# Patient Record
Sex: MALE | Race: BLACK OR AFRICAN AMERICAN | NOT HISPANIC OR LATINO | Employment: UNEMPLOYED | URBAN - NONMETROPOLITAN AREA
[De-identification: names, ages, dates, MRNs, and addresses within clinical notes are randomized per-mention and may not be internally consistent; named-entity substitution may affect disease eponyms.]

---

## 2021-01-23 ENCOUNTER — HOSPITAL ENCOUNTER (EMERGENCY)
Facility: HOSPITAL | Age: 56
Discharge: HOME | End: 2021-01-23
Attending: EMERGENCY MEDICINE
Payer: MEDICAID

## 2021-01-23 VITALS
WEIGHT: 248 LBS | HEIGHT: 70 IN | TEMPERATURE: 98 F | SYSTOLIC BLOOD PRESSURE: 137 MMHG | BODY MASS INDEX: 35.5 KG/M2 | OXYGEN SATURATION: 99 % | DIASTOLIC BLOOD PRESSURE: 98 MMHG | HEART RATE: 100 BPM | RESPIRATION RATE: 20 BRPM

## 2021-01-23 DIAGNOSIS — R05.9 COUGH: Primary | ICD-10-CM

## 2021-01-23 DIAGNOSIS — J30.9 ALLERGIC RHINITIS WITH POSTNASAL DRIP: ICD-10-CM

## 2021-01-23 DIAGNOSIS — R09.82 ALLERGIC RHINITIS WITH POSTNASAL DRIP: ICD-10-CM

## 2021-01-23 PROCEDURE — 96372 THER/PROPH/DIAG INJ SC/IM: CPT

## 2021-01-23 PROCEDURE — 99284 EMERGENCY DEPT VISIT MOD MDM: CPT | Mod: 25

## 2021-01-23 PROCEDURE — 63600175 PHARM REV CODE 636 W HCPCS: Performed by: EMERGENCY MEDICINE

## 2021-01-23 RX ORDER — BETAMETHASONE SODIUM PHOSPHATE AND BETAMETHASONE ACETATE 3; 3 MG/ML; MG/ML
6 INJECTION, SUSPENSION INTRA-ARTICULAR; INTRALESIONAL; INTRAMUSCULAR; SOFT TISSUE
Status: COMPLETED | OUTPATIENT
Start: 2021-01-23 | End: 2021-01-23

## 2021-01-23 RX ORDER — GABAPENTIN 300 MG/1
CAPSULE ORAL
COMMUNITY
Start: 2020-10-30 | End: 2023-02-27

## 2021-01-23 RX ORDER — INSULIN DETEMIR 100 [IU]/ML
INJECTION, SOLUTION SUBCUTANEOUS
COMMUNITY
Start: 2020-10-26 | End: 2023-02-27

## 2021-01-23 RX ORDER — PANTOPRAZOLE SODIUM 40 MG/1
40 TABLET, DELAYED RELEASE ORAL DAILY
COMMUNITY
Start: 2020-11-05 | End: 2023-02-27

## 2021-01-23 RX ORDER — METFORMIN HYDROCHLORIDE 500 MG/1
500 TABLET ORAL 2 TIMES DAILY
COMMUNITY
Start: 2020-11-02 | End: 2023-02-27

## 2021-01-23 RX ORDER — BENZONATATE 100 MG/1
100 CAPSULE ORAL 3 TIMES DAILY PRN
Qty: 20 CAPSULE | Refills: 0 | Status: SHIPPED | OUTPATIENT
Start: 2021-01-23 | End: 2021-02-02

## 2021-01-23 RX ORDER — AMITRIPTYLINE HYDROCHLORIDE 75 MG/1
TABLET ORAL
COMMUNITY
Start: 2020-10-26 | End: 2023-02-27

## 2021-01-23 RX ADMIN — BETAMETHASONE SODIUM PHOSPHATE AND BETAMETHASONE ACETATE 6 MG: 3; 3 INJECTION, SUSPENSION INTRA-ARTICULAR; INTRALESIONAL; INTRAMUSCULAR; SOFT TISSUE at 11:01

## 2021-02-11 ENCOUNTER — HOSPITAL ENCOUNTER (OUTPATIENT)
Dept: RADIOLOGY | Facility: HOSPITAL | Age: 56
Discharge: HOME | End: 2021-02-11
Attending: NURSE PRACTITIONER
Payer: MEDICAID

## 2021-02-11 DIAGNOSIS — R05.9 COUGH: ICD-10-CM

## 2021-02-11 DIAGNOSIS — R05.9 COUGH: Primary | ICD-10-CM

## 2021-02-11 PROCEDURE — 71046 X-RAY EXAM CHEST 2 VIEWS: CPT | Mod: TC

## 2021-02-12 DIAGNOSIS — Z01.818 PRE-OP TESTING: ICD-10-CM

## 2021-02-12 DIAGNOSIS — F17.200 TOBACCO USE DISORDER: Primary | ICD-10-CM

## 2021-02-12 DIAGNOSIS — Z11.59 SPECIAL SCREENING EXAMINATION FOR VIRAL DISEASE: Primary | ICD-10-CM

## 2021-02-17 ENCOUNTER — HOSPITAL ENCOUNTER (OUTPATIENT)
Dept: PREADMISSION TESTING | Facility: HOSPITAL | Age: 56
Discharge: HOME | End: 2021-02-17
Attending: NURSE PRACTITIONER
Payer: MEDICAID

## 2021-02-17 DIAGNOSIS — Z01.818 PRE-OP TESTING: ICD-10-CM

## 2021-02-17 LAB — SARS-COV-2 RDRP RESP QL NAA+PROBE: NEGATIVE

## 2021-02-17 PROCEDURE — U0002 COVID-19 LAB TEST NON-CDC: HCPCS

## 2021-02-18 ENCOUNTER — HOSPITAL ENCOUNTER (OUTPATIENT)
Dept: PULMONOLOGY | Facility: HOSPITAL | Age: 56
Discharge: HOME | End: 2021-02-18
Attending: NURSE PRACTITIONER
Payer: MEDICAID

## 2021-02-18 DIAGNOSIS — F17.200 TOBACCO USE DISORDER: ICD-10-CM

## 2021-02-18 PROCEDURE — 94060 EVALUATION OF WHEEZING: CPT

## 2021-02-18 RX ORDER — ALBUTEROL SULFATE 2.5 MG/.5ML
2.5 SOLUTION RESPIRATORY (INHALATION) ONCE
Status: COMPLETED | OUTPATIENT
Start: 2021-02-18 | End: 2021-02-18

## 2021-02-18 RX ADMIN — ALBUTEROL SULFATE 2.5 MG: 2.5 SOLUTION RESPIRATORY (INHALATION) at 10:02

## 2021-03-02 LAB
FEF 25 75 LLN: 1.21
FEF 25 75 PRE REF: 170.3 %
FEF 25 75 REF: 2.76
FET100 CHG: -11.1 %
FEV05 LLN: 1.74
FEV05 REF: 2.87
FEV1 CHG: 1 %
FEV1 FVC LLN: 67
FEV1 FVC PRE REF: 117.7 %
FEV1 FVC REF: 79
FEV1 LLN: 2.32
FEV1 PRE REF: 90.6 %
FEV1 REF: 3.16
FEV1 VOL CHG: 0.03
FVC CHG: 2.2 %
FVC LLN: 3.02
FVC PRE REF: 76.9 %
FVC REF: 4.03
FVC VOL CHG: 0.07
PEF LLN: 5.9
PEF PRE REF: 99.6 %
PEF REF: 8.56
PHYSICIAN COMMENT: ABNORMAL
POST FEF 25 75: 4.15 L/S (ref 1.21–4.32)
POST FET 100: 5.98 SEC
POST FEV1 FVC: 91.43 % (ref 67.31–89.81)
POST FEV1: 2.89 L (ref 2.32–4)
POST FEV5: 2.25 L (ref 1.74–4.01)
POST FVC: 3.16 L (ref 3.02–5.04)
POST PEF: 7.61 L/S (ref 5.9–11.22)
PRE FEF 25 75: 4.71 L/S (ref 1.21–4.32)
PRE FET 100: 6.72 SEC
PRE FEV05 REF: 85.1 %
PRE FEV1 FVC: 92.47 % (ref 67.31–89.81)
PRE FEV1: 2.86 L (ref 2.32–4)
PRE FEV5: 2.44 L (ref 1.74–4.01)
PRE FVC: 3.1 L (ref 3.02–5.04)
PRE PEF: 8.53 L/S (ref 5.9–11.22)

## 2021-05-04 DIAGNOSIS — N63.20 BREAST MASS, LEFT: Primary | ICD-10-CM

## 2021-05-05 ENCOUNTER — HOSPITAL ENCOUNTER (OUTPATIENT)
Dept: RADIOLOGY | Facility: HOSPITAL | Age: 56
Discharge: HOME | End: 2021-05-05
Attending: NURSE PRACTITIONER
Payer: MEDICAID

## 2021-05-05 VITALS — BODY MASS INDEX: 30.8 KG/M2 | HEIGHT: 71 IN | WEIGHT: 220 LBS

## 2021-05-05 DIAGNOSIS — N63.20 BREAST MASS, LEFT: ICD-10-CM

## 2021-05-05 PROCEDURE — 77066 DX MAMMO INCL CAD BI: CPT | Mod: TC

## 2021-05-06 ENCOUNTER — PATIENT MESSAGE (OUTPATIENT)
Dept: RESEARCH | Facility: HOSPITAL | Age: 56
End: 2021-05-06

## 2021-06-26 ENCOUNTER — CLINICAL SUPPORT (OUTPATIENT)
Dept: OTHER | Facility: CLINIC | Age: 56
End: 2021-06-26

## 2021-06-26 DIAGNOSIS — Z00.8 ENCOUNTER FOR OTHER GENERAL EXAMINATION: ICD-10-CM

## 2021-07-06 VITALS — BODY MASS INDEX: 29.89 KG/M2 | HEIGHT: 71 IN

## 2021-07-06 LAB
HDLC SERPL-MCNC: 35 MG/DL
POC CHOLESTEROL, LDL (DOCK): 104 MG/DL
POC CHOLESTEROL, TOTAL: 172 MG/DL
POC GLUCOSE, FASTING: 139 MG/DL (ref 60–110)
TRIGL SERPL-MCNC: 169 MG/DL

## 2021-12-02 ENCOUNTER — HOSPITAL ENCOUNTER (EMERGENCY)
Facility: HOSPITAL | Age: 56
Discharge: HOME | End: 2021-12-02
Attending: FAMILY MEDICINE
Payer: MEDICAID

## 2021-12-02 VITALS
OXYGEN SATURATION: 100 % | DIASTOLIC BLOOD PRESSURE: 85 MMHG | SYSTOLIC BLOOD PRESSURE: 157 MMHG | WEIGHT: 237 LBS | HEART RATE: 97 BPM | BODY MASS INDEX: 33.05 KG/M2 | RESPIRATION RATE: 18 BRPM | TEMPERATURE: 98 F

## 2021-12-02 DIAGNOSIS — R05.9 COUGH: ICD-10-CM

## 2021-12-02 LAB
ALBUMIN SERPL BCP-MCNC: 3.5 G/DL (ref 3.5–5.2)
ALP SERPL-CCNC: 102 U/L (ref 55–135)
ALT SERPL W/O P-5'-P-CCNC: 27 U/L (ref 10–44)
ANION GAP SERPL CALC-SCNC: 6 MMOL/L (ref 8–16)
AST SERPL-CCNC: 15 U/L (ref 10–40)
BASOPHILS # BLD AUTO: 0.03 K/UL (ref 0–0.2)
BASOPHILS NFR BLD: 0.4 % (ref 0–1.9)
BILIRUB SERPL-MCNC: 0.4 MG/DL (ref 0.1–1)
BUN SERPL-MCNC: 12 MG/DL (ref 6–20)
CALCIUM SERPL-MCNC: 8.8 MG/DL (ref 8.7–10.5)
CHLORIDE SERPL-SCNC: 105 MMOL/L (ref 95–110)
CO2 SERPL-SCNC: 30 MMOL/L (ref 23–29)
CREAT SERPL-MCNC: 1.1 MG/DL (ref 0.5–1.4)
CTP QC/QA: YES
DIFFERENTIAL METHOD: ABNORMAL
EOSINOPHIL # BLD AUTO: 0.2 K/UL (ref 0–0.5)
EOSINOPHIL NFR BLD: 2.8 % (ref 0–8)
ERYTHROCYTE [DISTWIDTH] IN BLOOD BY AUTOMATED COUNT: 14.1 % (ref 11.5–14.5)
EST. GFR  (AFRICAN AMERICAN): >60 ML/MIN/1.73 M^2
EST. GFR  (NON AFRICAN AMERICAN): >60 ML/MIN/1.73 M^2
GLUCOSE SERPL-MCNC: 197 MG/DL (ref 70–110)
HCT VFR BLD AUTO: 41.4 % (ref 40–54)
HGB BLD-MCNC: 12.5 G/DL (ref 14–18)
IMM GRANULOCYTES # BLD AUTO: 0.03 K/UL (ref 0–0.04)
IMM GRANULOCYTES NFR BLD AUTO: 0.4 % (ref 0–0.5)
LYMPHOCYTES # BLD AUTO: 1.5 K/UL (ref 1–4.8)
LYMPHOCYTES NFR BLD: 22.6 % (ref 18–48)
MCH RBC QN AUTO: 27.6 PG (ref 27–31)
MCHC RBC AUTO-ENTMCNC: 30.2 G/DL (ref 32–36)
MCV RBC AUTO: 91 FL (ref 82–98)
MONOCYTES # BLD AUTO: 0.6 K/UL (ref 0.3–1)
MONOCYTES NFR BLD: 8.3 % (ref 4–15)
NEUTROPHILS # BLD AUTO: 4.4 K/UL (ref 1.8–7.7)
NEUTROPHILS NFR BLD: 65.5 % (ref 38–73)
NRBC BLD-RTO: 0 /100 WBC
NT-PROBNP SERPL-MCNC: <5 PG/ML (ref 5–900)
PLATELET # BLD AUTO: 174 K/UL (ref 150–450)
PMV BLD AUTO: 10.6 FL (ref 9.2–12.9)
POTASSIUM SERPL-SCNC: 4.1 MMOL/L (ref 3.5–5.1)
PROT SERPL-MCNC: 7.4 G/DL (ref 6–8.4)
RBC # BLD AUTO: 4.53 M/UL (ref 4.6–6.2)
SARS-COV-2 RDRP RESP QL NAA+PROBE: NEGATIVE
SODIUM SERPL-SCNC: 141 MMOL/L (ref 136–145)
TROPONIN I SERPL DL<=0.01 NG/ML-MCNC: <0.02 NG/ML (ref 0–0.03)
WBC # BLD AUTO: 6.72 K/UL (ref 3.9–12.7)

## 2021-12-02 PROCEDURE — 99284 EMERGENCY DEPT VISIT MOD MDM: CPT | Mod: 25

## 2021-12-02 PROCEDURE — U0002 COVID-19 LAB TEST NON-CDC: HCPCS | Performed by: CLINICAL NURSE SPECIALIST

## 2021-12-02 PROCEDURE — 36415 COLL VENOUS BLD VENIPUNCTURE: CPT | Performed by: CLINICAL NURSE SPECIALIST

## 2021-12-02 PROCEDURE — 80053 COMPREHEN METABOLIC PANEL: CPT | Performed by: CLINICAL NURSE SPECIALIST

## 2021-12-02 PROCEDURE — 85025 COMPLETE CBC W/AUTO DIFF WBC: CPT | Performed by: CLINICAL NURSE SPECIALIST

## 2021-12-02 PROCEDURE — 84484 ASSAY OF TROPONIN QUANT: CPT | Performed by: CLINICAL NURSE SPECIALIST

## 2021-12-02 PROCEDURE — 83880 ASSAY OF NATRIURETIC PEPTIDE: CPT | Performed by: CLINICAL NURSE SPECIALIST

## 2021-12-02 RX ORDER — BENZONATATE 100 MG/1
100 CAPSULE ORAL 3 TIMES DAILY PRN
Qty: 20 CAPSULE | Refills: 0 | Status: SHIPPED | OUTPATIENT
Start: 2021-12-02 | End: 2021-12-12

## 2021-12-02 RX ORDER — ALBUTEROL SULFATE 90 UG/1
1-2 AEROSOL, METERED RESPIRATORY (INHALATION) EVERY 6 HOURS PRN
Qty: 18 G | Refills: 0 | Status: SHIPPED | OUTPATIENT
Start: 2021-12-02 | End: 2023-03-28

## 2022-01-06 DIAGNOSIS — F17.200 TOBACCO USE DISORDER: Primary | ICD-10-CM

## 2022-01-13 ENCOUNTER — HOSPITAL ENCOUNTER (OUTPATIENT)
Dept: RADIOLOGY | Facility: HOSPITAL | Age: 57
Discharge: HOME | End: 2022-01-13
Attending: NURSE PRACTITIONER
Payer: MEDICAID

## 2022-01-13 DIAGNOSIS — F17.200 TOBACCO USE DISORDER: ICD-10-CM

## 2022-01-13 PROCEDURE — 71271 CT THORAX LUNG CANCER SCR C-: CPT | Mod: TC

## 2022-02-22 ENCOUNTER — OFFICE VISIT (OUTPATIENT)
Dept: ENDOCRINOLOGY | Facility: CLINIC | Age: 57
End: 2022-02-22
Payer: MEDICAID

## 2022-02-22 DIAGNOSIS — N62 GYNECOMASTIA: Primary | ICD-10-CM

## 2022-02-22 PROCEDURE — 1159F MED LIST DOCD IN RCRD: CPT | Mod: CPTII,,, | Performed by: STUDENT IN AN ORGANIZED HEALTH CARE EDUCATION/TRAINING PROGRAM

## 2022-02-22 PROCEDURE — 1160F PR REVIEW ALL MEDS BY PRESCRIBER/CLIN PHARMACIST DOCUMENTED: ICD-10-PCS | Mod: CPTII,,, | Performed by: STUDENT IN AN ORGANIZED HEALTH CARE EDUCATION/TRAINING PROGRAM

## 2022-02-22 PROCEDURE — 99499 UNLISTED E&M SERVICE: CPT | Mod: S$PBB,,, | Performed by: STUDENT IN AN ORGANIZED HEALTH CARE EDUCATION/TRAINING PROGRAM

## 2022-02-22 PROCEDURE — 99499 NO LOS: ICD-10-PCS | Mod: S$PBB,,, | Performed by: STUDENT IN AN ORGANIZED HEALTH CARE EDUCATION/TRAINING PROGRAM

## 2022-02-22 PROCEDURE — 4010F ACE/ARB THERAPY RXD/TAKEN: CPT | Mod: CPTII,,, | Performed by: STUDENT IN AN ORGANIZED HEALTH CARE EDUCATION/TRAINING PROGRAM

## 2022-02-22 PROCEDURE — 4010F PR ACE/ARB THEARPY RXD/TAKEN: ICD-10-PCS | Mod: CPTII,,, | Performed by: STUDENT IN AN ORGANIZED HEALTH CARE EDUCATION/TRAINING PROGRAM

## 2022-02-22 PROCEDURE — 1159F PR MEDICATION LIST DOCUMENTED IN MEDICAL RECORD: ICD-10-PCS | Mod: CPTII,,, | Performed by: STUDENT IN AN ORGANIZED HEALTH CARE EDUCATION/TRAINING PROGRAM

## 2022-02-22 PROCEDURE — 1160F RVW MEDS BY RX/DR IN RCRD: CPT | Mod: CPTII,,, | Performed by: STUDENT IN AN ORGANIZED HEALTH CARE EDUCATION/TRAINING PROGRAM

## 2022-03-09 DIAGNOSIS — M25.562 ACUTE PAIN OF BOTH KNEES: Primary | ICD-10-CM

## 2022-03-09 DIAGNOSIS — M25.561 ACUTE PAIN OF BOTH KNEES: Primary | ICD-10-CM

## 2022-03-10 ENCOUNTER — OFFICE VISIT (OUTPATIENT)
Dept: SURGERY | Facility: CLINIC | Age: 57
End: 2022-03-10
Payer: MEDICAID

## 2022-03-10 ENCOUNTER — OFFICE VISIT (OUTPATIENT)
Dept: ENDOCRINOLOGY | Facility: CLINIC | Age: 57
End: 2022-03-10
Payer: MEDICAID

## 2022-03-10 VITALS
WEIGHT: 237.44 LBS | DIASTOLIC BLOOD PRESSURE: 70 MMHG | WEIGHT: 237.44 LBS | BODY MASS INDEX: 33.24 KG/M2 | SYSTOLIC BLOOD PRESSURE: 118 MMHG | SYSTOLIC BLOOD PRESSURE: 159 MMHG | HEIGHT: 71 IN | HEART RATE: 67 BPM | DIASTOLIC BLOOD PRESSURE: 87 MMHG | OXYGEN SATURATION: 97 % | HEIGHT: 71 IN | BODY MASS INDEX: 33.24 KG/M2

## 2022-03-10 DIAGNOSIS — Z01.818 PRE-OP TESTING: Primary | ICD-10-CM

## 2022-03-10 DIAGNOSIS — Z79.4 TYPE 2 DIABETES MELLITUS WITHOUT COMPLICATION, WITH LONG-TERM CURRENT USE OF INSULIN: ICD-10-CM

## 2022-03-10 DIAGNOSIS — E11.9 TYPE 2 DIABETES MELLITUS WITHOUT COMPLICATION, WITH LONG-TERM CURRENT USE OF INSULIN: ICD-10-CM

## 2022-03-10 DIAGNOSIS — Z12.11 SCREEN FOR COLON CANCER: ICD-10-CM

## 2022-03-10 DIAGNOSIS — N62 GYNECOMASTIA: Primary | ICD-10-CM

## 2022-03-10 PROCEDURE — 3008F PR BODY MASS INDEX (BMI) DOCUMENTED: ICD-10-PCS | Mod: CPTII,,, | Performed by: STUDENT IN AN ORGANIZED HEALTH CARE EDUCATION/TRAINING PROGRAM

## 2022-03-10 PROCEDURE — 3079F DIAST BP 80-89 MM HG: CPT | Mod: CPTII,,, | Performed by: STUDENT IN AN ORGANIZED HEALTH CARE EDUCATION/TRAINING PROGRAM

## 2022-03-10 PROCEDURE — 3044F HG A1C LEVEL LT 7.0%: CPT | Mod: CPTII,,, | Performed by: STUDENT IN AN ORGANIZED HEALTH CARE EDUCATION/TRAINING PROGRAM

## 2022-03-10 PROCEDURE — 4010F PR ACE/ARB THEARPY RXD/TAKEN: ICD-10-PCS | Mod: CPTII,,, | Performed by: STUDENT IN AN ORGANIZED HEALTH CARE EDUCATION/TRAINING PROGRAM

## 2022-03-10 PROCEDURE — 4010F ACE/ARB THERAPY RXD/TAKEN: CPT | Mod: CPTII,,, | Performed by: STUDENT IN AN ORGANIZED HEALTH CARE EDUCATION/TRAINING PROGRAM

## 2022-03-10 PROCEDURE — 99999 PR PBB SHADOW E&M-EST. PATIENT-LVL III: CPT | Mod: PBBFAC,,, | Performed by: STUDENT IN AN ORGANIZED HEALTH CARE EDUCATION/TRAINING PROGRAM

## 2022-03-10 PROCEDURE — 99999 PR PBB SHADOW E&M-EST. PATIENT-LVL III: ICD-10-PCS | Mod: PBBFAC,,, | Performed by: STUDENT IN AN ORGANIZED HEALTH CARE EDUCATION/TRAINING PROGRAM

## 2022-03-10 PROCEDURE — 3077F SYST BP >= 140 MM HG: CPT | Mod: CPTII,,, | Performed by: STUDENT IN AN ORGANIZED HEALTH CARE EDUCATION/TRAINING PROGRAM

## 2022-03-10 PROCEDURE — 3079F PR MOST RECENT DIASTOLIC BLOOD PRESSURE 80-89 MM HG: ICD-10-PCS | Mod: CPTII,,, | Performed by: STUDENT IN AN ORGANIZED HEALTH CARE EDUCATION/TRAINING PROGRAM

## 2022-03-10 PROCEDURE — 1159F PR MEDICATION LIST DOCUMENTED IN MEDICAL RECORD: ICD-10-PCS | Mod: CPTII,,, | Performed by: STUDENT IN AN ORGANIZED HEALTH CARE EDUCATION/TRAINING PROGRAM

## 2022-03-10 PROCEDURE — 99214 PR OFFICE/OUTPT VISIT, EST, LEVL IV, 30-39 MIN: ICD-10-PCS | Mod: S$PBB,,, | Performed by: STUDENT IN AN ORGANIZED HEALTH CARE EDUCATION/TRAINING PROGRAM

## 2022-03-10 PROCEDURE — 3077F PR MOST RECENT SYSTOLIC BLOOD PRESSURE >= 140 MM HG: ICD-10-PCS | Mod: CPTII,,, | Performed by: STUDENT IN AN ORGANIZED HEALTH CARE EDUCATION/TRAINING PROGRAM

## 2022-03-10 PROCEDURE — 3044F PR MOST RECENT HEMOGLOBIN A1C LEVEL <7.0%: ICD-10-PCS | Mod: CPTII,,, | Performed by: STUDENT IN AN ORGANIZED HEALTH CARE EDUCATION/TRAINING PROGRAM

## 2022-03-10 PROCEDURE — 99999 PR PBB SHADOW E&M-EST. PATIENT-LVL V: CPT | Mod: PBBFAC,,, | Performed by: STUDENT IN AN ORGANIZED HEALTH CARE EDUCATION/TRAINING PROGRAM

## 2022-03-10 PROCEDURE — 99203 OFFICE O/P NEW LOW 30 MIN: CPT | Mod: S$PBB,,, | Performed by: STUDENT IN AN ORGANIZED HEALTH CARE EDUCATION/TRAINING PROGRAM

## 2022-03-10 PROCEDURE — 3008F BODY MASS INDEX DOCD: CPT | Mod: CPTII,,, | Performed by: STUDENT IN AN ORGANIZED HEALTH CARE EDUCATION/TRAINING PROGRAM

## 2022-03-10 PROCEDURE — 99215 OFFICE O/P EST HI 40 MIN: CPT | Mod: PBBFAC,27 | Performed by: STUDENT IN AN ORGANIZED HEALTH CARE EDUCATION/TRAINING PROGRAM

## 2022-03-10 PROCEDURE — 99213 OFFICE O/P EST LOW 20 MIN: CPT | Mod: PBBFAC,PN,27 | Performed by: STUDENT IN AN ORGANIZED HEALTH CARE EDUCATION/TRAINING PROGRAM

## 2022-03-10 PROCEDURE — 99999 PR PBB SHADOW E&M-EST. PATIENT-LVL V: ICD-10-PCS | Mod: PBBFAC,,, | Performed by: STUDENT IN AN ORGANIZED HEALTH CARE EDUCATION/TRAINING PROGRAM

## 2022-03-10 PROCEDURE — 99214 OFFICE O/P EST MOD 30 MIN: CPT | Mod: S$PBB,,, | Performed by: STUDENT IN AN ORGANIZED HEALTH CARE EDUCATION/TRAINING PROGRAM

## 2022-03-10 PROCEDURE — 3078F PR MOST RECENT DIASTOLIC BLOOD PRESSURE < 80 MM HG: ICD-10-PCS | Mod: CPTII,,, | Performed by: STUDENT IN AN ORGANIZED HEALTH CARE EDUCATION/TRAINING PROGRAM

## 2022-03-10 PROCEDURE — 1159F MED LIST DOCD IN RCRD: CPT | Mod: CPTII,,, | Performed by: STUDENT IN AN ORGANIZED HEALTH CARE EDUCATION/TRAINING PROGRAM

## 2022-03-10 PROCEDURE — 3078F DIAST BP <80 MM HG: CPT | Mod: CPTII,,, | Performed by: STUDENT IN AN ORGANIZED HEALTH CARE EDUCATION/TRAINING PROGRAM

## 2022-03-10 PROCEDURE — 3074F PR MOST RECENT SYSTOLIC BLOOD PRESSURE < 130 MM HG: ICD-10-PCS | Mod: CPTII,,, | Performed by: STUDENT IN AN ORGANIZED HEALTH CARE EDUCATION/TRAINING PROGRAM

## 2022-03-10 PROCEDURE — 99203 PR OFFICE/OUTPT VISIT, NEW, LEVL III, 30-44 MIN: ICD-10-PCS | Mod: S$PBB,,, | Performed by: STUDENT IN AN ORGANIZED HEALTH CARE EDUCATION/TRAINING PROGRAM

## 2022-03-10 PROCEDURE — 3074F SYST BP LT 130 MM HG: CPT | Mod: CPTII,,, | Performed by: STUDENT IN AN ORGANIZED HEALTH CARE EDUCATION/TRAINING PROGRAM

## 2022-03-10 RX ORDER — POLYETHYLENE GLYCOL 3350, SODIUM SULFATE ANHYDROUS, SODIUM BICARBONATE, SODIUM CHLORIDE, POTASSIUM CHLORIDE 236; 22.74; 6.74; 5.86; 2.97 G/4L; G/4L; G/4L; G/4L; G/4L
4 POWDER, FOR SOLUTION ORAL ONCE
Qty: 4000 ML | Refills: 0 | Status: SHIPPED | OUTPATIENT
Start: 2022-03-10 | End: 2022-03-10

## 2022-03-10 RX ORDER — EMPAGLIFLOZIN 25 MG/1
TABLET, FILM COATED ORAL
COMMUNITY
Start: 2022-02-23 | End: 2023-11-03 | Stop reason: SDUPTHER

## 2022-03-10 NOTE — PATIENT INSTRUCTIONS
You are scheduled for a colonoscopy with Dr. Pollack on 4/13/2022    Take  bowel prep on 4/12/2022 (the day before your procedure).  Start taking Golytely at 6pm the day before procedure.  Follow the instructions provided by the pharmacy.  You may mix with clear, low sugar beverage of your choice;  Prep is better tolerated chilled       Eat a clear liquid diet on 4/12/2022.  Avoid meats, beans, corn, okra, seed-containing fruits, and red liquids    The following foods are generally allowed in a clear liquid diet:    Water (plain, carbonated or flavored)  Fruit juices without pulp, such as apple or white grape juice  Fruit-flavored beverages, such as fruit punch or lemonade  Carbonated drinks, including dark sodas (cola and root beer)  Gelatin  Tea or coffee without milk or cream  Strained tomato or vegetable juice  Sports drinks  Clear, fat-free broth (bouillon or consomme)  Honey or sugar  Hard candy, such as lemon drops or peppermint rounds  Ice pops without milk, bits of fruit, seeds or nuts    Nothing to eat or drink after midnight on 4/13/2022 except for sips of water with medications    Please have someone drive you to and from your procedure    Hold Plavix 7 days prior to your procedure

## 2022-03-10 NOTE — ASSESSMENT & PLAN NOTE
A1c and POCT glucose at goal  Continue same regimen but if hypoglycemia recurs may need less Levemir    Plan  - Continue Levemir 20 U daily  - Continue Metformin 1,000 mg BID  - Continue Jardiance 25 mg daily    A1c, BMP, now    F/u 3 months

## 2022-03-10 NOTE — PROGRESS NOTES
"Subjective:      Patient ID: Jayden Canada Jr. is a 57 y.o. male.    Chief Complaint:  Gynecomastia    History of Present Illness  This is a 57 y.o. male. with a past medical history of T2DM, HTN, HLD, PVD here for evaluation of gynecomastia.    With regards to gynecomastia  Gynecomastia noted about 1 year ago  L side only  Sometimes tender/hard    Mammogram (May/2021)  BI-RADS Category:   Overall: 2 - Benign Findings. Pattern suggests gynecomastia of the left breast. Recommend clinical follow-up.     With regards to type 2 diabetes mellitus  Current diabetes medications:  - Levemir 20 U AM  - Metformin 1,000 mg BID  - Jaridance 25 mg daily    Past diabetes medications:  - None       Known diabetic complications: none    Weight trend:  Wt Readings from Last 5 Encounters:   03/10/22 107.7 kg (237 lb 7 oz)   12/02/21 107.5 kg (237 lb)   06/24/21 97.2 kg (214 lb 4.6 oz)   05/05/21 99.8 kg (220 lb)   01/23/21 112.5 kg (248 lb)       Blood glucose monitoring at home: 2x/daily  mostly  Any episodes of hypoglycemia? 2 weeks ago had a 40    Diabetes Management Status  Statin: Not taking  ACE/ARB: Taking    Outside labs  12/21/21  HbA1c 6.4%  Hb 12.3, Hct 40  PSA 0.6  Vit D 24  UACR < 3 mg/g  TSH 2.3, FT4 1.03  , Tg 211, HDL 29, LDL 84  BUN 14, Cr 1.1     Review of Systems  As above    Social and family history reviewed  Current medications and allergies reviewed    Objective:   /70 (BP Location: Right arm, Patient Position: Sitting)   Ht 5' 11" (1.803 m)   Wt 107.7 kg (237 lb 7 oz)   BMI 33.12 kg/m²   Physical Exam  Alert, oriented  Nontender gynecomastia on L side only    BP Readings from Last 1 Encounters:   03/10/22 118/70      Wt Readings from Last 1 Encounters:   03/10/22 1113 107.7 kg (237 lb 7 oz)     Body mass index is 33.12 kg/m².    Lab Review:   No results found for: HGBA1C  No results found for: CHOL, HDL, LDLCALC, TRIG, CHOLHDL  Lab Results   Component Value Date     12/02/2021 "    K 4.1 12/02/2021     12/02/2021    CO2 30 (H) 12/02/2021     (H) 12/02/2021    BUN 12 12/02/2021    CREATININE 1.1 12/02/2021    CALCIUM 8.8 12/02/2021    PROT 7.4 12/02/2021    ALBUMIN 3.5 12/02/2021    BILITOT 0.4 12/02/2021    ALKPHOS 102 12/02/2021    AST 15 12/02/2021    ALT 27 12/02/2021    ANIONGAP 6 (L) 12/02/2021    ESTGFRAFRICA >60.0 12/02/2021    EGFRNONAA >60.0 12/02/2021    TSH 0.960 01/09/2017       All pertinent labs reviewed    Assessment and Plan     Gynecomastia  Breast tissue is benign on mammogram and benign appearing  Check PRL,TSH, testosterone panel, BHCG  If hormonal workup negative and gynecomastia bothersome can discuss removal with PCP/surgery        Type 2 diabetes mellitus without complication, with long-term current use of insulin  A1c and POCT glucose at goal  Continue same regimen but if hypoglycemia recurs may need less Levemir    Plan  - Continue Levemir 20 U daily  - Continue Metformin 1,000 mg BID  - Continue Jardiance 25 mg daily    A1c, BMP, now    F/u 3 months    BMI 33.0-33.9,adult  Can discuss GLP-1 RA in the future       Follow-up in 3 months    Geraldo Cortez MD  Endocrinology

## 2022-03-10 NOTE — ASSESSMENT & PLAN NOTE
Breast tissue is benign on mammogram and benign appearing  Check PRL,TSH, testosterone panel, BHCG  If hormonal workup negative and gynecomastia bothersome can discuss removal with PCP/surgery

## 2022-03-11 ENCOUNTER — LAB VISIT (OUTPATIENT)
Dept: LAB | Facility: HOSPITAL | Age: 57
End: 2022-03-11
Attending: STUDENT IN AN ORGANIZED HEALTH CARE EDUCATION/TRAINING PROGRAM
Payer: MEDICAID

## 2022-03-11 DIAGNOSIS — N62 GYNECOMASTIA: ICD-10-CM

## 2022-03-11 DIAGNOSIS — E11.9 TYPE 2 DIABETES MELLITUS WITHOUT COMPLICATION, WITH LONG-TERM CURRENT USE OF INSULIN: ICD-10-CM

## 2022-03-11 DIAGNOSIS — Z79.4 TYPE 2 DIABETES MELLITUS WITHOUT COMPLICATION, WITH LONG-TERM CURRENT USE OF INSULIN: ICD-10-CM

## 2022-03-11 LAB
ANION GAP SERPL CALC-SCNC: 4 MMOL/L (ref 8–16)
BUN SERPL-MCNC: 12 MG/DL (ref 6–20)
CALCIUM SERPL-MCNC: 8.4 MG/DL (ref 8.7–10.5)
CHLORIDE SERPL-SCNC: 110 MMOL/L (ref 95–110)
CO2 SERPL-SCNC: 28 MMOL/L (ref 23–29)
CREAT SERPL-MCNC: 0.9 MG/DL (ref 0.5–1.4)
EST. GFR  (AFRICAN AMERICAN): >60 ML/MIN/1.73 M^2
EST. GFR  (NON AFRICAN AMERICAN): >60 ML/MIN/1.73 M^2
ESTIMATED AVG GLUCOSE: 123 MG/DL (ref 68–131)
GLUCOSE SERPL-MCNC: 97 MG/DL (ref 70–110)
HBA1C MFR BLD: 5.9 % (ref 4–5.6)
POTASSIUM SERPL-SCNC: 4.1 MMOL/L (ref 3.5–5.1)
PROLACTIN SERPL IA-MCNC: 6.7 NG/ML (ref 3.5–19.4)
SODIUM SERPL-SCNC: 142 MMOL/L (ref 136–145)
TSH SERPL DL<=0.005 MIU/L-ACNC: 1.25 UIU/ML (ref 0.4–4)

## 2022-03-11 PROCEDURE — 84702 CHORIONIC GONADOTROPIN TEST: CPT | Performed by: STUDENT IN AN ORGANIZED HEALTH CARE EDUCATION/TRAINING PROGRAM

## 2022-03-11 PROCEDURE — 84146 ASSAY OF PROLACTIN: CPT | Performed by: STUDENT IN AN ORGANIZED HEALTH CARE EDUCATION/TRAINING PROGRAM

## 2022-03-11 PROCEDURE — 83036 HEMOGLOBIN GLYCOSYLATED A1C: CPT | Performed by: STUDENT IN AN ORGANIZED HEALTH CARE EDUCATION/TRAINING PROGRAM

## 2022-03-11 PROCEDURE — 80048 BASIC METABOLIC PNL TOTAL CA: CPT | Performed by: STUDENT IN AN ORGANIZED HEALTH CARE EDUCATION/TRAINING PROGRAM

## 2022-03-11 PROCEDURE — 36415 COLL VENOUS BLD VENIPUNCTURE: CPT | Performed by: STUDENT IN AN ORGANIZED HEALTH CARE EDUCATION/TRAINING PROGRAM

## 2022-03-11 PROCEDURE — 84443 ASSAY THYROID STIM HORMONE: CPT | Performed by: STUDENT IN AN ORGANIZED HEALTH CARE EDUCATION/TRAINING PROGRAM

## 2022-03-11 PROCEDURE — 82040 ASSAY OF SERUM ALBUMIN: CPT | Performed by: STUDENT IN AN ORGANIZED HEALTH CARE EDUCATION/TRAINING PROGRAM

## 2022-03-15 LAB — B-HCG SERPL-ACNC: <0.6 IU/L

## 2022-03-16 LAB
ALBUMIN SERPL-MCNC: 4.1 G/DL (ref 3.6–5.1)
SHBG SERPL-SCNC: 16 NMOL/L (ref 22–77)
TESTOST FREE SERPL-MCNC: 47 PG/ML (ref 46–224)
TESTOST SERPL-MCNC: 218 NG/DL (ref 250–1100)
TESTOSTERONE.FREE+WB SERPL-MCNC: 88.6 NG/DL (ref 110–575)

## 2022-03-19 NOTE — H&P
"[facility] H&P      Consult: Screening colonoscopy  Consulting Service: TAC     HPI: Pt is a 57 y.o.   male with PMH sig for DMII, PVD, HTN and obesity who presents for routine screening colonoscopy.  No personal or family history of colon cancer.  Has daily regular BMs.  Denies melena, rectal bleeding or pain, change in bowel habits or unintended weight loss.  Denies CP, SOB, abdominal pain, nausea, vomiting, fevers, or chills.        PMH:   Past Medical History:   Diagnosis Date    Acid reflux     Diabetes mellitus     Glaucoma     Hypertension        PSH:   Past Surgical History:   Procedure Laterality Date    LEG SURGERY         Meds: See medication list;  On Plavix     ALL: Review of patient's allergies indicates:  No Known Allergies    FHX: non contributory    SOC: denies ARLENE    ROS: As per HPI    Physical Exam:  BP (!) 159/87 (BP Location: Right arm, Patient Position: Sitting, BP Method: Large (Automatic))   Pulse 67   Ht 5' 11" (1.803 m)   Wt 107.7 kg (237 lb 7 oz)   SpO2 97%   BMI 33.12 kg/m²   GEN: NAD, A/Ox4  HEENT: Anicteric sclera, EOMI  Neck: Supple, no LAD  CV: RRR  Pulm: CTAB; breaths equal and nonlabored  ABD: Soft, NTTP, ND  Ext: no c/c/e      A/P: Pt is a 57 y.o.   male who presents for routine screening colonoscopy  --To [facility] on 4/13/2022 for colonoscopy  --Procedure in detail explained to patient;  including risks including but not limited to bleeding, infection, damage to surrounding structures, and perforation- patient voiced understanding  --CBC, CMP, CXR, EKG, COVID   --Bowel prep given - rey  --CLD day before procedure            Reny Pollack MD  [phone]        "

## 2022-03-30 RX ORDER — SODIUM CHLORIDE 0.9 % (FLUSH) 0.9 %
10 SYRINGE (ML) INJECTION
Status: CANCELLED | OUTPATIENT
Start: 2022-03-31

## 2022-03-30 RX ORDER — SODIUM CHLORIDE 9 MG/ML
INJECTION, SOLUTION INTRAVENOUS CONTINUOUS
Status: CANCELLED | OUTPATIENT
Start: 2022-03-31

## 2022-03-31 ENCOUNTER — TELEPHONE (OUTPATIENT)
Dept: SURGERY | Facility: CLINIC | Age: 57
End: 2022-03-31
Payer: MEDICAID

## 2022-04-05 NOTE — DISCHARGE INSTRUCTIONS
Addended by: YOLANDA JACQUES on: 1/7/2021 02:47 PM     Modules accepted: Orders     BEFORE THE PROCEDURE:    REPORT ANY CHANGE IN YOUR PHYSICAL CONDITION TO YOUR DOCTOR IMMEDIATELY.  SELF ISOLATE AND CHECK TEMPERATURE DAILY, IF TEMP OVER 100, CALL PHYSICIAN IMMEDIATELY.  TRY TO REFRAIN FROM SMOKING AND ALCOHOL 72 HOURS BEFORE YOUR PROCEDURE.   DO NOT EAT OR DRINK ANYTHING AFTER MIDNIGHT THE NIGHT BEFORE YOUR PROCEDURE.  NO MAKE UP, NAIL POLISH OR JEWELRY.      SOMEONE WILL CALL YOU THE DAY BEFORE YOUR PROCEDURE WITH A CHECK-IN TIME FOR YOUR PROCEDURE.    DAY OF YOUR PROCEDURE:    TAKE BLOOD PRESSURE MEDICATIONS THE MORNING OF YOUR PROCEDURE, WITH SMALL SIPS WATER, AS DIRECTED BY YOUR PHYSICIAN.   DO NOT TAKE ANY DIABETIC MEDICATIONS UNLESS DIRECTED TO DO SO BY YOUR PHYSICIAN.   CONTACT LENSES AND DENTURES MUST BE REMOVED.  A RESPONSIBLE ADULT MUST ACCOMPANY YOU HOME UPON DISCHARGE.   ONLY 1 VISITOR ALLOWED PER ROOM.     COVID TESTING Tuesday April 12, 2022 BETWEEN 8-11 A.M.    YOUR THOUGHTS AND OPINIONS HELP US TO BETTER SERVE YOU.     PLEASE PARTICIPATE IN SURVEYS ABOUT YOUR CARE.    THANK YOU FOR CHOOSING [facility].

## 2022-04-06 ENCOUNTER — HOSPITAL ENCOUNTER (OUTPATIENT)
Dept: PREADMISSION TESTING | Facility: HOSPITAL | Age: 57
Discharge: HOME | End: 2022-04-06
Attending: STUDENT IN AN ORGANIZED HEALTH CARE EDUCATION/TRAINING PROGRAM
Payer: MEDICAID

## 2022-04-07 ENCOUNTER — HOSPITAL ENCOUNTER (OUTPATIENT)
Dept: PREADMISSION TESTING | Facility: HOSPITAL | Age: 57
Discharge: HOME | End: 2022-04-07
Attending: STUDENT IN AN ORGANIZED HEALTH CARE EDUCATION/TRAINING PROGRAM
Payer: MEDICAID

## 2022-04-07 ENCOUNTER — HOSPITAL ENCOUNTER (OUTPATIENT)
Dept: RADIOLOGY | Facility: HOSPITAL | Age: 57
Discharge: HOME | End: 2022-04-07
Attending: STUDENT IN AN ORGANIZED HEALTH CARE EDUCATION/TRAINING PROGRAM
Payer: MEDICAID

## 2022-04-07 ENCOUNTER — TELEPHONE (OUTPATIENT)
Dept: SURGERY | Facility: CLINIC | Age: 57
End: 2022-04-07
Payer: MEDICAID

## 2022-04-07 ENCOUNTER — HOSPITAL ENCOUNTER (OUTPATIENT)
Dept: PULMONOLOGY | Facility: HOSPITAL | Age: 57
Discharge: HOME | End: 2022-04-07
Attending: STUDENT IN AN ORGANIZED HEALTH CARE EDUCATION/TRAINING PROGRAM
Payer: MEDICAID

## 2022-04-07 VITALS — WEIGHT: 235 LBS | HEIGHT: 71 IN | BODY MASS INDEX: 32.9 KG/M2

## 2022-04-07 DIAGNOSIS — Z12.11 SCREEN FOR COLON CANCER: ICD-10-CM

## 2022-04-07 DIAGNOSIS — Z01.818 PRE-OP TESTING: ICD-10-CM

## 2022-04-07 PROCEDURE — 93010 EKG 12-LEAD: ICD-10-PCS | Mod: ,,, | Performed by: INTERNAL MEDICINE

## 2022-04-07 PROCEDURE — 71045 X-RAY EXAM CHEST 1 VIEW: CPT | Mod: TC

## 2022-04-07 PROCEDURE — 93005 ELECTROCARDIOGRAM TRACING: CPT

## 2022-04-07 PROCEDURE — 93010 ELECTROCARDIOGRAM REPORT: CPT | Mod: ,,, | Performed by: INTERNAL MEDICINE

## 2022-04-07 RX ORDER — CARVEDILOL 12.5 MG/1
12.5 TABLET ORAL 2 TIMES DAILY
COMMUNITY
Start: 2021-12-07

## 2022-04-07 RX ORDER — BACLOFEN 10 MG/1
10 TABLET ORAL 2 TIMES DAILY PRN
COMMUNITY
Start: 2022-03-28 | End: 2023-11-03

## 2022-04-07 NOTE — DISCHARGE INSTRUCTIONS
BEFORE THE PROCEDURE:    REPORT ANY CHANGE IN YOUR PHYSICAL CONDITION TO YOUR DOCTOR IMMEDIATELY.  SELF ISOLATE AND CHECK TEMPERATURE DAILY, IF TEMP OVER 100, CALL PHYSICIAN IMMEDIATELY.  TRY TO REFRAIN FROM SMOKING AND ALCOHOL 72 HOURS BEFORE YOUR PROCEDURE.   DO NOT EAT OR DRINK ANYTHING AFTER MIDNIGHT THE NIGHT BEFORE YOUR PROCEDURE.  NO MAKE UP, NAIL POLISH OR JEWELRY.      SOMEONE WILL CALL YOU THE DAY BEFORE YOUR PROCEDURE WITH A CHECK-IN TIME FOR YOUR PROCEDURE.    DAY OF YOUR PROCEDURE:    TAKE BLOOD PRESSURE MEDICATIONS THE MORNING OF YOUR PROCEDURE, WITH SMALL SIPS WATER, AS DIRECTED BY YOUR PHYSICIAN.   DO NOT TAKE ANY DIABETIC MEDICATIONS UNLESS DIRECTED TO DO SO BY YOUR PHYSICIAN.   CONTACT LENSES AND DENTURES MUST BE REMOVED.  A RESPONSIBLE ADULT MUST ACCOMPANY YOU HOME UPON DISCHARGE.   ONLY 1 VISITOR ALLOWED PER ROOM.     Covid testin g Tuesday April 12, 2022 between 8-11 a.m.    YOUR THOUGHTS AND OPINIONS HELP US TO BETTER SERVE YOU.     PLEASE PARTICIPATE IN SURVEYS ABOUT YOUR CARE.    THANK YOU FOR CHOOSING [facility].

## 2022-04-07 NOTE — PRE-PROCEDURE INSTRUCTIONS
DR. ZAMORA([facility]) NOTE ON CHART DATED 05/26/2021-PATIENT WAS TO FOLLOW UP WITH DR. HARRIS 09/30/2021-MISSED APPOINTMENT ACCORDING TO CIS. NOTIFIED DR. SHANNON-NEEDS CARDIAC CLEARANCE.

## 2022-05-05 ENCOUNTER — TELEPHONE (OUTPATIENT)
Dept: SURGERY | Facility: CLINIC | Age: 57
End: 2022-05-05
Payer: MEDICAID

## 2022-05-06 ENCOUNTER — TELEPHONE (OUTPATIENT)
Dept: SURGERY | Facility: CLINIC | Age: 57
End: 2022-05-06
Payer: MEDICAID

## 2022-05-10 ENCOUNTER — TELEPHONE (OUTPATIENT)
Dept: SURGERY | Facility: CLINIC | Age: 57
End: 2022-05-10
Payer: MEDICAID

## 2022-05-10 NOTE — TELEPHONE ENCOUNTER
Called patient to schedule appointment for colonoscopy since the patient has gooten his cardiac clearance but patient phone is going to voicemail.

## 2022-09-12 LAB — NONINV COLON CA DNA+OCC BLD SCRN STL QL: NORMAL

## 2023-01-06 ENCOUNTER — HOSPITAL ENCOUNTER (EMERGENCY)
Facility: HOSPITAL | Age: 58
Discharge: HOME | End: 2023-01-06
Attending: STUDENT IN AN ORGANIZED HEALTH CARE EDUCATION/TRAINING PROGRAM
Payer: MEDICAID

## 2023-01-06 VITALS
DIASTOLIC BLOOD PRESSURE: 53 MMHG | BODY MASS INDEX: 31.36 KG/M2 | HEIGHT: 71 IN | TEMPERATURE: 98 F | OXYGEN SATURATION: 98 % | WEIGHT: 224 LBS | SYSTOLIC BLOOD PRESSURE: 99 MMHG | RESPIRATION RATE: 18 BRPM | HEART RATE: 76 BPM

## 2023-01-06 DIAGNOSIS — J10.1 INFLUENZA A: Primary | ICD-10-CM

## 2023-01-06 DIAGNOSIS — N30.90 CYSTITIS: ICD-10-CM

## 2023-01-06 DIAGNOSIS — R05.9 COUGH: ICD-10-CM

## 2023-01-06 LAB
ALBUMIN SERPL BCP-MCNC: 3.3 G/DL (ref 3.5–5.2)
ALP SERPL-CCNC: 98 U/L (ref 55–135)
ALT SERPL W/O P-5'-P-CCNC: 19 U/L (ref 10–44)
ANION GAP SERPL CALC-SCNC: 7 MMOL/L (ref 8–16)
AST SERPL-CCNC: 13 U/L (ref 10–40)
BACTERIA #/AREA URNS HPF: NEGATIVE /HPF
BASOPHILS # BLD AUTO: 0.02 K/UL (ref 0–0.2)
BASOPHILS NFR BLD: 0.2 % (ref 0–1.9)
BILIRUB SERPL-MCNC: 0.5 MG/DL (ref 0.1–1)
BILIRUB UR QL STRIP: NEGATIVE
BUN SERPL-MCNC: 33 MG/DL (ref 6–20)
CALCIUM SERPL-MCNC: 8.3 MG/DL (ref 8.7–10.5)
CHLORIDE SERPL-SCNC: 106 MMOL/L (ref 95–110)
CLARITY UR: ABNORMAL
CO2 SERPL-SCNC: 22 MMOL/L (ref 23–29)
COLOR UR: YELLOW
CREAT SERPL-MCNC: 3.3 MG/DL (ref 0.5–1.4)
CTP QC/QA: YES
CTP QC/QA: YES
DIFFERENTIAL METHOD: ABNORMAL
EOSINOPHIL # BLD AUTO: 0.1 K/UL (ref 0–0.5)
EOSINOPHIL NFR BLD: 1.5 % (ref 0–8)
ERYTHROCYTE [DISTWIDTH] IN BLOOD BY AUTOMATED COUNT: 16.4 % (ref 11.5–14.5)
EST. GFR  (NO RACE VARIABLE): 20.8 ML/MIN/1.73 M^2
GLUCOSE SERPL-MCNC: 88 MG/DL (ref 70–110)
GLUCOSE UR QL STRIP: ABNORMAL
HCT VFR BLD AUTO: 30 % (ref 40–54)
HGB BLD-MCNC: 9.3 G/DL (ref 14–18)
HGB UR QL STRIP: NEGATIVE
HYALINE CASTS #/AREA URNS LPF: 0 /LPF
IMM GRANULOCYTES # BLD AUTO: 0.03 K/UL (ref 0–0.04)
IMM GRANULOCYTES NFR BLD AUTO: 0.3 % (ref 0–0.5)
KETONES UR QL STRIP: ABNORMAL
LEUKOCYTE ESTERASE UR QL STRIP: ABNORMAL
LYMPHOCYTES # BLD AUTO: 1.6 K/UL (ref 1–4.8)
LYMPHOCYTES NFR BLD: 18.3 % (ref 18–48)
MAGNESIUM SERPL-MCNC: 2.2 MG/DL (ref 1.6–2.6)
MCH RBC QN AUTO: 25.5 PG (ref 27–31)
MCHC RBC AUTO-ENTMCNC: 31 G/DL (ref 32–36)
MCV RBC AUTO: 82 FL (ref 82–98)
MICROSCOPIC COMMENT: ABNORMAL
MONOCYTES # BLD AUTO: 1.1 K/UL (ref 0.3–1)
MONOCYTES NFR BLD: 12.5 % (ref 4–15)
NEUTROPHILS # BLD AUTO: 6 K/UL (ref 1.8–7.7)
NEUTROPHILS NFR BLD: 67.2 % (ref 38–73)
NITRITE UR QL STRIP: NEGATIVE
NRBC BLD-RTO: 0 /100 WBC
PH UR STRIP: 5 [PH] (ref 5–8)
PLATELET # BLD AUTO: 367 K/UL (ref 150–450)
PMV BLD AUTO: 10.3 FL (ref 9.2–12.9)
POC MOLECULAR INFLUENZA A AGN: POSITIVE
POC MOLECULAR INFLUENZA B AGN: NEGATIVE
POCT GLUCOSE: 178 MG/DL (ref 70–110)
POTASSIUM SERPL-SCNC: 4.3 MMOL/L (ref 3.5–5.1)
PROT SERPL-MCNC: 8 G/DL (ref 6–8.4)
PROT UR QL STRIP: ABNORMAL
RBC # BLD AUTO: 3.64 M/UL (ref 4.6–6.2)
RBC #/AREA URNS HPF: 5 /HPF (ref 0–4)
SARS-COV-2 RDRP RESP QL NAA+PROBE: NEGATIVE
SODIUM SERPL-SCNC: 135 MMOL/L (ref 136–145)
SP GR UR STRIP: 1.02 (ref 1–1.03)
SQUAMOUS #/AREA URNS HPF: 4 /HPF
URN SPEC COLLECT METH UR: ABNORMAL
UROBILINOGEN UR STRIP-ACNC: 1 EU/DL
WBC # BLD AUTO: 8.96 K/UL (ref 3.9–12.7)
WBC #/AREA URNS HPF: 50 /HPF (ref 0–5)
YEAST URNS QL MICRO: ABNORMAL

## 2023-01-06 PROCEDURE — 87086 URINE CULTURE/COLONY COUNT: CPT

## 2023-01-06 PROCEDURE — 99284 EMERGENCY DEPT VISIT MOD MDM: CPT | Mod: 25

## 2023-01-06 PROCEDURE — 87502 INFLUENZA DNA AMP PROBE: CPT

## 2023-01-06 PROCEDURE — 25000003 PHARM REV CODE 250

## 2023-01-06 PROCEDURE — 87635 SARS-COV-2 COVID-19 AMP PRB: CPT

## 2023-01-06 PROCEDURE — 96365 THER/PROPH/DIAG IV INF INIT: CPT

## 2023-01-06 PROCEDURE — 36415 COLL VENOUS BLD VENIPUNCTURE: CPT

## 2023-01-06 PROCEDURE — 80053 COMPREHEN METABOLIC PANEL: CPT

## 2023-01-06 PROCEDURE — 63600175 PHARM REV CODE 636 W HCPCS

## 2023-01-06 PROCEDURE — 83735 ASSAY OF MAGNESIUM: CPT

## 2023-01-06 PROCEDURE — 85025 COMPLETE CBC W/AUTO DIFF WBC: CPT

## 2023-01-06 PROCEDURE — 82962 GLUCOSE BLOOD TEST: CPT

## 2023-01-06 PROCEDURE — 96361 HYDRATE IV INFUSION ADD-ON: CPT

## 2023-01-06 PROCEDURE — 81000 URINALYSIS NONAUTO W/SCOPE: CPT

## 2023-01-06 RX ORDER — CEPHALEXIN 500 MG/1
500 CAPSULE ORAL 4 TIMES DAILY
Qty: 28 CAPSULE | Refills: 0 | Status: SHIPPED | OUTPATIENT
Start: 2023-01-06 | End: 2023-01-13

## 2023-01-06 RX ADMIN — SODIUM CHLORIDE 1000 ML: 9 INJECTION, SOLUTION INTRAVENOUS at 02:01

## 2023-01-06 RX ADMIN — CEFTRIAXONE SODIUM 1 G: 1 INJECTION, POWDER, FOR SOLUTION INTRAMUSCULAR; INTRAVENOUS at 04:01

## 2023-01-06 NOTE — DISCHARGE INSTRUCTIONS
He tested positive for the flu today.  Take Tylenol and ibuprofen as needed for pain and/or fever.  Drink plenty of fluids.  He also had a urinary tract infection.  Take the antibiotics as prescribed.  Follow up with your primary care provider if her symptoms do not improve.

## 2023-01-06 NOTE — ED PROVIDER NOTES
Encounter Date: 1/6/2023       History     Chief Complaint   Patient presents with    Cough     Cough, body aches, sore throat, nausea x 5 days.      57-year-old male to ED with cough, body aches, sore throat, nausea x5 days.  He also reports generalized weakness.    The history is provided by the patient.   Review of patient's allergies indicates:  No Known Allergies  Past Medical History:   Diagnosis Date    Acid reflux     Diabetes mellitus     Drug abuse     18 YEARS AGO    Glaucoma     Hyperlipidemia     Hypertension     PAD (peripheral artery disease)      Past Surgical History:   Procedure Laterality Date    LEG SURGERY       Family History   Problem Relation Age of Onset    Heart disease Mother 85    Kidney disease Mother     Cancer Father 49    No Known Problems Sister     No Known Problems Daughter     No Known Problems Maternal Aunt     No Known Problems Maternal Uncle     No Known Problems Paternal Aunt     No Known Problems Paternal Uncle     No Known Problems Maternal Grandmother     No Known Problems Maternal Grandfather     No Known Problems Paternal Grandmother     No Known Problems Paternal Grandfather     No Known Problems Brother     No Known Problems Sister     No Known Problems Sister     No Known Problems Sister     No Known Problems Sister     No Known Problems Sister     Breast cancer Neg Hx     Ovarian cancer Neg Hx     BRCA 1/2 Neg Hx      Social History     Tobacco Use    Smoking status: Every Day     Packs/day: 2.00     Years: 40.00     Pack years: 80.00     Types: Cigars, Cigarettes    Smokeless tobacco: Never    Tobacco comments:     pt cutting back    Substance Use Topics    Alcohol use: Not Currently    Drug use: No     Review of Systems   Constitutional:  Positive for fatigue. Negative for fever.   HENT:  Positive for congestion and sore throat.    Eyes: Negative.    Respiratory:  Positive for cough.    Cardiovascular:  Negative for chest pain and palpitations.   Gastrointestinal:   Positive for nausea. Negative for abdominal pain and vomiting.   Endocrine: Negative.    Genitourinary:  Negative for difficulty urinating, dysuria and flank pain.   Musculoskeletal:  Positive for myalgias. Negative for neck pain.   Skin:  Negative for rash and wound.   Allergic/Immunologic: Negative.    Neurological:  Negative for light-headedness and headaches.   Hematological: Negative.    Psychiatric/Behavioral: Negative.       Physical Exam     Initial Vitals [01/06/23 1422]   BP Pulse Resp Temp SpO2   99/64 86 18 99.2 °F (37.3 °C) 97 %      MAP       --         Physical Exam    Nursing note and vitals reviewed.  Constitutional: He appears well-developed and well-nourished.   HENT:   Head: Normocephalic and atraumatic.   Eyes: EOM are normal.   Neck: Neck supple.   Normal range of motion.  Cardiovascular:  Normal rate and regular rhythm.           Pulmonary/Chest: Breath sounds normal. No respiratory distress. He has no wheezes.   Abdominal: Abdomen is soft. There is no abdominal tenderness.   Musculoskeletal:         General: Normal range of motion.      Cervical back: Normal range of motion and neck supple.     Neurological: He is alert and oriented to person, place, and time.   Skin: Skin is warm and dry.   Psychiatric: He has a normal mood and affect. Thought content normal.       ED Course   Procedures  Labs Reviewed   CBC W/ AUTO DIFFERENTIAL - Abnormal; Notable for the following components:       Result Value    RBC 3.64 (*)     Hemoglobin 9.3 (*)     Hematocrit 30.0 (*)     MCH 25.5 (*)     MCHC 31.0 (*)     RDW 16.4 (*)     Mono # 1.1 (*)     All other components within normal limits   COMPREHENSIVE METABOLIC PANEL - Abnormal; Notable for the following components:    Sodium 135 (*)     CO2 22 (*)     BUN 33 (*)     Creatinine 3.3 (*)     Calcium 8.3 (*)     Albumin 3.3 (*)     Anion Gap 7 (*)     eGFR 20.8 (*)     All other components within normal limits   URINALYSIS, REFLEX TO URINE CULTURE -  Abnormal; Notable for the following components:    Appearance, UA Cloudy (*)     Protein, UA 1+ (*)     Glucose, UA 4+ (*)     Ketones, UA Trace (*)     Leukocytes, UA 2+ (*)     All other components within normal limits    Narrative:     Preferred Collection Type->Urine, Clean Catch  Specimen Source->Urine   URINALYSIS MICROSCOPIC - Abnormal; Notable for the following components:    RBC, UA 5 (*)     WBC, UA 50 (*)     All other components within normal limits    Narrative:     Preferred Collection Type->Urine, Clean Catch  Specimen Source->Urine   POCT INFLUENZA A/B MOLECULAR - Abnormal; Notable for the following components:    POC Molecular Influenza A Ag Positive (*)     All other components within normal limits   POCT GLUCOSE - Abnormal; Notable for the following components:    POCT Glucose 178 (*)     All other components within normal limits   CULTURE, URINE   MAGNESIUM   SARS-COV-2 RDRP GENE    Narrative:     This test utilizes isothermal nucleic acid amplification technology to detect the SARS-CoV-2 RdRp nucleic acid segment. The analytical sensitivity (limit of detection) is 500 copies/swab.     A POSITIVE result is indicative of the presence of SARS-CoV-2 RNA; clinical correlation with patient history and other diagnostic information is necessary to determine patient infection status.    A NEGATIVE result means that SARS-CoV-2 nucleic acids are not present above the limit of detection. A NEGATIVE result should be treated as presumptive. It does not rule out the possibility of COVID-19 and should not be the sole basis for treatment decisions. If COVID-19 is strongly suspected based on clinical and exposure history, re-testing using an alternate molecular assay should be considered.     This test is only for use under the Food and Drug Administration s Emergency Use Authorization (EUA).     Commercial kits are provided by [employer]. Performance characteristics of the EUA have been independently  verified by [facility] Department of Pathology and Laboratory Medicine.   _________________________________________________________________   The authorized Fact Sheet for Healthcare Providers and the authorized Fact Sheet for Patients of the ID NOW COVID-19 are available on the FDA website:    https://www.fda.gov/media/734129/download      https://www.fda.gov/media/204336/download             Imaging Results              X-Ray Chest PA And Lateral (Final result)  Result time 01/06/23 14:53:33      Final result by Maksim Landrum MD (01/06/23 14:53:33)                   Impression:      No evidence of an acute pulmonary process.      Electronically signed by: Maksim Landrum MD  Date:    01/06/2023  Time:    14:53               Narrative:    EXAMINATION:  XR CHEST PA AND LATERAL    CLINICAL HISTORY:  Cough    COMPARISON:  04/07/2022    FINDINGS:  Cardiac silhouette does not appear enlarged.  No focal infiltrate or pleural effusion identified.  No acute osseous finding.                                       Medications   sodium chloride 0.9% bolus 1,000 mL 1,000 mL (0 mLs Intravenous Stopped 1/6/23 1543)   cefTRIAXone (ROCEPHIN) 1 g in dextrose 5 % in water (D5W) 5 % 50 mL IVPB (MB+) (0 g Intravenous Stopped 1/6/23 1659)     Medical Decision Making:   History:   Old Medical Records: I decided to obtain old medical records.  Clinical Tests:   Lab Tests: Ordered and Reviewed  ED Management:  57-year-old male to ED for above complaints.  Swallowing for COVID and flu.  He was positive for flu.  Of generalized weakness therefore I obtain a CBC, CMP,  and magnesium.  He received 1 L of normal saline while in ED with some improvement in symptoms.  He was given Rocephin 1 g IV piggyback prior to discharge for his UA that was significant for 50 white blood cells 2+ leukocytes.  Chest x-ray was negative for any acute findings.  He was discharged home with a prescription for Keflex.  After he was discharged, I was his  reviewing his labs and I noticed that he had a creatinine of 3.3.  His last creatinine was 1.6 last April.  I called the patient to return to ED for re-evaluation of kidney functioning.  He stated that he will return.                        Clinical Impression:   Final diagnoses:  [R05.9] Cough  [J10.1] Influenza A (Primary)  [N30.90] Cystitis        ED Disposition Condition    Discharge Stable          ED Prescriptions       Medication Sig Dispense Start Date End Date Auth. Provider    cephALEXin (KEFLEX) 500 MG capsule Take 1 capsule (500 mg total) by mouth 4 (four) times daily. for 7 days 28 capsule 1/6/2023 1/13/2023 Yonis Irvin NP          Follow-up Information       Follow up With Specialties Details Why Contact Info    Elsy Miller NP Family Medicine In 2 days  [address] [place] [id number]  [phone]               Yonis Irvin NP  01/06/23 [id number]

## 2023-01-07 ENCOUNTER — HOSPITAL ENCOUNTER (EMERGENCY)
Facility: HOSPITAL | Age: 58
Discharge: HOME | End: 2023-01-07
Attending: EMERGENCY MEDICINE
Payer: MEDICAID

## 2023-01-07 VITALS
OXYGEN SATURATION: 98 % | RESPIRATION RATE: 16 BRPM | TEMPERATURE: 99 F | WEIGHT: 222 LBS | BODY MASS INDEX: 31.08 KG/M2 | DIASTOLIC BLOOD PRESSURE: 69 MMHG | HEART RATE: 80 BPM | SYSTOLIC BLOOD PRESSURE: 117 MMHG | HEIGHT: 71 IN

## 2023-01-07 DIAGNOSIS — R79.89 ABNORMAL BLOOD CREATININE LEVEL: Primary | ICD-10-CM

## 2023-01-07 LAB
ANION GAP SERPL CALC-SCNC: 5 MMOL/L (ref 8–16)
BUN SERPL-MCNC: 24 MG/DL (ref 6–20)
CALCIUM SERPL-MCNC: 8.1 MG/DL (ref 8.7–10.5)
CHLORIDE SERPL-SCNC: 113 MMOL/L (ref 95–110)
CO2 SERPL-SCNC: 22 MMOL/L (ref 23–29)
CREAT SERPL-MCNC: 1.7 MG/DL (ref 0.5–1.4)
EST. GFR  (NO RACE VARIABLE): 46.2 ML/MIN/1.73 M^2
GLUCOSE SERPL-MCNC: 89 MG/DL (ref 70–110)
POTASSIUM SERPL-SCNC: 4.6 MMOL/L (ref 3.5–5.1)
SODIUM SERPL-SCNC: 140 MMOL/L (ref 136–145)

## 2023-01-07 PROCEDURE — 25000003 PHARM REV CODE 250: Performed by: EMERGENCY MEDICINE

## 2023-01-07 PROCEDURE — 80048 BASIC METABOLIC PNL TOTAL CA: CPT | Performed by: EMERGENCY MEDICINE

## 2023-01-07 PROCEDURE — 96360 HYDRATION IV INFUSION INIT: CPT

## 2023-01-07 PROCEDURE — 99284 EMERGENCY DEPT VISIT MOD MDM: CPT | Mod: 25

## 2023-01-07 RX ADMIN — SODIUM CHLORIDE 1000 ML: 9 INJECTION, SOLUTION INTRAVENOUS at 02:01

## 2023-01-07 NOTE — ED PROVIDER NOTES
Encounter Date: 1/7/2023       History     Chief Complaint   Patient presents with    Abnormal Lab     Asked to return to ED for creatine of 3.3.     57-year-old male diagnosed with influenza yesterday, history of diabetes hyperlipidemia hypertension was seen here yesterday, labs drawn, creatinine was up to 3.3 - patient is without complaints.  Told to come here to get IV fluids.  States he is urinating well, no chest pain or shortness of breath.  No other issues.  Alert oriented x4, GCS is 15.  Pleasant and polite.    Review of patient's allergies indicates:  No Known Allergies  Past Medical History:   Diagnosis Date    Acid reflux     Diabetes mellitus     Drug abuse     18 YEARS AGO    Glaucoma     Hyperlipidemia     Hypertension     PAD (peripheral artery disease)      Past Surgical History:   Procedure Laterality Date    LEG SURGERY       Family History   Problem Relation Age of Onset    Heart disease Mother 85    Kidney disease Mother     Cancer Father 49    No Known Problems Sister     No Known Problems Daughter     No Known Problems Maternal Aunt     No Known Problems Maternal Uncle     No Known Problems Paternal Aunt     No Known Problems Paternal Uncle     No Known Problems Maternal Grandmother     No Known Problems Maternal Grandfather     No Known Problems Paternal Grandmother     No Known Problems Paternal Grandfather     No Known Problems Brother     No Known Problems Sister     No Known Problems Sister     No Known Problems Sister     No Known Problems Sister     No Known Problems Sister     Breast cancer Neg Hx     Ovarian cancer Neg Hx     BRCA 1/2 Neg Hx      Social History     Tobacco Use    Smoking status: Every Day     Packs/day: 2.00     Years: 40.00     Pack years: 80.00     Types: Cigars, Cigarettes    Smokeless tobacco: Never    Tobacco comments:     pt cutting back    Substance Use Topics    Alcohol use: Not Currently    Drug use: No     Review of Systems   Constitutional:  Negative for  fever.   HENT:  Negative for sore throat.    Respiratory:  Negative for shortness of breath.    Cardiovascular:  Negative for chest pain.   Gastrointestinal:  Negative for nausea.   Genitourinary:  Negative for dysuria.   Musculoskeletal:  Negative for back pain.   Skin:  Negative for rash.   Neurological:  Negative for weakness.   Hematological:  Does not bruise/bleed easily.   All other systems reviewed and are negative.    Physical Exam     Initial Vitals [01/07/23 1403]   BP Pulse Resp Temp SpO2   117/69 82 18 98.5 °F (36.9 °C) 98 %      MAP       --         Physical Exam    Nursing note and vitals reviewed.  Constitutional: He appears well-developed and well-nourished. He is not diaphoretic. No distress.   HENT:   Head: Normocephalic and atraumatic.   Eyes: Conjunctivae and EOM are normal. Pupils are equal, round, and reactive to light. Right eye exhibits no discharge. Left eye exhibits no discharge. No scleral icterus.   Neck: Neck supple. No JVD present.   Normal range of motion.  Cardiovascular:  Normal rate, regular rhythm, normal heart sounds and intact distal pulses.           No murmur heard.  Pulmonary/Chest: Breath sounds normal. No stridor. No respiratory distress. He has no wheezes. He has no rhonchi. He has no rales. He exhibits no tenderness.   Abdominal: Abdomen is soft. Bowel sounds are normal. He exhibits no distension and no mass. There is no abdominal tenderness. There is no rebound and no guarding.   Musculoskeletal:         General: No tenderness or edema. Normal range of motion.      Cervical back: Normal range of motion and neck supple.     Neurological: He is alert and oriented to person, place, and time. He has normal strength. GCS score is 15. GCS eye subscore is 4. GCS verbal subscore is 5. GCS motor subscore is 6.   Skin: Skin is warm and dry. Capillary refill takes less than 2 seconds.   Psychiatric: He has a normal mood and affect. Thought content normal.       ED Course    Procedures  Labs Reviewed   BASIC METABOLIC PANEL - Abnormal; Notable for the following components:       Result Value    Chloride 113 (*)     CO2 22 (*)     BUN 24 (*)     Creatinine 1.7 (*)     Calcium 8.1 (*)     Anion Gap 5 (*)     eGFR 46.2 (*)     All other components within normal limits          Imaging Results    None          Medications   sodium chloride 0.9% bolus 1,000 mL 1,000 mL (0 mLs Intravenous Stopped 1/7/23 1529)     Medical Decision Making:   Differential Diagnosis:   Chronic renal insufficiency, dehydration, acute renal injury, sequelae of influenza  ED Management:  Patient is diabetic and hypertensive.  His creatinine has been rising the past few months.  Unsure if this is an acute issue versus chronic.  Will give 1 L normal saline, repeat BMP.  He is asymptomatic.  Regardless he is stable for discharge with follow-up           ED Course as of 01/07/23 1555   Sat Jan 07, 2023   1552 Creatinine is back to his 9 month ago level.  Stable for discharge [SD]      ED Course User Index  [SD] Timbo Ramírez MD                 Clinical Impression:   Final diagnoses:  [R79.89] Abnormal blood creatinine level (Primary)        ED Disposition Condition    Discharge Stable          ED Prescriptions    None       Follow-up Information       Follow up With Specialties Details Why Contact Info    Primary care physician  In 2 days               Timbo Ramírez MD  01/07/23 2845       Timbo Ramírez MD  01/07/23 2719

## 2023-01-08 LAB — BACTERIA UR CULT: NORMAL

## 2023-02-08 DIAGNOSIS — F17.200 NICOTINE DEPENDENCE, UNSPECIFIED, UNCOMPLICATED: Primary | ICD-10-CM

## 2023-02-10 ENCOUNTER — HOSPITAL ENCOUNTER (OUTPATIENT)
Dept: RADIOLOGY | Facility: HOSPITAL | Age: 58
Discharge: HOME | End: 2023-02-10
Attending: NURSE PRACTITIONER
Payer: MEDICAID

## 2023-02-10 DIAGNOSIS — F17.200 NICOTINE DEPENDENCE, UNSPECIFIED, UNCOMPLICATED: ICD-10-CM

## 2023-02-10 PROCEDURE — 71271 CT THORAX LUNG CANCER SCR C-: CPT | Mod: TC

## 2023-02-20 LAB — HBA1C MFR BLD: 6.2 % (ref 4–6)

## 2023-02-27 ENCOUNTER — OFFICE VISIT (OUTPATIENT)
Dept: PRIMARY CARE CLINIC | Facility: CLINIC | Age: 58
End: 2023-02-27
Payer: MEDICAID

## 2023-02-27 VITALS
RESPIRATION RATE: 15 BRPM | WEIGHT: 232 LBS | HEART RATE: 62 BPM | HEIGHT: 71 IN | DIASTOLIC BLOOD PRESSURE: 89 MMHG | OXYGEN SATURATION: 97 % | BODY MASS INDEX: 32.48 KG/M2 | SYSTOLIC BLOOD PRESSURE: 146 MMHG | TEMPERATURE: 98 F

## 2023-02-27 DIAGNOSIS — N18.32 STAGE 3B CHRONIC KIDNEY DISEASE: ICD-10-CM

## 2023-02-27 DIAGNOSIS — I10 PRIMARY HYPERTENSION: Primary | ICD-10-CM

## 2023-02-27 DIAGNOSIS — H40.9 GLAUCOMA, UNSPECIFIED GLAUCOMA TYPE, UNSPECIFIED LATERALITY: Chronic | ICD-10-CM

## 2023-02-27 DIAGNOSIS — Z79.4 TYPE 2 DIABETES MELLITUS WITHOUT COMPLICATION, WITH LONG-TERM CURRENT USE OF INSULIN: ICD-10-CM

## 2023-02-27 DIAGNOSIS — E11.9 TYPE 2 DIABETES MELLITUS WITHOUT COMPLICATION, WITH LONG-TERM CURRENT USE OF INSULIN: ICD-10-CM

## 2023-02-27 DIAGNOSIS — K21.9 GASTROESOPHAGEAL REFLUX DISEASE, UNSPECIFIED WHETHER ESOPHAGITIS PRESENT: ICD-10-CM

## 2023-02-27 DIAGNOSIS — G47.9 SLEEPING DIFFICULTIES: ICD-10-CM

## 2023-02-27 DIAGNOSIS — E78.5 HYPERLIPIDEMIA, UNSPECIFIED HYPERLIPIDEMIA TYPE: ICD-10-CM

## 2023-02-27 DIAGNOSIS — G56.93 BILATERAL NEUROPATHY OF UPPER EXTREMITIES: ICD-10-CM

## 2023-02-27 DIAGNOSIS — I73.9 PAD (PERIPHERAL ARTERY DISEASE): ICD-10-CM

## 2023-02-27 LAB
ANION GAP SERPL CALC-SCNC: 2 MMOL/L (ref 8–16)
BUN SERPL-MCNC: 9 MG/DL (ref 6–20)
CALCIUM SERPL-MCNC: 8.9 MG/DL (ref 8.7–10.5)
CHLORIDE SERPL-SCNC: 110 MMOL/L (ref 95–110)
CO2 SERPL-SCNC: 29 MMOL/L (ref 23–29)
CREAT SERPL-MCNC: 1 MG/DL (ref 0.5–1.4)
EST. GFR  (NO RACE VARIABLE): >60 ML/MIN/1.73 M^2
GLUCOSE SERPL-MCNC: 110 MG/DL (ref 70–110)
MAGNESIUM SERPL-MCNC: 2.3 MG/DL (ref 1.6–2.6)
POTASSIUM SERPL-SCNC: 4.4 MMOL/L (ref 3.5–5.1)
SODIUM SERPL-SCNC: 141 MMOL/L (ref 136–145)

## 2023-02-27 PROCEDURE — 1160F PR REVIEW ALL MEDS BY PRESCRIBER/CLIN PHARMACIST DOCUMENTED: ICD-10-PCS | Mod: CPTII,,, | Performed by: STUDENT IN AN ORGANIZED HEALTH CARE EDUCATION/TRAINING PROGRAM

## 2023-02-27 PROCEDURE — 3079F PR MOST RECENT DIASTOLIC BLOOD PRESSURE 80-89 MM HG: ICD-10-PCS | Mod: CPTII,,, | Performed by: STUDENT IN AN ORGANIZED HEALTH CARE EDUCATION/TRAINING PROGRAM

## 2023-02-27 PROCEDURE — 4010F PR ACE/ARB THEARPY RXD/TAKEN: ICD-10-PCS | Mod: CPTII,,, | Performed by: STUDENT IN AN ORGANIZED HEALTH CARE EDUCATION/TRAINING PROGRAM

## 2023-02-27 PROCEDURE — 1160F RVW MEDS BY RX/DR IN RCRD: CPT | Mod: CPTII,,, | Performed by: STUDENT IN AN ORGANIZED HEALTH CARE EDUCATION/TRAINING PROGRAM

## 2023-02-27 PROCEDURE — 99204 PR OFFICE/OUTPT VISIT, NEW, LEVL IV, 45-59 MIN: ICD-10-PCS | Mod: S$PBB,,, | Performed by: STUDENT IN AN ORGANIZED HEALTH CARE EDUCATION/TRAINING PROGRAM

## 2023-02-27 PROCEDURE — 99204 OFFICE O/P NEW MOD 45 MIN: CPT | Mod: S$PBB,,, | Performed by: STUDENT IN AN ORGANIZED HEALTH CARE EDUCATION/TRAINING PROGRAM

## 2023-02-27 PROCEDURE — 3008F PR BODY MASS INDEX (BMI) DOCUMENTED: ICD-10-PCS | Mod: CPTII,,, | Performed by: STUDENT IN AN ORGANIZED HEALTH CARE EDUCATION/TRAINING PROGRAM

## 2023-02-27 PROCEDURE — 1159F MED LIST DOCD IN RCRD: CPT | Mod: CPTII,,, | Performed by: STUDENT IN AN ORGANIZED HEALTH CARE EDUCATION/TRAINING PROGRAM

## 2023-02-27 PROCEDURE — 99215 OFFICE O/P EST HI 40 MIN: CPT | Mod: PBBFAC,PN | Performed by: STUDENT IN AN ORGANIZED HEALTH CARE EDUCATION/TRAINING PROGRAM

## 2023-02-27 PROCEDURE — 4010F ACE/ARB THERAPY RXD/TAKEN: CPT | Mod: CPTII,,, | Performed by: STUDENT IN AN ORGANIZED HEALTH CARE EDUCATION/TRAINING PROGRAM

## 2023-02-27 PROCEDURE — 1159F PR MEDICATION LIST DOCUMENTED IN MEDICAL RECORD: ICD-10-PCS | Mod: CPTII,,, | Performed by: STUDENT IN AN ORGANIZED HEALTH CARE EDUCATION/TRAINING PROGRAM

## 2023-02-27 PROCEDURE — 99999 PR PBB SHADOW E&M-EST. PATIENT-LVL V: ICD-10-PCS | Mod: PBBFAC,,, | Performed by: STUDENT IN AN ORGANIZED HEALTH CARE EDUCATION/TRAINING PROGRAM

## 2023-02-27 PROCEDURE — 80048 BASIC METABOLIC PNL TOTAL CA: CPT | Performed by: STUDENT IN AN ORGANIZED HEALTH CARE EDUCATION/TRAINING PROGRAM

## 2023-02-27 PROCEDURE — 3077F PR MOST RECENT SYSTOLIC BLOOD PRESSURE >= 140 MM HG: ICD-10-PCS | Mod: CPTII,,, | Performed by: STUDENT IN AN ORGANIZED HEALTH CARE EDUCATION/TRAINING PROGRAM

## 2023-02-27 PROCEDURE — 3008F BODY MASS INDEX DOCD: CPT | Mod: CPTII,,, | Performed by: STUDENT IN AN ORGANIZED HEALTH CARE EDUCATION/TRAINING PROGRAM

## 2023-02-27 PROCEDURE — 83735 ASSAY OF MAGNESIUM: CPT | Performed by: STUDENT IN AN ORGANIZED HEALTH CARE EDUCATION/TRAINING PROGRAM

## 2023-02-27 PROCEDURE — 99999 PR PBB SHADOW E&M-EST. PATIENT-LVL V: CPT | Mod: PBBFAC,,, | Performed by: STUDENT IN AN ORGANIZED HEALTH CARE EDUCATION/TRAINING PROGRAM

## 2023-02-27 PROCEDURE — 3077F SYST BP >= 140 MM HG: CPT | Mod: CPTII,,, | Performed by: STUDENT IN AN ORGANIZED HEALTH CARE EDUCATION/TRAINING PROGRAM

## 2023-02-27 PROCEDURE — 3079F DIAST BP 80-89 MM HG: CPT | Mod: CPTII,,, | Performed by: STUDENT IN AN ORGANIZED HEALTH CARE EDUCATION/TRAINING PROGRAM

## 2023-02-27 RX ORDER — VALSARTAN 320 MG/1
320 TABLET ORAL DAILY
COMMUNITY
End: 2023-02-27

## 2023-02-27 RX ORDER — VALSARTAN 160 MG/1
160 TABLET ORAL DAILY
Qty: 30 TABLET | Refills: 2 | Status: SHIPPED | OUTPATIENT
Start: 2023-02-27 | End: 2023-05-23

## 2023-02-27 RX ORDER — GABAPENTIN 800 MG/1
400 TABLET ORAL 3 TIMES DAILY
COMMUNITY
Start: 2023-02-17 | End: 2023-11-27 | Stop reason: SDUPTHER

## 2023-02-27 RX ORDER — ROSUVASTATIN CALCIUM 20 MG/1
20 TABLET, COATED ORAL
COMMUNITY
Start: 2023-02-06 | End: 2023-05-26

## 2023-02-27 RX ORDER — AMITRIPTYLINE HYDROCHLORIDE 100 MG/1
100 TABLET ORAL NIGHTLY
COMMUNITY
Start: 2023-02-06 | End: 2023-11-03 | Stop reason: SDUPTHER

## 2023-02-27 RX ORDER — FERROUS SULFATE 325(65) MG
TABLET, DELAYED RELEASE (ENTERIC COATED) ORAL
COMMUNITY
Start: 2023-02-10 | End: 2023-11-03 | Stop reason: SDUPTHER

## 2023-02-27 RX ORDER — DORZOLAMIDE HYDROCHLORIDE AND TIMOLOL MALEATE 20; 5 MG/ML; MG/ML
SOLUTION/ DROPS OPHTHALMIC
COMMUNITY
Start: 2022-11-28

## 2023-02-27 RX ORDER — OMEPRAZOLE 20 MG/1
20 CAPSULE, DELAYED RELEASE ORAL EVERY MORNING
COMMUNITY
Start: 2023-02-20

## 2023-02-27 RX ORDER — CHLORTHALIDONE 25 MG/1
12.5 TABLET ORAL DAILY
Qty: 15 TABLET | Refills: 2 | Status: SHIPPED | OUTPATIENT
Start: 2023-02-27 | End: 2023-05-23

## 2023-02-27 RX ORDER — TIOTROPIUM BROMIDE AND OLODATEROL 3.124; 2.736 UG/1; UG/1
SPRAY, METERED RESPIRATORY (INHALATION)
COMMUNITY
Start: 2022-12-05

## 2023-02-27 NOTE — ASSESSMENT & PLAN NOTE
Wt Readings from Last 8 Encounters:   02/27/23 105.2 kg (232 lb)   01/07/23 100.7 kg (222 lb)   01/06/23 101.6 kg (224 lb)   04/07/22 106.6 kg (235 lb)   03/10/22 107.7 kg (237 lb 7 oz)   03/10/22 107.7 kg (237 lb 7 oz)   12/02/21 107.5 kg (237 lb)   06/24/21 97.2 kg (214 lb 4.6 oz)     Recommendations:   Stay physically active. As tolerated alternate resistance training with stretching and cardio. Goal of 150 minutes per week of moderate intensity activity or 7,500 - 10,000 steps per day. Follow the Mediterranean Diet. Include whole fresh fruits, vegetables, olive oil, seeds, nuts, whole grains, cold water fish, salmon, mackerel and lean cuts of meat.  Do not drink sugary/diet carbonated beverages. Decrease portion sizes slightly which will result in an approximately 500-calorie deficit. Avoid fast or fried and processed food, especially canned foods. Avoid refined carbohydrates, white starchy foods, flour, white potato, bread, muffins, and cakes. Consider substituting one meal a day with a meal replacement such as Slim fast, lean cuisine, or weight watcher's. Follow a healthy diet that includes enough calcium, vitamin D and proteins for bone health.

## 2023-02-27 NOTE — ASSESSMENT & PLAN NOTE
Takes daily prilosec 20 mg daily. Helps with symptoms. No EGD study in the past.   - f/u general surgery

## 2023-02-27 NOTE — ASSESSMENT & PLAN NOTE
Patient recently stopped taking all his antihyperglycemics secondary to renal function decline.   Latest HgA1C 6.2% on 2/20/23  - f/u endocrinology

## 2023-02-27 NOTE — ASSESSMENT & PLAN NOTE
Left leg claudication. Currently taking   - cilostazol 100 mg daily  - daily aspirin, plavix, and statin  - f/u cardiology to review recent ultrasound study

## 2023-02-27 NOTE — ASSESSMENT & PLAN NOTE
Have right arm ulnar nerve injury. Follows [facility] neurology in [place]. Follow up 3/8/2023.   - change to Gabapentin 400 mg TID from 800 mg BID   - f/u neurology

## 2023-02-27 NOTE — ASSESSMENT & PLAN NOTE
Recently stopped taking all his BP medications.   - resume daily carvedilol 12.5 mg BID  - resume valsartan 160 mg daily  -

## 2023-02-27 NOTE — ASSESSMENT & PLAN NOTE
Take two forms of eye drops. Timolol and latanoprost. Follow up appointment with opthalmology in March 2023.

## 2023-02-27 NOTE — PROGRESS NOTES
"[facility] Note    HPI:  Jayden Canada Jr. is a 58 y.o. male who presents today for Establish Care (Referred by [facility] for higher level of care)  58-year-old male with hypertension, hyperlipidemia, vitamin-D deficiency, anemia, diabetes mellitus type 2, GERD, "early" lupus, chronic kidney disease stage 3B, peripheral artery disease presents to establish care.     He recently was taken off his BP medications due to renal function decline and hyperkalemia. Patient also inadvertently discontinued carvedilol.     Care coordination:  Cardiology, Dr. Aguayo  Endocrinology, Dr. Cortez  Neurology, Dr. Garcia  Nephrology, Dr. Yung  [facility]. Dr. Sharma    22 Cologuard negative, denies blood in stool or changes in caliber, color.    Denies fever, chills, vision changes, ear, eye pain, excessive headache, chest pain, palpitations, shortness of breath, abdominal pain, nausea, vomiting, voiding or stooling problems.   ROS   A review of systems was performed and was negative except as noted above.    I personally reviewed allergies, past medical, surgical, social and family history and updated as appropriate.    Medications:    Current Outpatient Medications:     amitriptyline (ELAVIL) 100 MG tablet, Take 100 mg by mouth every evening., Disp: , Rfl:     aspirin (ECOTRIN) 81 MG EC tablet, Take 81 mg by mouth once daily. NOTIFIED DR. GAINES OFFICE, Disp: , Rfl:     baclofen (LIORESAL) 10 MG tablet, Take 10 mg by mouth 2 (two) times daily as needed., Disp: , Rfl:     cilostazol (PLETAL) 100 MG Tab, , Disp: , Rfl:     clopidogreL (PLAVIX) 75 mg tablet, Take 75 mg by mouth once daily. NOTIFIED DR. GAINES OFFICE, Disp: , Rfl:     dorzolamide-timolol 2-0.5% (COSOPT) 22.3-6.8 mg/mL ophthalmic solution, SMARTSI Drop(s) In Eye(s) Every 12 Hours, Disp: , Rfl:     ferrous sulfate 325 (65 FE) MG EC tablet, Take by mouth., Disp: , Rfl:     gabapentin (NEURONTIN) 800 MG tablet, Take 400 mg by mouth 3 " (three) times daily., Disp: , Rfl:     latanoprost 0.005 % ophthalmic solution, , Disp: , Rfl:     omeprazole (PRILOSEC) 20 MG capsule, Take 20 mg by mouth every morning., Disp: , Rfl:     rosuvastatin (CRESTOR) 20 MG tablet, Take 20 mg by mouth., Disp: , Rfl:     STIOLTO RESPIMAT 2.5-2.5 mcg/actuation Mist, SMARTSI Puff(s) By Mouth Daily, Disp: , Rfl:     albuterol (PROVENTIL/VENTOLIN HFA) 90 mcg/actuation inhaler, Inhale 1-2 puffs into the lungs every 6 (six) hours as needed. Rescue, Disp: 18 g, Rfl: 0    carvediloL (COREG) 12.5 MG tablet, Take 12.5 mg by mouth 2 (two) times daily., Disp: , Rfl:     chlorthalidone (HYGROTEN) 25 MG Tab, Take 0.5 tablets (12.5 mg total) by mouth once daily., Disp: 15 tablet, Rfl: 2    JARDIANCE 25 mg tablet, TAKE 1 TABLET BY MOUTH ONCE DAILY FOR DIABETES, Disp: , Rfl:     valsartan (DIOVAN) 160 MG tablet, Take 1 tablet (160 mg total) by mouth once daily., Disp: 30 tablet, Rfl: 2     Health Maintenance:  Immunization History   Administered Date(s) Administered    COVID-19, MRNA, LN-S, PF (MODERNA FULL 0.5 ML DOSE) 2021, 2021    DTP 1966, 1966, 1966, 1968, 10/28/1969, 2010    Hepatitis B, Pediatric/Adolescent 02/10/2012    IPV 1966, 1967, 1967, 10/28/1969    Influenza - Quadrivalent - PF *Preferred* (6 months and older) 10/12/2016, 2017, 10/04/2018, 10/03/2019, 10/13/2021, 2022, 2023    MMR 1966, 1967, 1971, 08/15/1973    Pneumococcal Conjugate - 13 Valent 2019    Pneumococcal Conjugate - 20 Valent 2022      Health Maintenance   Topic Date Due    Foot Exam  Never done    Eye Exam  Never done    TETANUS VACCINE  Never done    Lipid Panel  2022    Hemoglobin A1c  2023    Low Dose Statin  2024    Hepatitis C Screening  Completed     Health Maintenance Topics with due status: Not Due       Topic Last Completion Date    Hemoglobin A1c 2023    Low Dose  "Statin 02/27/2023     Health Maintenance Due   Topic Date Due    Diabetes Urine Screening  Never done    Foot Exam  Never done    Eye Exam  Never done    HIV Screening  Never done    TETANUS VACCINE  Never done    Colorectal Cancer Screening  Never done    Shingles Vaccine (1 of 2) Never done    COVID-19 Vaccine (3 - Booster for Moderna series) 06/01/2021    Lipid Panel  06/26/2022     PHYSICAL EXAM:  Vitals:    02/27/23 1028 02/27/23 1055 02/27/23 1200   BP: (!) 167/85 (!) 162/86 (!) 146/89   BP Location: Right arm     Patient Position: Sitting     BP Method: Large (Automatic)     Pulse: 70 62    Resp: 15     Temp: 98.2 °F (36.8 °C)     TempSrc: Oral     SpO2: 97%     Weight: 105.2 kg (232 lb)     Height: 5' 11" (1.803 m)       Body mass index is 32.36 kg/m².  Physical Exam  Vitals reviewed.   Constitutional:       General: He is not in acute distress.     Appearance: Normal appearance. He is not ill-appearing or toxic-appearing.   HENT:      Head: Normocephalic and atraumatic.      Right Ear: External ear normal.      Left Ear: External ear normal.      Nose: Nose normal.      Mouth/Throat:      Mouth: Mucous membranes are moist.      Pharynx: Oropharynx is clear.   Eyes:      Extraocular Movements: Extraocular movements intact.      Conjunctiva/sclera: Conjunctivae normal.   Cardiovascular:      Rate and Rhythm: Normal rate and regular rhythm.      Pulses: Normal pulses.      Heart sounds: Normal heart sounds. No murmur heard.  Pulmonary:      Effort: Pulmonary effort is normal. No respiratory distress.      Breath sounds: Normal breath sounds. No wheezing or rales.   Abdominal:      General: Abdomen is flat. Bowel sounds are normal. There is no distension.      Palpations: Abdomen is soft. There is no mass.      Tenderness: There is no abdominal tenderness. There is no right CVA tenderness, left CVA tenderness or guarding.   Musculoskeletal:      Cervical back: Normal range of motion and neck supple. No " rigidity.      Right lower leg: No edema.      Left lower leg: No edema.   Skin:     General: Skin is warm and dry.   Neurological:      General: No focal deficit present.      Mental Status: He is alert and oriented to person, place, and time. Mental status is at baseline.      Sensory: Sensory deficit present.      Motor: No weakness.      Gait: Gait normal.      Comments: Right hand and finger loss of sensation in ulnar distribution   Psychiatric:         Mood and Affect: Mood normal.         Behavior: Behavior normal.         Thought Content: Thought content normal.      ASSESSMENT/PLAN:  1. Primary hypertension  Assessment & Plan:  Recently stopped taking all his BP medications.   - resume daily carvedilol 12.5 mg BID  - resume valsartan 160 mg daily  -     Orders:  -     chlorthalidone (HYGROTEN) 25 MG Tab; Take 0.5 tablets (12.5 mg total) by mouth once daily.  Dispense: 15 tablet; Refill: 2  -     Basic Metabolic Panel; Future; Expected date: 02/27/2023  -     Magnesium; Future; Expected date: 02/27/2023    2. Stage 3b chronic kidney disease  -     Basic Metabolic Panel; Future; Expected date: 02/27/2023    3. Glaucoma, unspecified glaucoma type, unspecified laterality  Assessment & Plan:  Take two forms of eye drops. Timolol and latanoprost. Follow up appointment with opthalmology in March 2023.       4. Bilateral neuropathy of upper extremities  Assessment & Plan:  Have right arm ulnar nerve injury. Follow[place] [facility] [place]. Follow up 3/8/2023.   - change to Gabapentin 400 mg TID from 800 mg BID   - f/u neurology    Orders:  -     Magnesium; Future; Expected date: 02/27/2023    5. Type 2 diabetes mellitus without complication, with long-term current use of insulin  Assessment & Plan:  Patient recently stopped taking all his antihyperglycemics secondary to renal function decline.   Latest HgA1C 6.2% on 2/20/23  - f/u endocrinology      6. BMI 33.0-33.9,adult  Assessment & Plan:  Wt Readings from Last 8  Encounters:   02/27/23 105.2 kg (232 lb)   01/07/23 100.7 kg (222 lb)   01/06/23 101.6 kg (224 lb)   04/07/22 106.6 kg (235 lb)   03/10/22 107.7 kg (237 lb 7 oz)   03/10/22 107.7 kg (237 lb 7 oz)   12/02/21 107.5 kg (237 lb)   06/24/21 97.2 kg (214 lb 4.6 oz)     Recommendations:   Stay physically active. As tolerated alternate resistance training with stretching and cardio. Goal of 150 minutes per week of moderate intensity activity or 7,500 - 10,000 steps per day. Follow the Mediterranean Diet. Include whole fresh fruits, vegetables, olive oil, seeds, nuts, whole grains, cold water fish, salmon, mackerel and lean cuts of meat.  Do not drink sugary/diet carbonated beverages. Decrease portion sizes slightly which will result in an approximately 500-calorie deficit. Avoid fast or fried and processed food, especially canned foods. Avoid refined carbohydrates, white starchy foods, flour, white potato, bread, muffins, and cakes. Consider substituting one meal a day with a meal replacement such as Slim fast, lean cuisine, or weight watcher's. Follow a healthy diet that includes enough calcium, vitamin D and proteins for bone health.          7. Sleeping difficulties  Assessment & Plan:  Per patient over 2 decades he has not slept well at bedtime. Currently taking elavil 100 mg which helps him get some rest.   Reviewed at length sleep hygiene practices to incorporate at bedtime.  - go to bed when sleepy   - no TV watching in bed  - if you do not fall asleep within 20-30 minutes at the beginning of the night or after awakening, then get out of bed  -  a thick boring book to read   - avoid any naps during the day  - take a warm bath or shower before bed and allow body temperature to drop to signal brain to prepare for sleep  - avoid caffeine, nicotine products, alcohol, large meals within two hours of bedtime             8. PAD (peripheral artery disease)  Assessment & Plan:  Left leg claudication. Currently taking    - cilostazol 100 mg daily  - daily aspirin, plavix, and statin  - f/u cardiology to review recent ultrasound study        9. Hyperlipidemia, unspecified hyperlipidemia type  Assessment & Plan:  Continue with daily statin.   - rosuvastatin 20 mg daily      10. Gastroesophageal reflux disease, unspecified whether esophagitis present  Assessment & Plan:  Takes daily prilosec 20 mg daily. Helps with symptoms. No EGD study in the past.   - f/u general surgery      Other orders  -     valsartan (DIOVAN) 160 MG tablet; Take 1 tablet (160 mg total) by mouth once daily.  Dispense: 30 tablet; Refill: 2        Other than changes above, continue current medications and maintain follow up with specialists.      No follow-ups on file.   Recent Results (from the past 2016 hour(s))   POCT glucose    Collection Time: 01/06/23  2:26 PM   Result Value Ref Range    POCT Glucose 178 (H) 70 - 110 mg/dL   Urinalysis, Reflex to Urine Culture Urine, Clean Catch    Collection Time: 01/06/23  2:36 PM    Specimen: Urine, Clean Catch   Result Value Ref Range    Specimen UA Urine, Clean Catch     Color, UA Yellow Yellow, Straw, Shabnam    Appearance, UA Cloudy (A) Clear    pH, UA 5.0 5.0 - 8.0    Specific Gravity, UA 1.020 1.005 - 1.030    Protein, UA 1+ (A) Negative    Glucose, UA 4+ (A) Negative    Ketones, UA Trace (A) Negative    Bilirubin (UA) Negative Negative    Occult Blood UA Negative Negative    Nitrite, UA Negative Negative    Urobilinogen, UA 1.0 <2.0 EU/dL    Leukocytes, UA 2+ (A) Negative   Urinalysis Microscopic    Collection Time: 01/06/23  2:36 PM   Result Value Ref Range    RBC, UA 5 (H) 0 - 4 /hpf    WBC, UA 50 (H) 0 - 5 /hpf    Bacteria Negative None-Occ /hpf    Yeast, UA None None    Squam Epithel, UA 4 /hpf    Hyaline Casts, UA 0 0-1/lpf /lpf    Microscopic Comment SEE COMMENT    Urine culture    Collection Time: 01/06/23  2:36 PM    Specimen: Urine   Result Value Ref Range    Urine Culture, Routine No significant growth     CBC auto differential    Collection Time: 01/06/23  2:45 PM   Result Value Ref Range    WBC 8.96 3.90 - 12.70 K/uL    RBC 3.64 (L) 4.60 - 6.20 M/uL    Hemoglobin 9.3 (L) 14.0 - 18.0 g/dL    Hematocrit 30.0 (L) 40.0 - 54.0 %    MCV 82 82 - 98 fL    MCH 25.5 (L) 27.0 - 31.0 pg    MCHC 31.0 (L) 32.0 - 36.0 g/dL    RDW 16.4 (H) 11.5 - 14.5 %    Platelets 367 150 - 450 K/uL    MPV 10.3 9.2 - 12.9 fL    Immature Granulocytes 0.3 0.0 - 0.5 %    Gran # (ANC) 6.0 1.8 - 7.7 K/uL    Immature Grans (Abs) 0.03 0.00 - 0.04 K/uL    Lymph # 1.6 1.0 - 4.8 K/uL    Mono # 1.1 (H) 0.3 - 1.0 K/uL    Eos # 0.1 0.0 - 0.5 K/uL    Baso # 0.02 0.00 - 0.20 K/uL    nRBC 0 0 /100 WBC    Gran % 67.2 38.0 - 73.0 %    Lymph % 18.3 18.0 - 48.0 %    Mono % 12.5 4.0 - 15.0 %    Eosinophil % 1.5 0.0 - 8.0 %    Basophil % 0.2 0.0 - 1.9 %    Differential Method Automated    Comprehensive metabolic panel    Collection Time: 01/06/23  2:45 PM   Result Value Ref Range    Sodium 135 (L) 136 - 145 mmol/L    Potassium 4.3 3.5 - 5.1 mmol/L    Chloride 106 95 - 110 mmol/L    CO2 22 (L) 23 - 29 mmol/L    Glucose 88 70 - 110 mg/dL    BUN 33 (H) 6 - 20 mg/dL    Creatinine 3.3 (H) 0.5 - 1.4 mg/dL    Calcium 8.3 (L) 8.7 - 10.5 mg/dL    Total Protein 8.0 6.0 - 8.4 g/dL    Albumin 3.3 (L) 3.5 - 5.2 g/dL    Total Bilirubin 0.5 0.1 - 1.0 mg/dL    Alkaline Phosphatase 98 55 - 135 U/L    AST 13 10 - 40 U/L    ALT 19 10 - 44 U/L    Anion Gap 7 (L) 8 - 16 mmol/L    eGFR 20.8 (A) >60 mL/min/1.73 m^2   Magnesium    Collection Time: 01/06/23  2:45 PM   Result Value Ref Range    Magnesium 2.2 1.6 - 2.6 mg/dL   POCT Influenza A/B Molecular    Collection Time: 01/06/23  3:03 PM   Result Value Ref Range    POC Molecular Influenza A Ag Positive (A) Negative, Not Reported    POC Molecular Influenza B Ag Negative Negative, Not Reported     Acceptable Yes    POCT COVID-19 Rapid Screening    Collection Time: 01/06/23  3:03 PM   Result Value Ref Range    POC Rapid COVID  Negative Negative     Acceptable Yes    Basic metabolic panel    Collection Time: 01/07/23  3:30 PM   Result Value Ref Range    Sodium 140 136 - 145 mmol/L    Potassium 4.6 3.5 - 5.1 mmol/L    Chloride 113 (H) 95 - 110 mmol/L    CO2 22 (L) 23 - 29 mmol/L    Glucose 89 70 - 110 mg/dL    BUN 24 (H) 6 - 20 mg/dL    Creatinine 1.7 (H) 0.5 - 1.4 mg/dL    Calcium 8.1 (L) 8.7 - 10.5 mg/dL    Anion Gap 5 (L) 8 - 16 mmol/L    eGFR 46.2 (A) >60 mL/min/1.73 m^2         DO Kelly JacobsPres[facility]

## 2023-02-27 NOTE — ASSESSMENT & PLAN NOTE
Per patient over 2 decades he has not slept well at bedtime. Currently taking elavil 100 mg which helps him get some rest.   Reviewed at length sleep hygiene practices to incorporate at bedtime.  - go to bed when sleepy   - no TV watching in bed  - if you do not fall asleep within 20-30 minutes at the beginning of the night or after awakening, then get out of bed  -  a thick boring book to read   - avoid any naps during the day  - take a warm bath or shower before bed and allow body temperature to drop to signal brain to prepare for sleep  - avoid caffeine, nicotine products, alcohol, large meals within two hours of bedtime

## 2023-03-01 DIAGNOSIS — E11.9 TYPE 2 DIABETES MELLITUS WITHOUT COMPLICATION: ICD-10-CM

## 2023-03-23 ENCOUNTER — LAB VISIT (OUTPATIENT)
Dept: LAB | Facility: HOSPITAL | Age: 58
End: 2023-03-23
Attending: INTERNAL MEDICINE
Payer: MEDICAID

## 2023-03-23 DIAGNOSIS — N18.32 STAGE 3B CHRONIC KIDNEY DISEASE: ICD-10-CM

## 2023-03-23 LAB
ALBUMIN SERPL BCP-MCNC: 3.6 G/DL (ref 3.5–5.2)
ANION GAP SERPL CALC-SCNC: 3 MMOL/L (ref 8–16)
BASOPHILS # BLD AUTO: 0.05 K/UL (ref 0–0.2)
BASOPHILS NFR BLD: 0.7 % (ref 0–1.9)
BUN SERPL-MCNC: 15 MG/DL (ref 6–20)
CALCIUM SERPL-MCNC: 9.6 MG/DL (ref 8.7–10.5)
CHLORIDE SERPL-SCNC: 104 MMOL/L (ref 95–110)
CO2 SERPL-SCNC: 31 MMOL/L (ref 23–29)
CREAT SERPL-MCNC: 1.4 MG/DL (ref 0.5–1.4)
CREAT UR-MCNC: 78.6 MG/DL (ref 23–375)
DIFFERENTIAL METHOD: ABNORMAL
EOSINOPHIL # BLD AUTO: 0.4 K/UL (ref 0–0.5)
EOSINOPHIL NFR BLD: 5.4 % (ref 0–8)
ERYTHROCYTE [DISTWIDTH] IN BLOOD BY AUTOMATED COUNT: 15.3 % (ref 11.5–14.5)
EST. GFR  (NO RACE VARIABLE): 57.9 ML/MIN/1.73 M^2
GLUCOSE SERPL-MCNC: 143 MG/DL (ref 70–110)
HCT VFR BLD AUTO: 42.1 % (ref 40–54)
HGB BLD-MCNC: 12.6 G/DL (ref 14–18)
IMM GRANULOCYTES # BLD AUTO: 0.02 K/UL (ref 0–0.04)
IMM GRANULOCYTES NFR BLD AUTO: 0.3 % (ref 0–0.5)
LYMPHOCYTES # BLD AUTO: 1.5 K/UL (ref 1–4.8)
LYMPHOCYTES NFR BLD: 22.5 % (ref 18–48)
MCH RBC QN AUTO: 27.2 PG (ref 27–31)
MCHC RBC AUTO-ENTMCNC: 29.9 G/DL (ref 32–36)
MCV RBC AUTO: 91 FL (ref 82–98)
MONOCYTES # BLD AUTO: 0.7 K/UL (ref 0.3–1)
MONOCYTES NFR BLD: 9.7 % (ref 4–15)
NEUTROPHILS # BLD AUTO: 4.1 K/UL (ref 1.8–7.7)
NEUTROPHILS NFR BLD: 61.4 % (ref 38–73)
NRBC BLD-RTO: 0 /100 WBC
PHOSPHATE SERPL-MCNC: 4.1 MG/DL (ref 2.7–4.5)
PLATELET # BLD AUTO: 377 K/UL (ref 150–450)
PMV BLD AUTO: 10.3 FL (ref 9.2–12.9)
POTASSIUM SERPL-SCNC: 4 MMOL/L (ref 3.5–5.1)
PROT UR-MCNC: 10.9 MG/DL (ref 0–15)
PROT/CREAT UR: 0.14 MG/G{CREAT} (ref 0–0.2)
PTH-INTACT SERPL-MCNC: 41.8 PG/ML (ref 9–77)
RBC # BLD AUTO: 4.64 M/UL (ref 4.6–6.2)
SODIUM SERPL-SCNC: 138 MMOL/L (ref 136–145)
WBC # BLD AUTO: 6.68 K/UL (ref 3.9–12.7)

## 2023-03-23 PROCEDURE — 80069 RENAL FUNCTION PANEL: CPT | Performed by: INTERNAL MEDICINE

## 2023-03-23 PROCEDURE — 83970 ASSAY OF PARATHORMONE: CPT | Performed by: INTERNAL MEDICINE

## 2023-03-23 PROCEDURE — 85025 COMPLETE CBC W/AUTO DIFF WBC: CPT | Performed by: INTERNAL MEDICINE

## 2023-03-23 PROCEDURE — 36415 COLL VENOUS BLD VENIPUNCTURE: CPT | Performed by: INTERNAL MEDICINE

## 2023-03-23 PROCEDURE — 82570 ASSAY OF URINE CREATININE: CPT | Performed by: INTERNAL MEDICINE

## 2023-03-29 DIAGNOSIS — E11.9 TYPE 2 DIABETES MELLITUS WITHOUT COMPLICATION: ICD-10-CM

## 2023-04-03 ENCOUNTER — PATIENT MESSAGE (OUTPATIENT)
Dept: ADMINISTRATIVE | Facility: HOSPITAL | Age: 58
End: 2023-04-03
Payer: MEDICAID

## 2023-04-27 ENCOUNTER — OFFICE VISIT (OUTPATIENT)
Dept: PRIMARY CARE CLINIC | Facility: CLINIC | Age: 58
End: 2023-04-27
Payer: MEDICAID

## 2023-04-27 VITALS
WEIGHT: 221 LBS | SYSTOLIC BLOOD PRESSURE: 115 MMHG | OXYGEN SATURATION: 96 % | HEIGHT: 71 IN | HEART RATE: 62 BPM | RESPIRATION RATE: 15 BRPM | DIASTOLIC BLOOD PRESSURE: 64 MMHG | TEMPERATURE: 98 F | BODY MASS INDEX: 30.94 KG/M2

## 2023-04-27 DIAGNOSIS — I10 PRIMARY HYPERTENSION: ICD-10-CM

## 2023-04-27 DIAGNOSIS — E78.2 MIXED HYPERLIPIDEMIA: ICD-10-CM

## 2023-04-27 DIAGNOSIS — G47.00 INSOMNIA, UNSPECIFIED TYPE: Primary | ICD-10-CM

## 2023-04-27 DIAGNOSIS — I73.9 PAD (PERIPHERAL ARTERY DISEASE): ICD-10-CM

## 2023-04-27 PROCEDURE — 3078F DIAST BP <80 MM HG: CPT | Mod: CPTII,,, | Performed by: STUDENT IN AN ORGANIZED HEALTH CARE EDUCATION/TRAINING PROGRAM

## 2023-04-27 PROCEDURE — 99999 PR PBB SHADOW E&M-EST. PATIENT-LVL V: ICD-10-PCS | Mod: PBBFAC,,, | Performed by: STUDENT IN AN ORGANIZED HEALTH CARE EDUCATION/TRAINING PROGRAM

## 2023-04-27 PROCEDURE — 3074F SYST BP LT 130 MM HG: CPT | Mod: CPTII,,, | Performed by: STUDENT IN AN ORGANIZED HEALTH CARE EDUCATION/TRAINING PROGRAM

## 2023-04-27 PROCEDURE — 1160F PR REVIEW ALL MEDS BY PRESCRIBER/CLIN PHARMACIST DOCUMENTED: ICD-10-PCS | Mod: CPTII,,, | Performed by: STUDENT IN AN ORGANIZED HEALTH CARE EDUCATION/TRAINING PROGRAM

## 2023-04-27 PROCEDURE — 3008F BODY MASS INDEX DOCD: CPT | Mod: CPTII,,, | Performed by: STUDENT IN AN ORGANIZED HEALTH CARE EDUCATION/TRAINING PROGRAM

## 2023-04-27 PROCEDURE — 3066F NEPHROPATHY DOC TX: CPT | Mod: CPTII,,, | Performed by: STUDENT IN AN ORGANIZED HEALTH CARE EDUCATION/TRAINING PROGRAM

## 2023-04-27 PROCEDURE — 4010F ACE/ARB THERAPY RXD/TAKEN: CPT | Mod: CPTII,,, | Performed by: STUDENT IN AN ORGANIZED HEALTH CARE EDUCATION/TRAINING PROGRAM

## 2023-04-27 PROCEDURE — 99214 PR OFFICE/OUTPT VISIT, EST, LEVL IV, 30-39 MIN: ICD-10-PCS | Mod: S$PBB,,, | Performed by: STUDENT IN AN ORGANIZED HEALTH CARE EDUCATION/TRAINING PROGRAM

## 2023-04-27 PROCEDURE — 3008F PR BODY MASS INDEX (BMI) DOCUMENTED: ICD-10-PCS | Mod: CPTII,,, | Performed by: STUDENT IN AN ORGANIZED HEALTH CARE EDUCATION/TRAINING PROGRAM

## 2023-04-27 PROCEDURE — 3078F PR MOST RECENT DIASTOLIC BLOOD PRESSURE < 80 MM HG: ICD-10-PCS | Mod: CPTII,,, | Performed by: STUDENT IN AN ORGANIZED HEALTH CARE EDUCATION/TRAINING PROGRAM

## 2023-04-27 PROCEDURE — 99215 OFFICE O/P EST HI 40 MIN: CPT | Mod: PBBFAC,PN | Performed by: STUDENT IN AN ORGANIZED HEALTH CARE EDUCATION/TRAINING PROGRAM

## 2023-04-27 PROCEDURE — 1159F MED LIST DOCD IN RCRD: CPT | Mod: CPTII,,, | Performed by: STUDENT IN AN ORGANIZED HEALTH CARE EDUCATION/TRAINING PROGRAM

## 2023-04-27 PROCEDURE — 3074F PR MOST RECENT SYSTOLIC BLOOD PRESSURE < 130 MM HG: ICD-10-PCS | Mod: CPTII,,, | Performed by: STUDENT IN AN ORGANIZED HEALTH CARE EDUCATION/TRAINING PROGRAM

## 2023-04-27 PROCEDURE — 1160F RVW MEDS BY RX/DR IN RCRD: CPT | Mod: CPTII,,, | Performed by: STUDENT IN AN ORGANIZED HEALTH CARE EDUCATION/TRAINING PROGRAM

## 2023-04-27 PROCEDURE — 4010F PR ACE/ARB THEARPY RXD/TAKEN: ICD-10-PCS | Mod: CPTII,,, | Performed by: STUDENT IN AN ORGANIZED HEALTH CARE EDUCATION/TRAINING PROGRAM

## 2023-04-27 PROCEDURE — 3066F PR DOCUMENTATION OF TREATMENT FOR NEPHROPATHY: ICD-10-PCS | Mod: CPTII,,, | Performed by: STUDENT IN AN ORGANIZED HEALTH CARE EDUCATION/TRAINING PROGRAM

## 2023-04-27 PROCEDURE — 1159F PR MEDICATION LIST DOCUMENTED IN MEDICAL RECORD: ICD-10-PCS | Mod: CPTII,,, | Performed by: STUDENT IN AN ORGANIZED HEALTH CARE EDUCATION/TRAINING PROGRAM

## 2023-04-27 PROCEDURE — 99214 OFFICE O/P EST MOD 30 MIN: CPT | Mod: S$PBB,,, | Performed by: STUDENT IN AN ORGANIZED HEALTH CARE EDUCATION/TRAINING PROGRAM

## 2023-04-27 PROCEDURE — 99999 PR PBB SHADOW E&M-EST. PATIENT-LVL V: CPT | Mod: PBBFAC,,, | Performed by: STUDENT IN AN ORGANIZED HEALTH CARE EDUCATION/TRAINING PROGRAM

## 2023-04-27 RX ORDER — ROSUVASTATIN CALCIUM 40 MG/1
40 TABLET, COATED ORAL NIGHTLY
COMMUNITY
Start: 2023-04-06 | End: 2023-05-26 | Stop reason: DRUGHIGH

## 2023-04-27 RX ORDER — TRAZODONE HYDROCHLORIDE 50 MG/1
25 TABLET ORAL NIGHTLY
Qty: 15 TABLET | Refills: 2 | Status: SHIPPED | OUTPATIENT
Start: 2023-04-27 | End: 2023-07-17 | Stop reason: SDUPTHER

## 2023-04-27 NOTE — PROGRESS NOTES
"[facility] Note    HPI:  Jayden Canada Jr. is a 58 y.o. male who presents today for Follow-up (2 month follow up for HTN)  58-year-old male with hypertension, hyperlipidemia, vitamin-D deficiency, anemia, diabetes mellitus type 2, GERD, "early" lupus, chronic kidney disease stage 3B, peripheral artery disease for follow up BP check.      Care coordination:  Cardiology, Dr. Aguayo  Endocrinology, Dr. Cortez  Neurology, Dr. Garcia  Nephrology, Dr. Yung  [facility] [place]. Dr. Sharma     22 Cologuard negative, denies blood in stool or changes in caliber, color.     Denies fever, chills, vision changes, ear, eye pain, excessive headache, chest pain, palpitations, shortness of breath, abdominal pain, nausea, vomiting, voiding or stooling problems.   ROS   A review of systems was performed and was negative except as noted above.    I personally reviewed allergies, past medical, surgical, social and family history and updated as appropriate.    Medications:    Current Outpatient Medications:     amitriptyline (ELAVIL) 100 MG tablet, Take 100 mg by mouth every evening., Disp: , Rfl:     aspirin (ECOTRIN) 81 MG EC tablet, Take 81 mg by mouth once daily. NOTIFIED DR. GAINES OFFICE, Disp: , Rfl:     baclofen (LIORESAL) 10 MG tablet, Take 10 mg by mouth 2 (two) times daily as needed., Disp: , Rfl:     carvediloL (COREG) 12.5 MG tablet, Take 12.5 mg by mouth 2 (two) times daily., Disp: , Rfl:     chlorthalidone (HYGROTEN) 25 MG Tab, Take 0.5 tablets (12.5 mg total) by mouth once daily., Disp: 15 tablet, Rfl: 2    cilostazol (PLETAL) 100 MG Tab, , Disp: , Rfl:     clopidogreL (PLAVIX) 75 mg tablet, Take 75 mg by mouth once daily. NOTIFIED DR. GAINES OFFICE, Disp: , Rfl:     dorzolamide-timolol 2-0.5% (COSOPT) 22.3-6.8 mg/mL ophthalmic solution, SMARTSI Drop(s) In Eye(s) Every 12 Hours, Disp: , Rfl:     ferrous sulfate 325 (65 FE) MG EC tablet, Take by mouth., Disp: , Rfl:     gabapentin " (NEURONTIN) 800 MG tablet, Take 400 mg by mouth 3 (three) times daily., Disp: , Rfl:     JARDIANCE 25 mg tablet, TAKE 1 TABLET BY MOUTH ONCE DAILY FOR DIABETES, Disp: , Rfl:     latanoprost 0.005 % ophthalmic solution, , Disp: , Rfl:     omeprazole (PRILOSEC) 20 MG capsule, Take 20 mg by mouth every morning., Disp: , Rfl:     rosuvastatin (CRESTOR) 40 MG Tab, Take 40 mg by mouth every evening., Disp: , Rfl:     STIOLTO RESPIMAT 2.5-2.5 mcg/actuation Mist, SMARTSI Puff(s) By Mouth Daily, Disp: , Rfl:     valsartan (DIOVAN) 160 MG tablet, Take 1 tablet (160 mg total) by mouth once daily., Disp: 30 tablet, Rfl: 2    albuterol (PROVENTIL/VENTOLIN HFA) 90 mcg/actuation inhaler, Inhale 1-2 puffs into the lungs every 6 (six) hours as needed. Rescue, Disp: 18 g, Rfl: 0    rosuvastatin (CRESTOR) 20 MG tablet, Take 20 mg by mouth., Disp: , Rfl:     traZODone (DESYREL) 50 MG tablet, Take 0.5 tablets (25 mg total) by mouth every evening., Disp: 15 tablet, Rfl: 2     Health Maintenance:  Immunization History   Administered Date(s) Administered    COVID-19, MRNA, LN-S, PF (MODERNA FULL 0.5 ML DOSE) 2021, 2021    DTP 1966, 1966, 1966, 1968, 10/28/1969, 2010    Hepatitis B, Pediatric/Adolescent 02/10/2012    IPV 1966, 1967, 1967, 10/28/1969    Influenza - Quadrivalent - PF *Preferred* (6 months and older) 10/12/2016, 2017, 10/04/2018, 10/03/2019, 10/13/2021, 2022, 2023    MMR 1966, 1967, 1971, 08/15/1973    Pneumococcal Conjugate - 13 Valent 2019    Pneumococcal Conjugate - 20 Valent 2022      Health Maintenance   Topic Date Due    Foot Exam  Never done    TETANUS VACCINE  Never done    Lipid Panel  2022    Hemoglobin A1c  2022    Eye Exam  2023 (Originally 1975)    Low Dose Statin  2024    Hepatitis C Screening  Completed     Health Maintenance Topics with due status: Not Due       Topic Last  "Completion Date    Colorectal Cancer Screening 09/12/2022    Low Dose Statin 04/27/2023     Health Maintenance Due   Topic Date Due    Diabetes Urine Screening  Never done    Foot Exam  Never done    HIV Screening  Never done    TETANUS VACCINE  Never done    Shingles Vaccine (1 of 2) Never done    COVID-19 Vaccine (3 - Booster for Moderna series) 06/01/2021    Lipid Panel  06/26/2022    Hemoglobin A1c  09/11/2022       PHYSICAL EXAM:  Vitals:    04/27/23 1110   BP: 115/64   BP Location: Right arm   Patient Position: Sitting   BP Method: Large (Automatic)   Pulse: 62   Resp: 15   Temp: 98.4 °F (36.9 °C)   TempSrc: Oral   SpO2: 96%   Weight: 100.2 kg (221 lb)   Height: 5' 11" (1.803 m)     Body mass index is 30.82 kg/m².  Physical Exam  Vitals reviewed.   Constitutional:       General: He is not in acute distress.     Appearance: Normal appearance. He is not ill-appearing or toxic-appearing.   HENT:      Head: Normocephalic and atraumatic.      Right Ear: External ear normal.      Left Ear: External ear normal.      Nose: Nose normal.      Mouth/Throat:      Mouth: Mucous membranes are moist.      Pharynx: Oropharynx is clear.   Eyes:      Extraocular Movements: Extraocular movements intact.      Conjunctiva/sclera: Conjunctivae normal.   Cardiovascular:      Rate and Rhythm: Normal rate and regular rhythm.      Pulses: Normal pulses.      Heart sounds: Normal heart sounds. No murmur heard.  Pulmonary:      Effort: Pulmonary effort is normal. No respiratory distress.      Breath sounds: Normal breath sounds. No wheezing or rales.   Abdominal:      General: Abdomen is flat. Bowel sounds are normal. There is no distension.      Palpations: Abdomen is soft. There is no mass.      Tenderness: There is no abdominal tenderness. There is no right CVA tenderness, left CVA tenderness or guarding.   Musculoskeletal:      Cervical back: Normal range of motion and neck supple. No rigidity.      Right lower leg: No edema.      " Left lower leg: No edema.   Skin:     General: Skin is warm and dry.      Findings: No lesion or rash.   Neurological:      General: No focal deficit present.      Mental Status: He is alert and oriented to person, place, and time. Mental status is at baseline.      Sensory: Sensory deficit present.      Motor: No weakness.      Gait: Gait normal.      Comments: Right hand and finger loss of sensation in ulnar distribution   Psychiatric:         Mood and Affect: Mood normal.         Behavior: Behavior normal.         Thought Content: Thought content normal.      ASSESSMENT/PLAN:  1. Insomnia, unspecified type  -     traZODone (DESYREL) 50 MG tablet; Take 0.5 tablets (25 mg total) by mouth every evening.  Dispense: 15 tablet; Refill: 2    2. Primary hypertension  Overview:  Recently stopped taking all his BP medications.   - resume daily carvedilol 12.5 mg BID  - resume valsartan 160 mg daily  - chlorthalidone 12.5 mg daily  - maintain daily adequate hydration with at least 1.5 to 2 L water     Assessment & Plan:  Normotensive. Denies symptoms. Continue with above regimen.   - follow low sodium diet <2 grams or 2 teaspoons daily  - avoid salty and processed foods from diet  - DASH diet          3. PAD (peripheral artery disease)  Assessment & Plan:  Left leg claudication. Currently taking   - cilostazol 100 mg daily  - daily aspirin, plavix, and statin  - f/u cardiology to review recent ultrasound study        4. Mixed hyperlipidemia  Assessment & Plan:  Continue with daily statin.   - rosuvastatin 20 mg daily      5. BMI 33.0-33.9,adult  Overview:  Wt Readings from Last 8 Encounters:   04/27/23 100.2 kg (221 lb)   03/28/23 102.4 kg (225 lb 12.8 oz)   02/27/23 105.2 kg (232 lb)   01/07/23 100.7 kg (222 lb)   01/06/23 101.6 kg (224 lb)   04/07/22 106.6 kg (235 lb)   03/10/22 107.7 kg (237 lb 7 oz)   03/10/22 107.7 kg (237 lb 7 oz)         Assessment & Plan:  Weight loss of approximately 15 pounds over the past year.  Continue with daily dietary modifications and incorporate daily physical exercises as tolerated.   Recommendations:   Stay physically active. As tolerated alternate resistance training with stretching and cardio. Goal of 150 minutes per week of moderate intensity activity or 7,500 - 10,000 steps per day. Follow the Mediterranean Diet. Include whole fresh fruits, vegetables, olive oil, seeds, nuts, whole grains, cold water fish, salmon, mackerel and lean cuts of meat.  Do not drink sugary/diet carbonated beverages. Decrease portion sizes slightly which will result in an approximately 500-calorie deficit. Avoid fast or fried and processed food, especially canned foods. Avoid refined carbohydrates, white starchy foods, flour, white potato, bread, muffins, and cakes. Consider substituting one meal a day with a meal replacement such as Slim fast, lean cuisine, or weight watcher's. Follow a healthy diet that includes enough calcium, vitamin D and proteins for bone health.            Other than changes above, continue current medications and maintain follow up with specialists.      Follow up in about 2 months (around 6/27/2023).   Recent Results (from the past 2016 hour(s))   Magnesium    Collection Time: 02/27/23 12:07 PM   Result Value Ref Range    Magnesium 2.3 1.6 - 2.6 mg/dL   Basic Metabolic Panel    Collection Time: 02/27/23 12:07 PM   Result Value Ref Range    Sodium 141 136 - 145 mmol/L    Potassium 4.4 3.5 - 5.1 mmol/L    Chloride 110 95 - 110 mmol/L    CO2 29 23 - 29 mmol/L    Glucose 110 70 - 110 mg/dL    BUN 9 6 - 20 mg/dL    Creatinine 1.0 0.5 - 1.4 mg/dL    Calcium 8.9 8.7 - 10.5 mg/dL    Anion Gap 2 (L) 8 - 16 mmol/L    eGFR >60.0 >60 mL/min/1.73 m^2   PTH, intact    Collection Time: 03/23/23 10:23 AM   Result Value Ref Range    PTH, Intact 41.8 9.0 - 77.0 pg/mL   Renal Function Panel    Collection Time: 03/23/23 10:23 AM   Result Value Ref Range    Glucose 143 (H) 70 - 110 mg/dL    Sodium 138 136 - 145  mmol/L    Potassium 4.0 3.5 - 5.1 mmol/L    Chloride 104 95 - 110 mmol/L    CO2 31 (H) 23 - 29 mmol/L    BUN 15 6 - 20 mg/dL    Calcium 9.6 8.7 - 10.5 mg/dL    Creatinine 1.4 0.5 - 1.4 mg/dL    Albumin 3.6 3.5 - 5.2 g/dL    Phosphorus 4.1 2.7 - 4.5 mg/dL    eGFR 57.9 (A) >60 mL/min/1.73 m^2    Anion Gap 3 (L) 8 - 16 mmol/L   CBC Auto Differential    Collection Time: 03/23/23 10:23 AM   Result Value Ref Range    WBC 6.68 3.90 - 12.70 K/uL    RBC 4.64 4.60 - 6.20 M/uL    Hemoglobin 12.6 (L) 14.0 - 18.0 g/dL    Hematocrit 42.1 40.0 - 54.0 %    MCV 91 82 - 98 fL    MCH 27.2 27.0 - 31.0 pg    MCHC 29.9 (L) 32.0 - 36.0 g/dL    RDW 15.3 (H) 11.5 - 14.5 %    Platelets 377 150 - 450 K/uL    MPV 10.3 9.2 - 12.9 fL    Immature Granulocytes 0.3 0.0 - 0.5 %    Gran # (ANC) 4.1 1.8 - 7.7 K/uL    Immature Grans (Abs) 0.02 0.00 - 0.04 K/uL    Lymph # 1.5 1.0 - 4.8 K/uL    Mono # 0.7 0.3 - 1.0 K/uL    Eos # 0.4 0.0 - 0.5 K/uL    Baso # 0.05 0.00 - 0.20 K/uL    nRBC 0 0 /100 WBC    Gran % 61.4 38.0 - 73.0 %    Lymph % 22.5 18.0 - 48.0 %    Mono % 9.7 4.0 - 15.0 %    Eosinophil % 5.4 0.0 - 8.0 %    Basophil % 0.7 0.0 - 1.9 %    Differential Method Automated    Protein/Creatinine Ratio, Urine    Collection Time: 03/23/23 10:28 AM   Result Value Ref Range    Protein, Urine Random 10.9 0.0 - 15.0 mg/dL    Creatinine, Urine 78.6 23.0 - 375.0 mg/dL    Prot/Creat Ratio, Urine 0.14 0.00 - 0.20         Marge Menjivar DO  [facility]

## 2023-04-30 PROBLEM — G47.00 INSOMNIA: Status: ACTIVE | Noted: 2023-04-30

## 2023-05-01 NOTE — PATIENT INSTRUCTIONS
Please follow below sleep hygiene practices to incorporate at bedtime.  - go to bed when sleepy   - no TV watching in bed  - if you do not fall asleep within 20-30 minutes at the beginning of the night or after awakening, then get out of bed  -  a thick boring book to read   - avoid any naps during the day  - take a warm bath or shower before bed and allow body temperature to drop to signal brain to prepare for sleep  - avoid caffeine, nicotine products, alcohol, large meals within two hours of bedtime

## 2023-05-01 NOTE — ASSESSMENT & PLAN NOTE
Weight loss of approximately 15 pounds over the past year. Continue with daily dietary modifications and incorporate daily physical exercises as tolerated.   Recommendations:   Stay physically active. As tolerated alternate resistance training with stretching and cardio. Goal of 150 minutes per week of moderate intensity activity or 7,500 - 10,000 steps per day. Follow the Mediterranean Diet. Include whole fresh fruits, vegetables, olive oil, seeds, nuts, whole grains, cold water fish, salmon, mackerel and lean cuts of meat.  Do not drink sugary/diet carbonated beverages. Decrease portion sizes slightly which will result in an approximately 500-calorie deficit. Avoid fast or fried and processed food, especially canned foods. Avoid refined carbohydrates, white starchy foods, flour, white potato, bread, muffins, and cakes. Consider substituting one meal a day with a meal replacement such as Slim fast, lean cuisine, or weight watcher's. Follow a healthy diet that includes enough calcium, vitamin D and proteins for bone health.

## 2023-05-01 NOTE — ASSESSMENT & PLAN NOTE
Normotensive. Denies symptoms. Continue with above regimen.   - follow low sodium diet <2 grams or 2 teaspoons daily  - avoid salty and processed foods from diet  - DASH diet

## 2023-05-02 LAB
LEFT EYE DM RETINOPATHY: NEGATIVE
RIGHT EYE DM RETINOPATHY: NEGATIVE

## 2023-05-09 ENCOUNTER — PATIENT OUTREACH (OUTPATIENT)
Dept: ADMINISTRATIVE | Facility: HOSPITAL | Age: 58
End: 2023-05-09
Payer: MEDICAID

## 2023-05-17 ENCOUNTER — PATIENT OUTREACH (OUTPATIENT)
Dept: ADMINISTRATIVE | Facility: HOSPITAL | Age: 58
End: 2023-05-17
Payer: MEDICAID

## 2023-05-23 DIAGNOSIS — I10 PRIMARY HYPERTENSION: ICD-10-CM

## 2023-05-23 RX ORDER — VALSARTAN 160 MG/1
TABLET ORAL
Qty: 30 TABLET | Refills: 0 | Status: SHIPPED | OUTPATIENT
Start: 2023-05-23 | End: 2023-06-29

## 2023-05-23 RX ORDER — CHLORTHALIDONE 25 MG/1
TABLET ORAL
Qty: 15 TABLET | Refills: 0 | Status: SHIPPED | OUTPATIENT
Start: 2023-05-23 | End: 2023-07-17 | Stop reason: SDUPTHER

## 2023-05-26 ENCOUNTER — LAB VISIT (OUTPATIENT)
Dept: LAB | Facility: HOSPITAL | Age: 58
End: 2023-05-26
Attending: STUDENT IN AN ORGANIZED HEALTH CARE EDUCATION/TRAINING PROGRAM
Payer: MEDICAID

## 2023-05-26 ENCOUNTER — OFFICE VISIT (OUTPATIENT)
Dept: PRIMARY CARE CLINIC | Facility: CLINIC | Age: 58
End: 2023-05-26
Payer: MEDICAID

## 2023-05-26 VITALS
SYSTOLIC BLOOD PRESSURE: 132 MMHG | HEIGHT: 71 IN | TEMPERATURE: 99 F | RESPIRATION RATE: 16 BRPM | DIASTOLIC BLOOD PRESSURE: 70 MMHG | WEIGHT: 223 LBS | BODY MASS INDEX: 31.22 KG/M2 | HEART RATE: 85 BPM | OXYGEN SATURATION: 96 %

## 2023-05-26 DIAGNOSIS — Z79.4 TYPE 2 DIABETES MELLITUS WITHOUT COMPLICATION, WITH LONG-TERM CURRENT USE OF INSULIN: ICD-10-CM

## 2023-05-26 DIAGNOSIS — E78.2 MIXED HYPERLIPIDEMIA: ICD-10-CM

## 2023-05-26 DIAGNOSIS — E11.9 TYPE 2 DIABETES MELLITUS WITHOUT COMPLICATION, WITH LONG-TERM CURRENT USE OF INSULIN: ICD-10-CM

## 2023-05-26 DIAGNOSIS — R78.81 BACTEREMIA: Primary | ICD-10-CM

## 2023-05-26 DIAGNOSIS — R78.81 BACTEREMIA: ICD-10-CM

## 2023-05-26 DIAGNOSIS — I73.9 BILATERAL CLAUDICATION OF LOWER LIMB: ICD-10-CM

## 2023-05-26 DIAGNOSIS — I73.9 PAD (PERIPHERAL ARTERY DISEASE): ICD-10-CM

## 2023-05-26 DIAGNOSIS — G47.9 SLEEPING DIFFICULTIES: ICD-10-CM

## 2023-05-26 DIAGNOSIS — Z12.11 COLON CANCER SCREENING: ICD-10-CM

## 2023-05-26 DIAGNOSIS — I10 PRIMARY HYPERTENSION: ICD-10-CM

## 2023-05-26 LAB
ALBUMIN SERPL BCP-MCNC: 3.4 G/DL (ref 3.5–5.2)
ALP SERPL-CCNC: 110 U/L (ref 55–135)
ALT SERPL W/O P-5'-P-CCNC: 19 U/L (ref 10–44)
ANION GAP SERPL CALC-SCNC: 4 MMOL/L (ref 8–16)
AST SERPL-CCNC: 10 U/L (ref 10–40)
BACTERIA #/AREA URNS HPF: NEGATIVE /HPF
BASOPHILS # BLD AUTO: 0.03 K/UL (ref 0–0.2)
BASOPHILS NFR BLD: 0.5 % (ref 0–1.9)
BILIRUB SERPL-MCNC: 0.4 MG/DL (ref 0.1–1)
BILIRUB UR QL STRIP: NEGATIVE
BUN SERPL-MCNC: 16 MG/DL (ref 6–20)
CALCIUM SERPL-MCNC: 9.7 MG/DL (ref 8.7–10.5)
CHLORIDE SERPL-SCNC: 105 MMOL/L (ref 95–110)
CLARITY UR: CLEAR
CO2 SERPL-SCNC: 31 MMOL/L (ref 23–29)
COLOR UR: YELLOW
CREAT SERPL-MCNC: 1.3 MG/DL (ref 0.5–1.4)
CRP SERPL-MCNC: 1.6 MG/DL (ref 0–0.75)
DIFFERENTIAL METHOD: ABNORMAL
EOSINOPHIL # BLD AUTO: 0.3 K/UL (ref 0–0.5)
EOSINOPHIL NFR BLD: 4.3 % (ref 0–8)
ERYTHROCYTE [DISTWIDTH] IN BLOOD BY AUTOMATED COUNT: 14.8 % (ref 11.5–14.5)
ERYTHROCYTE [SEDIMENTATION RATE] IN BLOOD: 27 MM/HR (ref 0–10)
EST. GFR  (NO RACE VARIABLE): >60 ML/MIN/1.73 M^2
GLUCOSE SERPL-MCNC: 143 MG/DL (ref 70–110)
GLUCOSE UR QL STRIP: ABNORMAL
HCT VFR BLD AUTO: 43.5 % (ref 40–54)
HGB BLD-MCNC: 13.5 G/DL (ref 14–18)
HGB UR QL STRIP: NEGATIVE
HYALINE CASTS #/AREA URNS LPF: 0.4 /LPF
IMM GRANULOCYTES # BLD AUTO: 0.02 K/UL (ref 0–0.04)
IMM GRANULOCYTES NFR BLD AUTO: 0.3 % (ref 0–0.5)
KETONES UR QL STRIP: NEGATIVE
LEUKOCYTE ESTERASE UR QL STRIP: NEGATIVE
LYMPHOCYTES # BLD AUTO: 1.6 K/UL (ref 1–4.8)
LYMPHOCYTES NFR BLD: 25 % (ref 18–48)
MCH RBC QN AUTO: 27.9 PG (ref 27–31)
MCHC RBC AUTO-ENTMCNC: 31 G/DL (ref 32–36)
MCV RBC AUTO: 90 FL (ref 82–98)
MICROSCOPIC COMMENT: ABNORMAL
MONOCYTES # BLD AUTO: 0.5 K/UL (ref 0.3–1)
MONOCYTES NFR BLD: 8.1 % (ref 4–15)
NEUTROPHILS # BLD AUTO: 3.9 K/UL (ref 1.8–7.7)
NEUTROPHILS NFR BLD: 61.8 % (ref 38–73)
NITRITE UR QL STRIP: NEGATIVE
NRBC BLD-RTO: 0 /100 WBC
PH UR STRIP: 6 [PH] (ref 5–8)
PLATELET # BLD AUTO: 332 K/UL (ref 150–450)
PMV BLD AUTO: 10.1 FL (ref 9.2–12.9)
POTASSIUM SERPL-SCNC: 3.8 MMOL/L (ref 3.5–5.1)
PROT SERPL-MCNC: 8.5 G/DL (ref 6–8.4)
PROT UR QL STRIP: NEGATIVE
RBC # BLD AUTO: 4.84 M/UL (ref 4.6–6.2)
RBC #/AREA URNS HPF: 0 /HPF (ref 0–4)
SODIUM SERPL-SCNC: 140 MMOL/L (ref 136–145)
SP GR UR STRIP: >=1.03 (ref 1–1.03)
SQUAMOUS #/AREA URNS HPF: 1 /HPF
URN SPEC COLLECT METH UR: ABNORMAL
UROBILINOGEN UR STRIP-ACNC: NEGATIVE EU/DL
WBC # BLD AUTO: 6.28 K/UL (ref 3.9–12.7)
WBC #/AREA URNS HPF: 1 /HPF (ref 0–5)
YEAST URNS QL MICRO: ABNORMAL

## 2023-05-26 PROCEDURE — 3078F DIAST BP <80 MM HG: CPT | Mod: CPTII,,, | Performed by: STUDENT IN AN ORGANIZED HEALTH CARE EDUCATION/TRAINING PROGRAM

## 2023-05-26 PROCEDURE — 3066F PR DOCUMENTATION OF TREATMENT FOR NEPHROPATHY: ICD-10-PCS | Mod: CPTII,,, | Performed by: STUDENT IN AN ORGANIZED HEALTH CARE EDUCATION/TRAINING PROGRAM

## 2023-05-26 PROCEDURE — 87040 BLOOD CULTURE FOR BACTERIA: CPT | Mod: 59 | Performed by: STUDENT IN AN ORGANIZED HEALTH CARE EDUCATION/TRAINING PROGRAM

## 2023-05-26 PROCEDURE — 3066F NEPHROPATHY DOC TX: CPT | Mod: CPTII,,, | Performed by: STUDENT IN AN ORGANIZED HEALTH CARE EDUCATION/TRAINING PROGRAM

## 2023-05-26 PROCEDURE — 3075F SYST BP GE 130 - 139MM HG: CPT | Mod: CPTII,,, | Performed by: STUDENT IN AN ORGANIZED HEALTH CARE EDUCATION/TRAINING PROGRAM

## 2023-05-26 PROCEDURE — 3008F PR BODY MASS INDEX (BMI) DOCUMENTED: ICD-10-PCS | Mod: CPTII,,, | Performed by: STUDENT IN AN ORGANIZED HEALTH CARE EDUCATION/TRAINING PROGRAM

## 2023-05-26 PROCEDURE — 4010F ACE/ARB THERAPY RXD/TAKEN: CPT | Mod: CPTII,,, | Performed by: STUDENT IN AN ORGANIZED HEALTH CARE EDUCATION/TRAINING PROGRAM

## 2023-05-26 PROCEDURE — 85651 RBC SED RATE NONAUTOMATED: CPT | Performed by: STUDENT IN AN ORGANIZED HEALTH CARE EDUCATION/TRAINING PROGRAM

## 2023-05-26 PROCEDURE — 80053 COMPREHEN METABOLIC PANEL: CPT | Performed by: STUDENT IN AN ORGANIZED HEALTH CARE EDUCATION/TRAINING PROGRAM

## 2023-05-26 PROCEDURE — 99999 PR PBB SHADOW E&M-EST. PATIENT-LVL V: CPT | Mod: PBBFAC,,, | Performed by: STUDENT IN AN ORGANIZED HEALTH CARE EDUCATION/TRAINING PROGRAM

## 2023-05-26 PROCEDURE — 3075F PR MOST RECENT SYSTOLIC BLOOD PRESS GE 130-139MM HG: ICD-10-PCS | Mod: CPTII,,, | Performed by: STUDENT IN AN ORGANIZED HEALTH CARE EDUCATION/TRAINING PROGRAM

## 2023-05-26 PROCEDURE — 85025 COMPLETE CBC W/AUTO DIFF WBC: CPT | Performed by: STUDENT IN AN ORGANIZED HEALTH CARE EDUCATION/TRAINING PROGRAM

## 2023-05-26 PROCEDURE — 1160F RVW MEDS BY RX/DR IN RCRD: CPT | Mod: CPTII,,, | Performed by: STUDENT IN AN ORGANIZED HEALTH CARE EDUCATION/TRAINING PROGRAM

## 2023-05-26 PROCEDURE — 36415 COLL VENOUS BLD VENIPUNCTURE: CPT | Performed by: STUDENT IN AN ORGANIZED HEALTH CARE EDUCATION/TRAINING PROGRAM

## 2023-05-26 PROCEDURE — 3008F BODY MASS INDEX DOCD: CPT | Mod: CPTII,,, | Performed by: STUDENT IN AN ORGANIZED HEALTH CARE EDUCATION/TRAINING PROGRAM

## 2023-05-26 PROCEDURE — 99215 OFFICE O/P EST HI 40 MIN: CPT | Mod: PBBFAC,PN | Performed by: STUDENT IN AN ORGANIZED HEALTH CARE EDUCATION/TRAINING PROGRAM

## 2023-05-26 PROCEDURE — 1159F MED LIST DOCD IN RCRD: CPT | Mod: CPTII,,, | Performed by: STUDENT IN AN ORGANIZED HEALTH CARE EDUCATION/TRAINING PROGRAM

## 2023-05-26 PROCEDURE — 3044F PR MOST RECENT HEMOGLOBIN A1C LEVEL <7.0%: ICD-10-PCS | Mod: CPTII,,, | Performed by: STUDENT IN AN ORGANIZED HEALTH CARE EDUCATION/TRAINING PROGRAM

## 2023-05-26 PROCEDURE — 99999 PR PBB SHADOW E&M-EST. PATIENT-LVL V: ICD-10-PCS | Mod: PBBFAC,,, | Performed by: STUDENT IN AN ORGANIZED HEALTH CARE EDUCATION/TRAINING PROGRAM

## 2023-05-26 PROCEDURE — 3044F HG A1C LEVEL LT 7.0%: CPT | Mod: CPTII,,, | Performed by: STUDENT IN AN ORGANIZED HEALTH CARE EDUCATION/TRAINING PROGRAM

## 2023-05-26 PROCEDURE — 99214 PR OFFICE/OUTPT VISIT, EST, LEVL IV, 30-39 MIN: ICD-10-PCS | Mod: S$PBB,,, | Performed by: STUDENT IN AN ORGANIZED HEALTH CARE EDUCATION/TRAINING PROGRAM

## 2023-05-26 PROCEDURE — 81000 URINALYSIS NONAUTO W/SCOPE: CPT | Performed by: STUDENT IN AN ORGANIZED HEALTH CARE EDUCATION/TRAINING PROGRAM

## 2023-05-26 PROCEDURE — 99214 OFFICE O/P EST MOD 30 MIN: CPT | Mod: S$PBB,,, | Performed by: STUDENT IN AN ORGANIZED HEALTH CARE EDUCATION/TRAINING PROGRAM

## 2023-05-26 PROCEDURE — 4010F PR ACE/ARB THEARPY RXD/TAKEN: ICD-10-PCS | Mod: CPTII,,, | Performed by: STUDENT IN AN ORGANIZED HEALTH CARE EDUCATION/TRAINING PROGRAM

## 2023-05-26 PROCEDURE — 3078F PR MOST RECENT DIASTOLIC BLOOD PRESSURE < 80 MM HG: ICD-10-PCS | Mod: CPTII,,, | Performed by: STUDENT IN AN ORGANIZED HEALTH CARE EDUCATION/TRAINING PROGRAM

## 2023-05-26 PROCEDURE — 1159F PR MEDICATION LIST DOCUMENTED IN MEDICAL RECORD: ICD-10-PCS | Mod: CPTII,,, | Performed by: STUDENT IN AN ORGANIZED HEALTH CARE EDUCATION/TRAINING PROGRAM

## 2023-05-26 PROCEDURE — 86140 C-REACTIVE PROTEIN: CPT | Performed by: STUDENT IN AN ORGANIZED HEALTH CARE EDUCATION/TRAINING PROGRAM

## 2023-05-26 PROCEDURE — 1160F PR REVIEW ALL MEDS BY PRESCRIBER/CLIN PHARMACIST DOCUMENTED: ICD-10-PCS | Mod: CPTII,,, | Performed by: STUDENT IN AN ORGANIZED HEALTH CARE EDUCATION/TRAINING PROGRAM

## 2023-05-26 RX ORDER — ROSUVASTATIN CALCIUM 40 MG/1
40 TABLET, COATED ORAL NIGHTLY
Qty: 90 TABLET | Refills: 3 | Status: SHIPPED | OUTPATIENT
Start: 2023-05-26 | End: 2024-05-25

## 2023-05-26 NOTE — PROGRESS NOTES
"[facility] Note    HPI:  Jayden Canada Jr. is a 58 y.o. male who presents today for Follow-up (He states he is sleeping a tiny bit better)    58-year-old male with hypertension, hyperlipidemia, vitamin-D deficiency, anemia, diabetes mellitus type 2, GERD, "early" lupus, chronic kidney disease stage 3B, peripheral artery disease for follow up BP check.   As a platelet donor, he was told recently that bacteria (?) was growing in his blood.   Patient denies any lesions and skin break over body and recent contact with sick.    Denies fever, chills, vision changes, dizziness, ear, eye pain, excessive headache, chest pain, palpitations, shortness of breath, abdominal pain, nausea, vomiting, voiding or stooling problems.      Care coordination:  Cardiology, Dr. Aguayo  Endocrinology, Dr. Cortez  Neurology, Dr. Garcia  Nephrology, Dr. Yung  [facility]. Dr. Sharma       ROS   A review of systems was performed and was negative except as noted above.    I personally reviewed allergies, past medical, surgical, social and family history and updated as appropriate.    Medications:    Current Outpatient Medications:     amitriptyline (ELAVIL) 100 MG tablet, Take 100 mg by mouth every evening., Disp: , Rfl:     aspirin (ECOTRIN) 81 MG EC tablet, Take 81 mg by mouth once daily. NOTIFIED DR. GAINES OFFICE, Disp: , Rfl:     baclofen (LIORESAL) 10 MG tablet, Take 10 mg by mouth 2 (two) times daily as needed., Disp: , Rfl:     carvediloL (COREG) 12.5 MG tablet, Take 12.5 mg by mouth 2 (two) times daily., Disp: , Rfl:     chlorthalidone (HYGROTEN) 25 MG Tab, Take 1/2 (one-half) tablet by mouth once daily, Disp: 15 tablet, Rfl: 0    cilostazol (PLETAL) 100 MG Tab, , Disp: , Rfl:     clopidogreL (PLAVIX) 75 mg tablet, Take 75 mg by mouth once daily. NOTIFIED DR. GAINES OFFICE, Disp: , Rfl:     dorzolamide-timolol 2-0.5% (COSOPT) 22.3-6.8 mg/mL ophthalmic solution, SMARTSI Drop(s) In Eye(s) Every 12 " Hours, Disp: , Rfl:     ferrous sulfate 325 (65 FE) MG EC tablet, Take by mouth., Disp: , Rfl:     gabapentin (NEURONTIN) 800 MG tablet, Take 400 mg by mouth 3 (three) times daily., Disp: , Rfl:     JARDIANCE 25 mg tablet, TAKE 1 TABLET BY MOUTH ONCE DAILY FOR DIABETES, Disp: , Rfl:     latanoprost 0.005 % ophthalmic solution, , Disp: , Rfl:     omeprazole (PRILOSEC) 20 MG capsule, Take 20 mg by mouth every morning., Disp: , Rfl:     STIOLTO RESPIMAT 2.5-2.5 mcg/actuation Mist, SMARTSI Puff(s) By Mouth Daily, Disp: , Rfl:     traZODone (DESYREL) 50 MG tablet, Take 0.5 tablets (25 mg total) by mouth every evening., Disp: 15 tablet, Rfl: 2    valsartan (DIOVAN) 160 MG tablet, Take 1 tablet by mouth once daily, Disp: 30 tablet, Rfl: 0    albuterol (PROVENTIL/VENTOLIN HFA) 90 mcg/actuation inhaler, Inhale 1-2 puffs into the lungs every 6 (six) hours as needed. Rescue, Disp: 18 g, Rfl: 0    rosuvastatin (CRESTOR) 40 MG Tab, Take 1 tablet (40 mg total) by mouth every evening., Disp: 90 tablet, Rfl: 3     Health Maintenance:  Immunization History   Administered Date(s) Administered    COVID-19, MRNA, LN-S, PF (MODERNA FULL 0.5 ML DOSE) 2021, 2021    DTP 1966, 1966, 1966, 1968, 10/28/1969, 2010    Hepatitis B, Pediatric/Adolescent 02/10/2012    IPV 1966, 1967, 1967, 10/28/1969    Influenza - Quadrivalent - PF *Preferred* (6 months and older) 10/12/2016, 2017, 10/04/2018, 10/03/2019, 10/13/2021, 2022, 2023    Influenza - Trivalent (ADULT) 2020    Influenza - Trivalent - PF (ADULT) 2022, 2023    MMR 1966, 1967, 1971, 08/15/1973    Pneumococcal Conjugate - 13 Valent 2019    Pneumococcal Conjugate - 20 Valent 2022    Pneumococcal Polysaccharide - 23 Valent 10/26/2018      Health Maintenance   Topic Date Due    Foot Exam  Never done    TETANUS VACCINE  Never done    Lipid Panel  2022     "Hemoglobin A1c  08/20/2023    Eye Exam  05/02/2024    High Dose Statin  05/26/2024    Hepatitis C Screening  Completed     Health Maintenance Topics with due status: Not Due       Topic Last Completion Date    Colorectal Cancer Screening 09/12/2022    Hemoglobin A1c 02/20/2023    Eye Exam 05/02/2023    High Dose Statin 05/26/2023     Health Maintenance Due   Topic Date Due    Diabetes Urine Screening  Never done    Foot Exam  Never done    HIV Screening  Never done    TETANUS VACCINE  Never done    Shingles Vaccine (1 of 2) Never done    COVID-19 Vaccine (3 - Moderna series) 06/01/2021    Lipid Panel  06/26/2022       PHYSICAL EXAM:  Vitals:    05/26/23 1401   BP: 132/70   BP Location: Left arm   Patient Position: Sitting   BP Method: Large (Automatic)   Pulse: 85   Resp: 16   Temp: 98.5 °F (36.9 °C)   TempSrc: Oral   SpO2: 96%   Weight: 101.2 kg (223 lb)   Height: 5' 11" (1.803 m)     Body mass index is 31.1 kg/m².  Physical Exam  Vitals reviewed.   Constitutional:       General: He is not in acute distress.     Appearance: Normal appearance. He is not ill-appearing or toxic-appearing.   HENT:      Head: Normocephalic and atraumatic.      Right Ear: External ear normal.      Left Ear: External ear normal.      Nose: Nose normal.      Mouth/Throat:      Mouth: Mucous membranes are moist.      Pharynx: Oropharynx is clear.   Eyes:      Extraocular Movements: Extraocular movements intact.      Conjunctiva/sclera: Conjunctivae normal.   Cardiovascular:      Rate and Rhythm: Normal rate and regular rhythm.      Pulses: Normal pulses.      Heart sounds: Normal heart sounds. No murmur heard.  Pulmonary:      Effort: Pulmonary effort is normal. No respiratory distress.      Breath sounds: Normal breath sounds. No wheezing or rales.   Abdominal:      General: Abdomen is flat. Bowel sounds are normal. There is no distension.      Palpations: Abdomen is soft. There is no mass.      Tenderness: There is no abdominal " "tenderness. There is no right CVA tenderness, left CVA tenderness or guarding.   Musculoskeletal:      Cervical back: Normal range of motion and neck supple. No rigidity.      Right lower leg: No edema.      Left lower leg: No edema.   Skin:     General: Skin is warm and dry.      Findings: No lesion or rash.   Neurological:      General: No focal deficit present.      Mental Status: He is alert and oriented to person, place, and time. Mental status is at baseline.      Cranial Nerves: No cranial nerve deficit.      Motor: No weakness.      Gait: Gait normal.      Comments: Right hand and finger loss of sensation in ulnar distribution   Psychiatric:         Mood and Affect: Mood normal.         Behavior: Behavior normal.         Thought Content: Thought content normal.      ASSESSMENT/PLAN:  1. Bacteremia  Assessment & Plan:  Per patient was informed to follow up after findings of "bacteria in blood at donor center for platelets. Patient otherwise asymptomatic. No infectious process seen at this time.   - f/u blood cultures x2  - f/u urine studies      Orders:  -     CBC Auto Differential; Future; Expected date: 05/26/2023  -     Comprehensive Metabolic Panel; Future; Expected date: 05/26/2023  -     Blood culture; Future; Expected date: 05/26/2023  -     Blood culture; Future; Expected date: 05/26/2023  -     Sedimentation rate; Future; Expected date: 05/26/2023  -     C-Reactive Protein; Future; Expected date: 05/26/2023  -     Urinalysis, Reflex to Urine Culture Urine, Clean Catch; Future; Expected date: 05/26/2023    2. Mixed hyperlipidemia  -     rosuvastatin (CRESTOR) 40 MG Tab; Take 1 tablet (40 mg total) by mouth every evening.  Dispense: 90 tablet; Refill: 3    3. Bilateral claudication of lower limb  -     US Lower Extrem Arteries Bilat with SAM (xpd); Future; Expected date: 05/26/2023    4. Colon cancer screening  Assessment & Plan:  9/12/22 Cologuard negative, denies blood in stool or changes in caliber, " color.        5. Primary hypertension  Overview:  Recently stopped taking all his BP medications.   - resume daily carvedilol 12.5 mg BID  - resume valsartan 160 mg daily  - chlorthalidone 12.5 mg daily  - maintain daily adequate hydration with at least 1.5 to 2 L water       6. PAD (peripheral artery disease)    7. Sleeping difficulties  Assessment & Plan:  Per patient over 2 decades he has not slept well at bedtime. Currently taking elavil 100 mg which helps him get some rest.   Reviewed at length sleep hygiene practices to incorporate at bedtime.  - go to bed when sleepy   - no TV watching in bed  - if you do not fall asleep within 20-30 minutes at the beginning of the night or after awakening, then get out of bed  -  a thick boring book to read   - avoid any naps during the day  - take a warm bath or shower before bed and allow body temperature to drop to signal brain to prepare for sleep  - avoid caffeine, nicotine products, alcohol, large meals within two hours of bedtime             8. Type 2 diabetes mellitus without complication, with long-term current use of insulin    Other orders  -     Urinalysis Microscopic        Other than changes above, continue current medications and maintain follow up with specialists.      Follow up in about 2 weeks (around 6/9/2023) for Lab review, Med recheck.   Recent Results (from the past 2016 hour(s))    DIABETES EYE EXAM    Collection Time: 05/02/23 12:00 AM   Result Value Ref Range    Left Eye DM Retinopathy Negative     Right Eye DM Retinopathy Negative    Urinalysis, Reflex to Urine Culture Urine, Clean Catch    Collection Time: 05/26/23  3:15 PM    Specimen: Urine   Result Value Ref Range    Specimen UA Urine, Clean Catch     Color, UA Yellow Yellow, Straw, Shabnam    Appearance, UA Clear Clear    pH, UA 6.0 5.0 - 8.0    Specific Gravity, UA >=1.030 (A) 1.005 - 1.030    Protein, UA Negative Negative    Glucose, UA 4+ (A) Negative    Ketones, UA Negative  Negative    Bilirubin (UA) Negative Negative    Occult Blood UA Negative Negative    Nitrite, UA Negative Negative    Urobilinogen, UA Negative <2.0 EU/dL    Leukocytes, UA Negative Negative   Urinalysis Microscopic    Collection Time: 05/26/23  3:15 PM   Result Value Ref Range    RBC, UA 0 0 - 4 /hpf    WBC, UA 1 0 - 5 /hpf    Bacteria Negative None-Occ /hpf    Yeast, UA None None    Squam Epithel, UA 1 /hpf    Hyaline Casts, UA 0.4 (A) 0-1/lpf /lpf    Microscopic Comment SEE COMMENT    CBC Auto Differential    Collection Time: 05/26/23  3:42 PM   Result Value Ref Range    WBC 6.28 3.90 - 12.70 K/uL    RBC 4.84 4.60 - 6.20 M/uL    Hemoglobin 13.5 (L) 14.0 - 18.0 g/dL    Hematocrit 43.5 40.0 - 54.0 %    MCV 90 82 - 98 fL    MCH 27.9 27.0 - 31.0 pg    MCHC 31.0 (L) 32.0 - 36.0 g/dL    RDW 14.8 (H) 11.5 - 14.5 %    Platelets 332 150 - 450 K/uL    MPV 10.1 9.2 - 12.9 fL    Immature Granulocytes 0.3 0.0 - 0.5 %    Gran # (ANC) 3.9 1.8 - 7.7 K/uL    Immature Grans (Abs) 0.02 0.00 - 0.04 K/uL    Lymph # 1.6 1.0 - 4.8 K/uL    Mono # 0.5 0.3 - 1.0 K/uL    Eos # 0.3 0.0 - 0.5 K/uL    Baso # 0.03 0.00 - 0.20 K/uL    nRBC 0 0 /100 WBC    Gran % 61.8 38.0 - 73.0 %    Lymph % 25.0 18.0 - 48.0 %    Mono % 8.1 4.0 - 15.0 %    Eosinophil % 4.3 0.0 - 8.0 %    Basophil % 0.5 0.0 - 1.9 %    Differential Method Automated    Comprehensive Metabolic Panel    Collection Time: 05/26/23  3:42 PM   Result Value Ref Range    Sodium 140 136 - 145 mmol/L    Potassium 3.8 3.5 - 5.1 mmol/L    Chloride 105 95 - 110 mmol/L    CO2 31 (H) 23 - 29 mmol/L    Glucose 143 (H) 70 - 110 mg/dL    BUN 16 6 - 20 mg/dL    Creatinine 1.3 0.5 - 1.4 mg/dL    Calcium 9.7 8.7 - 10.5 mg/dL    Total Protein 8.5 (H) 6.0 - 8.4 g/dL    Albumin 3.4 (L) 3.5 - 5.2 g/dL    Total Bilirubin 0.4 0.1 - 1.0 mg/dL    Alkaline Phosphatase 110 55 - 135 U/L    AST 10 10 - 40 U/L    ALT 19 10 - 44 U/L    Anion Gap 4 (L) 8 - 16 mmol/L    eGFR >60.0 >60 mL/min/1.73 m^2   Blood culture     Collection Time: 05/26/23  3:42 PM    Specimen: Antecubital, Right; Blood   Result Value Ref Range    Blood Culture, Routine No growth after 5 days.    Sedimentation rate    Collection Time: 05/26/23  3:42 PM   Result Value Ref Range    Sed Rate 27 (H) 0 - 10 mm/Hr   C-Reactive Protein    Collection Time: 05/26/23  3:42 PM   Result Value Ref Range    CRP 1.60 (H) 0.00 - 0.75 mg/dL   Blood culture    Collection Time: 05/26/23  3:47 PM    Specimen: Antecubital, Left; Blood   Result Value Ref Range    Blood Culture, Routine No growth after 5 days.          Marge Menjivar, DO [facility]

## 2023-05-31 LAB
BACTERIA BLD CULT: NORMAL
BACTERIA BLD CULT: NORMAL

## 2023-06-15 PROBLEM — Z12.11 COLON CANCER SCREENING: Status: ACTIVE | Noted: 2023-06-15

## 2023-06-15 PROBLEM — I73.9 BILATERAL CLAUDICATION OF LOWER LIMB: Status: ACTIVE | Noted: 2023-06-15

## 2023-06-15 PROBLEM — R78.81 BACTEREMIA: Status: ACTIVE | Noted: 2023-06-15

## 2023-06-16 NOTE — ASSESSMENT & PLAN NOTE
"Per patient was informed to follow up after findings of "bacteria in blood at donor center for platelets. Patient otherwise asymptomatic. No infectious process seen at this time.   - f/u blood cultures x2  - f/u urine studies    "

## 2023-06-21 ENCOUNTER — OFFICE VISIT (OUTPATIENT)
Dept: PRIMARY CARE CLINIC | Facility: CLINIC | Age: 58
End: 2023-06-21
Payer: MEDICAID

## 2023-06-21 VITALS
OXYGEN SATURATION: 97 % | TEMPERATURE: 99 F | DIASTOLIC BLOOD PRESSURE: 75 MMHG | HEIGHT: 71 IN | HEART RATE: 78 BPM | WEIGHT: 217 LBS | RESPIRATION RATE: 16 BRPM | BODY MASS INDEX: 30.38 KG/M2 | SYSTOLIC BLOOD PRESSURE: 128 MMHG

## 2023-06-21 DIAGNOSIS — Z79.4 TYPE 2 DIABETES MELLITUS WITHOUT COMPLICATION, WITH LONG-TERM CURRENT USE OF INSULIN: ICD-10-CM

## 2023-06-21 DIAGNOSIS — E11.9 TYPE 2 DIABETES MELLITUS WITHOUT COMPLICATION, WITH LONG-TERM CURRENT USE OF INSULIN: ICD-10-CM

## 2023-06-21 DIAGNOSIS — R78.81 BACTEREMIA: Primary | ICD-10-CM

## 2023-06-21 DIAGNOSIS — E66.9 CLASS 1 OBESITY WITH SERIOUS COMORBIDITY AND BODY MASS INDEX (BMI) OF 30.0 TO 30.9 IN ADULT, UNSPECIFIED OBESITY TYPE: ICD-10-CM

## 2023-06-21 DIAGNOSIS — K21.9 GASTROESOPHAGEAL REFLUX DISEASE, UNSPECIFIED WHETHER ESOPHAGITIS PRESENT: ICD-10-CM

## 2023-06-21 DIAGNOSIS — I10 PRIMARY HYPERTENSION: ICD-10-CM

## 2023-06-21 PROCEDURE — 3066F PR DOCUMENTATION OF TREATMENT FOR NEPHROPATHY: ICD-10-PCS | Mod: CPTII,,, | Performed by: STUDENT IN AN ORGANIZED HEALTH CARE EDUCATION/TRAINING PROGRAM

## 2023-06-21 PROCEDURE — 4010F ACE/ARB THERAPY RXD/TAKEN: CPT | Mod: CPTII,,, | Performed by: STUDENT IN AN ORGANIZED HEALTH CARE EDUCATION/TRAINING PROGRAM

## 2023-06-21 PROCEDURE — 1160F PR REVIEW ALL MEDS BY PRESCRIBER/CLIN PHARMACIST DOCUMENTED: ICD-10-PCS | Mod: CPTII,,, | Performed by: STUDENT IN AN ORGANIZED HEALTH CARE EDUCATION/TRAINING PROGRAM

## 2023-06-21 PROCEDURE — 3044F HG A1C LEVEL LT 7.0%: CPT | Mod: CPTII,,, | Performed by: STUDENT IN AN ORGANIZED HEALTH CARE EDUCATION/TRAINING PROGRAM

## 2023-06-21 PROCEDURE — 4010F PR ACE/ARB THEARPY RXD/TAKEN: ICD-10-PCS | Mod: CPTII,,, | Performed by: STUDENT IN AN ORGANIZED HEALTH CARE EDUCATION/TRAINING PROGRAM

## 2023-06-21 PROCEDURE — 3008F PR BODY MASS INDEX (BMI) DOCUMENTED: ICD-10-PCS | Mod: CPTII,,, | Performed by: STUDENT IN AN ORGANIZED HEALTH CARE EDUCATION/TRAINING PROGRAM

## 2023-06-21 PROCEDURE — 99215 OFFICE O/P EST HI 40 MIN: CPT | Mod: PBBFAC,PN | Performed by: STUDENT IN AN ORGANIZED HEALTH CARE EDUCATION/TRAINING PROGRAM

## 2023-06-21 PROCEDURE — 1159F MED LIST DOCD IN RCRD: CPT | Mod: CPTII,,, | Performed by: STUDENT IN AN ORGANIZED HEALTH CARE EDUCATION/TRAINING PROGRAM

## 2023-06-21 PROCEDURE — 3078F DIAST BP <80 MM HG: CPT | Mod: CPTII,,, | Performed by: STUDENT IN AN ORGANIZED HEALTH CARE EDUCATION/TRAINING PROGRAM

## 2023-06-21 PROCEDURE — 3008F BODY MASS INDEX DOCD: CPT | Mod: CPTII,,, | Performed by: STUDENT IN AN ORGANIZED HEALTH CARE EDUCATION/TRAINING PROGRAM

## 2023-06-21 PROCEDURE — 1159F PR MEDICATION LIST DOCUMENTED IN MEDICAL RECORD: ICD-10-PCS | Mod: CPTII,,, | Performed by: STUDENT IN AN ORGANIZED HEALTH CARE EDUCATION/TRAINING PROGRAM

## 2023-06-21 PROCEDURE — 99999 PR PBB SHADOW E&M-EST. PATIENT-LVL V: ICD-10-PCS | Mod: PBBFAC,,, | Performed by: STUDENT IN AN ORGANIZED HEALTH CARE EDUCATION/TRAINING PROGRAM

## 2023-06-21 PROCEDURE — 99999 PR PBB SHADOW E&M-EST. PATIENT-LVL V: CPT | Mod: PBBFAC,,, | Performed by: STUDENT IN AN ORGANIZED HEALTH CARE EDUCATION/TRAINING PROGRAM

## 2023-06-21 PROCEDURE — 3074F PR MOST RECENT SYSTOLIC BLOOD PRESSURE < 130 MM HG: ICD-10-PCS | Mod: CPTII,,, | Performed by: STUDENT IN AN ORGANIZED HEALTH CARE EDUCATION/TRAINING PROGRAM

## 2023-06-21 PROCEDURE — 1160F RVW MEDS BY RX/DR IN RCRD: CPT | Mod: CPTII,,, | Performed by: STUDENT IN AN ORGANIZED HEALTH CARE EDUCATION/TRAINING PROGRAM

## 2023-06-21 PROCEDURE — 99214 PR OFFICE/OUTPT VISIT, EST, LEVL IV, 30-39 MIN: ICD-10-PCS | Mod: S$PBB,,, | Performed by: STUDENT IN AN ORGANIZED HEALTH CARE EDUCATION/TRAINING PROGRAM

## 2023-06-21 PROCEDURE — 99214 OFFICE O/P EST MOD 30 MIN: CPT | Mod: S$PBB,,, | Performed by: STUDENT IN AN ORGANIZED HEALTH CARE EDUCATION/TRAINING PROGRAM

## 2023-06-21 PROCEDURE — 3078F PR MOST RECENT DIASTOLIC BLOOD PRESSURE < 80 MM HG: ICD-10-PCS | Mod: CPTII,,, | Performed by: STUDENT IN AN ORGANIZED HEALTH CARE EDUCATION/TRAINING PROGRAM

## 2023-06-21 PROCEDURE — 3044F PR MOST RECENT HEMOGLOBIN A1C LEVEL <7.0%: ICD-10-PCS | Mod: CPTII,,, | Performed by: STUDENT IN AN ORGANIZED HEALTH CARE EDUCATION/TRAINING PROGRAM

## 2023-06-21 PROCEDURE — 3074F SYST BP LT 130 MM HG: CPT | Mod: CPTII,,, | Performed by: STUDENT IN AN ORGANIZED HEALTH CARE EDUCATION/TRAINING PROGRAM

## 2023-06-21 PROCEDURE — 3066F NEPHROPATHY DOC TX: CPT | Mod: CPTII,,, | Performed by: STUDENT IN AN ORGANIZED HEALTH CARE EDUCATION/TRAINING PROGRAM

## 2023-06-21 NOTE — PROGRESS NOTES
"[facility] Note    HPI:  Jayden Canada Jr. is a 58 y.o. male who presents today for Follow-up (Follow up lab review. )    58-year-old male with hypertension, hyperlipidemia, vitamin-D deficiency, anemia, diabetes mellitus type 2, GERD, "early" lupus, chronic kidney disease stage 3B, peripheral artery disease for follow up BP check.     As a platelet donor, he was told recently that bacteria (?) was growing in his blood.   Patient denies any lesions and skin break over body and recent contact with sick.      Denies fever, chills, vision changes, dizziness, ear, eye pain, excessive headache, chest pain, palpitations, shortness of breath, abdominal pain, nausea, vomiting, voiding or stooling problems.      Care coordination:  Cardiology, Dr. Aguayo  Endocrinology, Dr. Cortez  Neurology, Dr. Garcia  Nephrology, Dr. Yung  [facility]. Dr. Sharma     ROS   A review of systems was performed and was negative except as noted above.    I personally reviewed allergies, past medical, surgical, social and family history and updated as appropriate.    Medications:    Current Outpatient Medications:     amitriptyline (ELAVIL) 100 MG tablet, Take 100 mg by mouth every evening., Disp: , Rfl:     aspirin (ECOTRIN) 81 MG EC tablet, Take 81 mg by mouth once daily. NOTIFIED DR. GAINES OFFICE, Disp: , Rfl:     baclofen (LIORESAL) 10 MG tablet, Take 10 mg by mouth 2 (two) times daily as needed., Disp: , Rfl:     carvediloL (COREG) 12.5 MG tablet, Take 12.5 mg by mouth 2 (two) times daily., Disp: , Rfl:     chlorthalidone (HYGROTEN) 25 MG Tab, Take 1/2 (one-half) tablet by mouth once daily, Disp: 15 tablet, Rfl: 0    cilostazol (PLETAL) 100 MG Tab, , Disp: , Rfl:     clopidogreL (PLAVIX) 75 mg tablet, Take 75 mg by mouth once daily. NOTIFIED DR. GAINES OFFICE, Disp: , Rfl:     dorzolamide-timolol 2-0.5% (COSOPT) 22.3-6.8 mg/mL ophthalmic solution, SMARTSI Drop(s) In Eye(s) Every 12 Hours, Disp: , " Rfl:     ferrous sulfate 325 (65 FE) MG EC tablet, Take by mouth., Disp: , Rfl:     gabapentin (NEURONTIN) 800 MG tablet, Take 400 mg by mouth 3 (three) times daily., Disp: , Rfl:     JARDIANCE 25 mg tablet, TAKE 1 TABLET BY MOUTH ONCE DAILY FOR DIABETES, Disp: , Rfl:     latanoprost 0.005 % ophthalmic solution, , Disp: , Rfl:     omeprazole (PRILOSEC) 20 MG capsule, Take 20 mg by mouth every morning., Disp: , Rfl:     rosuvastatin (CRESTOR) 40 MG Tab, Take 1 tablet (40 mg total) by mouth every evening., Disp: 90 tablet, Rfl: 3    STIOLTO RESPIMAT 2.5-2.5 mcg/actuation Mist, SMARTSI Puff(s) By Mouth Daily, Disp: , Rfl:     traZODone (DESYREL) 50 MG tablet, Take 0.5 tablets (25 mg total) by mouth every evening., Disp: 15 tablet, Rfl: 2    valsartan (DIOVAN) 160 MG tablet, Take 1 tablet by mouth once daily, Disp: 30 tablet, Rfl: 0    albuterol (PROVENTIL/VENTOLIN HFA) 90 mcg/actuation inhaler, Inhale 1-2 puffs into the lungs every 6 (six) hours as needed. Rescue, Disp: 18 g, Rfl: 0     Health Maintenance:  Immunization History   Administered Date(s) Administered    COVID-19, MRNA, LN-S, PF (MODERNA FULL 0.5 ML DOSE) 2021, 2021    DTP 1966, 1966, 1966, 1968, 10/28/1969, 2010    Hepatitis B, Pediatric/Adolescent 02/10/2012    IPV 1966, 1967, 1967, 10/28/1969    Influenza - Quadrivalent - PF *Preferred* (6 months and older) 10/12/2016, 2017, 10/04/2018, 10/03/2019, 10/13/2021, 2022, 2023    Influenza - Trivalent (ADULT) 2020    Influenza - Trivalent - PF (ADULT) 2022, 2023    MMR 1966, 1967, 1971, 08/15/1973    Pneumococcal Conjugate - 13 Valent 2019    Pneumococcal Conjugate - 20 Valent 2022    Pneumococcal Polysaccharide - 23 Valent 10/26/2018      Health Maintenance   Topic Date Due    Foot Exam  Never done    TETANUS VACCINE  Never done    Lipid Panel  2022    Hemoglobin A1c   "08/20/2023    Eye Exam  05/02/2024    High Dose Statin  06/21/2024    Hepatitis C Screening  Completed     Health Maintenance Topics with due status: Not Due       Topic Last Completion Date    Colorectal Cancer Screening 09/12/2022    Hemoglobin A1c 02/20/2023    Eye Exam 05/02/2023    High Dose Statin 06/21/2023     Health Maintenance Due   Topic Date Due    Diabetes Urine Screening  Never done    Foot Exam  Never done    HIV Screening  Never done    TETANUS VACCINE  Never done    Shingles Vaccine (1 of 2) Never done    COVID-19 Vaccine (3 - Moderna series) 06/01/2021    Lipid Panel  06/26/2022       PHYSICAL EXAM:  Vitals:    06/21/23 1441   BP: 128/75   BP Location: Right arm   Patient Position: Sitting   BP Method: Large (Automatic)   Pulse: 78   Resp: 16   Temp: 98.5 °F (36.9 °C)   TempSrc: Oral   SpO2: 97%   Weight: 98.4 kg (217 lb)   Height: 5' 11" (1.803 m)     Body mass index is 30.27 kg/m².  Physical Exam  Vitals and nursing note reviewed.   Constitutional:       General: He is not in acute distress.     Appearance: Normal appearance. He is not ill-appearing or toxic-appearing.   HENT:      Head: Normocephalic and atraumatic.      Right Ear: External ear normal.      Left Ear: External ear normal.      Nose: Nose normal.      Mouth/Throat:      Mouth: Mucous membranes are moist.      Pharynx: Oropharynx is clear.   Eyes:      Extraocular Movements: Extraocular movements intact.      Conjunctiva/sclera: Conjunctivae normal.   Cardiovascular:      Rate and Rhythm: Normal rate and regular rhythm.      Pulses: Normal pulses.      Heart sounds: Normal heart sounds. No murmur heard.  Pulmonary:      Effort: Pulmonary effort is normal. No respiratory distress.      Breath sounds: Normal breath sounds. No wheezing or rales.   Abdominal:      General: Abdomen is flat. Bowel sounds are normal. There is no distension.      Palpations: Abdomen is soft. There is no mass.      Tenderness: There is no abdominal " tenderness. There is no right CVA tenderness, left CVA tenderness or guarding.   Musculoskeletal:      Cervical back: Normal range of motion and neck supple. No rigidity.      Right lower leg: No edema.      Left lower leg: No edema.   Skin:     General: Skin is warm and dry.      Findings: No lesion or rash.   Neurological:      General: No focal deficit present.      Mental Status: He is alert and oriented to person, place, and time. Mental status is at baseline.      Cranial Nerves: No cranial nerve deficit.      Motor: No weakness.      Gait: Gait normal.      Comments: Right hand and finger loss of sensation in ulnar distribution   Psychiatric:         Mood and Affect: Mood normal.         Behavior: Behavior normal.         Thought Content: Thought content normal.      ASSESSMENT/PLAN:  1. Bacteremia  Assessment & Plan:  5/31/23: Blood cx x2 final report NGTD  Reassured patient with no evidence of bacterial infection in blood. Still requires follow up for vasculitis, chronic inflammatory processes as possible concerns raised by blood platelet transfusion center.   - f/u nephrology, neurology and hematology      2. Gastroesophageal reflux disease, unspecified whether esophagitis present  Assessment & Plan:  Takes daily prilosec 20 mg daily. Helps with symptoms. No EGD study in the past.   - f/u general surgery    Orders:  -     Ambulatory referral/consult to General Surgery; Future; Expected date: 06/30/2023    3. Type 2 diabetes mellitus without complication, with long-term current use of insulin  Assessment & Plan:  Patient recently stopped taking all his antihyperglycemics secondary to renal function decline.   Latest HgA1C 6.2% on 2/20/23  - f/u endocrinology    Orders:  -     Ambulatory referral/consult to General Surgery; Future; Expected date: 06/30/2023    4. Primary hypertension  Overview:  Recently stopped taking all his BP medications.   - resume daily carvedilol 12.5 mg BID  - resume valsartan 160 mg  daily  - chlorthalidone 12.5 mg daily  - maintain daily adequate hydration with at least 1.5 to 2 L water     Assessment & Plan:  Normotensive. Denies symptoms. Continue with above regimen.   - follow low sodium diet <2 grams or 2 teaspoons daily  - avoid salty and processed foods from diet  - DASH diet          5. Class 1 obesity with serious comorbidity and body mass index (BMI) of 30.0 to 30.9 in adult, unspecified obesity type  Overview:  Wt Readings from Last 8 Encounters:   06/21/23 98.4 kg (217 lb)   05/26/23 101.2 kg (223 lb)   04/27/23 100.2 kg (221 lb)   03/28/23 102.4 kg (225 lb 12.8 oz)   02/27/23 105.2 kg (232 lb)   01/07/23 100.7 kg (222 lb)   01/06/23 101.6 kg (224 lb)   04/07/22 106.6 kg (235 lb)         Assessment & Plan:  Approximately 5 pounds of weight loss since last visit.   Patient denies any new symptoms.   - Recommendations:   Stay physically active. As tolerated alternate resistance training with stretching and cardio. Goal of 150 minutes per week of moderate intensity activity or 7,500 - 10,000 steps per day. Follow the Mediterranean Diet. Include whole fresh fruits, vegetables, olive oil, seeds, nuts, whole grains, cold water fish, salmon, mackerel and lean cuts of meat.  Do not drink sugary/diet carbonated beverages. Decrease portion sizes slightly which will result in an approximately 500-calorie deficit. Avoid fast or fried and processed food, especially canned foods. Avoid refined carbohydrates, white starchy foods, flour, white potato, bread, muffins, and cakes. Consider substituting one meal a day with a meal replacement such as Slim fast, lean cuisine, or weight watcher's. Follow a healthy diet that includes enough calcium, vitamin D and proteins for bone health.      Orders:  -     Ambulatory referral/consult to General Surgery; Future; Expected date: 06/30/2023        Other than changes above, continue current medications and maintain follow up with specialists.      No  follow-ups on file.   Recent Results (from the past 2016 hour(s))   [facility] DIABETES EYE EXAM    Collection Time: 05/02/23 12:00 AM   Result Value Ref Range    Left Eye DM Retinopathy Negative     Right Eye DM Retinopathy Negative    Urinalysis, Reflex to Urine Culture Urine, Clean Catch    Collection Time: 05/26/23  3:15 PM    Specimen: Urine   Result Value Ref Range    Specimen UA Urine, Clean Catch     Color, UA Yellow Yellow, Straw, Shabnam    Appearance, UA Clear Clear    pH, UA 6.0 5.0 - 8.0    Specific Gravity, UA >=1.030 (A) 1.005 - 1.030    Protein, UA Negative Negative    Glucose, UA 4+ (A) Negative    Ketones, UA Negative Negative    Bilirubin (UA) Negative Negative    Occult Blood UA Negative Negative    Nitrite, UA Negative Negative    Urobilinogen, UA Negative <2.0 EU/dL    Leukocytes, UA Negative Negative   Urinalysis Microscopic    Collection Time: 05/26/23  3:15 PM   Result Value Ref Range    RBC, UA 0 0 - 4 /hpf    WBC, UA 1 0 - 5 /hpf    Bacteria Negative None-Occ /hpf    Yeast, UA None None    Squam Epithel, UA 1 /hpf    Hyaline Casts, UA 0.4 (A) 0-1/lpf /lpf    Microscopic Comment SEE COMMENT    CBC Auto Differential    Collection Time: 05/26/23  3:42 PM   Result Value Ref Range    WBC 6.28 3.90 - 12.70 K/uL    RBC 4.84 4.60 - 6.20 M/uL    Hemoglobin 13.5 (L) 14.0 - 18.0 g/dL    Hematocrit 43.5 40.0 - 54.0 %    MCV 90 82 - 98 fL    MCH 27.9 27.0 - 31.0 pg    MCHC 31.0 (L) 32.0 - 36.0 g/dL    RDW 14.8 (H) 11.5 - 14.5 %    Platelets 332 150 - 450 K/uL    MPV 10.1 9.2 - 12.9 fL    Immature Granulocytes 0.3 0.0 - 0.5 %    Gran # (ANC) 3.9 1.8 - 7.7 K/uL    Immature Grans (Abs) 0.02 0.00 - 0.04 K/uL    Lymph # 1.6 1.0 - 4.8 K/uL    Mono # 0.5 0.3 - 1.0 K/uL    Eos # 0.3 0.0 - 0.5 K/uL    Baso # 0.03 0.00 - 0.20 K/uL    nRBC 0 0 /100 WBC    Gran % 61.8 38.0 - 73.0 %    Lymph % 25.0 18.0 - 48.0 %    Mono % 8.1 4.0 - 15.0 %    Eosinophil % 4.3 0.0 - 8.0 %    Basophil % 0.5 0.0 - 1.9 %    Differential Method  Automated    Comprehensive Metabolic Panel    Collection Time: 05/26/23  3:42 PM   Result Value Ref Range    Sodium 140 136 - 145 mmol/L    Potassium 3.8 3.5 - 5.1 mmol/L    Chloride 105 95 - 110 mmol/L    CO2 31 (H) 23 - 29 mmol/L    Glucose 143 (H) 70 - 110 mg/dL    BUN 16 6 - 20 mg/dL    Creatinine 1.3 0.5 - 1.4 mg/dL    Calcium 9.7 8.7 - 10.5 mg/dL    Total Protein 8.5 (H) 6.0 - 8.4 g/dL    Albumin 3.4 (L) 3.5 - 5.2 g/dL    Total Bilirubin 0.4 0.1 - 1.0 mg/dL    Alkaline Phosphatase 110 55 - 135 U/L    AST 10 10 - 40 U/L    ALT 19 10 - 44 U/L    Anion Gap 4 (L) 8 - 16 mmol/L    eGFR >60.0 >60 mL/min/1.73 m^2   Blood culture    Collection Time: 05/26/23  3:42 PM    Specimen: Antecubital, Right; Blood   Result Value Ref Range    Blood Culture, Routine No growth after 5 days.    Sedimentation rate    Collection Time: 05/26/23  3:42 PM   Result Value Ref Range    Sed Rate 27 (H) 0 - 10 mm/Hr   C-Reactive Protein    Collection Time: 05/26/23  3:42 PM   Result Value Ref Range    CRP 1.60 (H) 0.00 - 0.75 mg/dL   Blood culture    Collection Time: 05/26/23  3:47 PM    Specimen: Antecubital, Left; Blood   Result Value Ref Range    Blood Culture, Routine No growth after 5 days.          Marge Menjivar DO  [facility]

## 2023-06-23 PROBLEM — E66.9 CLASS 1 OBESITY WITH SERIOUS COMORBIDITY AND BODY MASS INDEX (BMI) OF 30.0 TO 30.9 IN ADULT: Status: ACTIVE | Noted: 2022-03-10

## 2023-06-23 PROBLEM — E66.811 CLASS 1 OBESITY WITH SERIOUS COMORBIDITY AND BODY MASS INDEX (BMI) OF 30.0 TO 30.9 IN ADULT: Status: ACTIVE | Noted: 2022-03-10

## 2023-06-24 NOTE — ASSESSMENT & PLAN NOTE
5/31/23: Blood cx x2 final report NGTD  Reassured patient with no evidence of bacterial infection in blood. Still requires follow up for vasculitis, chronic inflammatory processes as possible concerns raised by blood platelet transfusion center.   - f/u nephrology, neurology and hematology

## 2023-06-24 NOTE — ASSESSMENT & PLAN NOTE
Approximately 5 pounds of weight loss since last visit.   Patient denies any new symptoms.   - Recommendations:   Stay physically active. As tolerated alternate resistance training with stretching and cardio. Goal of 150 minutes per week of moderate intensity activity or 7,500 - 10,000 steps per day. Follow the Mediterranean Diet. Include whole fresh fruits, vegetables, olive oil, seeds, nuts, whole grains, cold water fish, salmon, mackerel and lean cuts of meat.  Do not drink sugary/diet carbonated beverages. Decrease portion sizes slightly which will result in an approximately 500-calorie deficit. Avoid fast or fried and processed food, especially canned foods. Avoid refined carbohydrates, white starchy foods, flour, white potato, bread, muffins, and cakes. Consider substituting one meal a day with a meal replacement such as Slim fast, lean cuisine, or weight watcher's. Follow a healthy diet that includes enough calcium, vitamin D and proteins for bone health.

## 2023-06-27 ENCOUNTER — TELEPHONE (OUTPATIENT)
Dept: SURGERY | Facility: CLINIC | Age: 58
End: 2023-06-27
Payer: MEDICAID

## 2023-06-27 NOTE — TELEPHONE ENCOUNTER
Called patient to schedule appt from referral that was received. Patient did not answer and vm is full could not leave a message.

## 2023-06-29 RX ORDER — VALSARTAN 160 MG/1
160 TABLET ORAL DAILY
Qty: 90 TABLET | Refills: 1 | Status: SHIPPED | OUTPATIENT
Start: 2023-06-29 | End: 2023-12-10

## 2023-07-10 ENCOUNTER — PATIENT MESSAGE (OUTPATIENT)
Dept: ADMINISTRATIVE | Facility: HOSPITAL | Age: 58
End: 2023-07-10
Payer: MEDICAID

## 2023-07-17 DIAGNOSIS — I10 PRIMARY HYPERTENSION: ICD-10-CM

## 2023-07-17 DIAGNOSIS — G47.00 INSOMNIA, UNSPECIFIED TYPE: ICD-10-CM

## 2023-07-17 RX ORDER — TRAZODONE HYDROCHLORIDE 50 MG/1
25 TABLET ORAL NIGHTLY
Qty: 15 TABLET | Refills: 2 | Status: SHIPPED | OUTPATIENT
Start: 2023-07-17 | End: 2023-10-04

## 2023-07-17 RX ORDER — CHLORTHALIDONE 25 MG/1
12.5 TABLET ORAL DAILY
Qty: 15 TABLET | Refills: 0 | Status: SHIPPED | OUTPATIENT
Start: 2023-07-17 | End: 2023-08-19

## 2023-08-16 DIAGNOSIS — I10 PRIMARY HYPERTENSION: Primary | ICD-10-CM

## 2023-08-18 DIAGNOSIS — I10 PRIMARY HYPERTENSION: ICD-10-CM

## 2023-08-19 RX ORDER — CHLORTHALIDONE 25 MG/1
12.5 TABLET ORAL DAILY
Qty: 15 TABLET | Refills: 5 | Status: SHIPPED | OUTPATIENT
Start: 2023-08-19

## 2023-08-19 RX ORDER — CHLORTHALIDONE 25 MG/1
12.5 TABLET ORAL DAILY
Qty: 15 TABLET | Refills: 5 | Status: SHIPPED | OUTPATIENT
Start: 2023-08-19 | End: 2023-11-27 | Stop reason: SDUPTHER

## 2023-09-08 ENCOUNTER — PATIENT MESSAGE (OUTPATIENT)
Dept: ADMINISTRATIVE | Facility: OTHER | Age: 58
End: 2023-09-08
Payer: MEDICAID

## 2023-09-27 ENCOUNTER — HOSPITAL ENCOUNTER (OUTPATIENT)
Dept: RADIOLOGY | Facility: HOSPITAL | Age: 58
Discharge: HOME | End: 2023-09-27
Attending: INTERNAL MEDICINE
Payer: MEDICAID

## 2023-09-27 DIAGNOSIS — N18.31 STAGE 3A CHRONIC KIDNEY DISEASE: ICD-10-CM

## 2023-09-27 PROCEDURE — 76770 US EXAM ABDO BACK WALL COMP: CPT | Mod: TC

## 2023-10-04 ENCOUNTER — LAB VISIT (OUTPATIENT)
Dept: LAB | Facility: HOSPITAL | Age: 58
End: 2023-10-04
Attending: STUDENT IN AN ORGANIZED HEALTH CARE EDUCATION/TRAINING PROGRAM
Payer: MEDICAID

## 2023-10-04 DIAGNOSIS — G47.00 INSOMNIA, UNSPECIFIED TYPE: ICD-10-CM

## 2023-10-04 DIAGNOSIS — N18.31 STAGE 3A CHRONIC KIDNEY DISEASE: ICD-10-CM

## 2023-10-04 LAB
ALBUMIN SERPL BCP-MCNC: 3.7 G/DL (ref 3.5–5.2)
ANION GAP SERPL CALC-SCNC: 4 MMOL/L (ref 3–11)
BASOPHILS # BLD AUTO: 0.05 K/UL (ref 0–0.2)
BASOPHILS NFR BLD: 0.7 % (ref 0–1.9)
BUN SERPL-MCNC: 17 MG/DL (ref 6–20)
CALCIUM SERPL-MCNC: 9 MG/DL (ref 8.7–10.5)
CHLORIDE SERPL-SCNC: 107 MMOL/L (ref 95–110)
CO2 SERPL-SCNC: 27 MMOL/L (ref 23–29)
CREAT SERPL-MCNC: 1.4 MG/DL (ref 0.5–1.4)
CREAT UR-MCNC: 104 MG/DL (ref 23–375)
DIFFERENTIAL METHOD: ABNORMAL
EOSINOPHIL # BLD AUTO: 0.2 K/UL (ref 0–0.5)
EOSINOPHIL NFR BLD: 3.4 % (ref 0–8)
ERYTHROCYTE [DISTWIDTH] IN BLOOD BY AUTOMATED COUNT: 14.2 % (ref 11.5–14.5)
EST. GFR  (NO RACE VARIABLE): 57.9 ML/MIN/1.73 M^2
GLUCOSE SERPL-MCNC: 122 MG/DL (ref 70–110)
HCT VFR BLD AUTO: 42.7 % (ref 40–54)
HGB BLD-MCNC: 13.6 G/DL (ref 14–18)
IMM GRANULOCYTES # BLD AUTO: 0.01 K/UL (ref 0–0.04)
IMM GRANULOCYTES NFR BLD AUTO: 0.1 % (ref 0–0.5)
LYMPHOCYTES # BLD AUTO: 1.3 K/UL (ref 1–4.8)
LYMPHOCYTES NFR BLD: 18.3 % (ref 18–48)
MCH RBC QN AUTO: 28.9 PG (ref 27–31)
MCHC RBC AUTO-ENTMCNC: 31.9 G/DL (ref 32–36)
MCV RBC AUTO: 91 FL (ref 82–98)
MONOCYTES # BLD AUTO: 0.7 K/UL (ref 0.3–1)
MONOCYTES NFR BLD: 10.3 % (ref 4–15)
NEUTROPHILS # BLD AUTO: 4.7 K/UL (ref 1.8–7.7)
NEUTROPHILS NFR BLD: 67.2 % (ref 38–73)
NRBC BLD-RTO: 0 /100 WBC
PHOSPHATE SERPL-MCNC: 4 MG/DL (ref 2.7–4.5)
PLATELET # BLD AUTO: 302 K/UL (ref 150–450)
PMV BLD AUTO: 9.8 FL (ref 9.2–12.9)
POTASSIUM SERPL-SCNC: 3.5 MMOL/L (ref 3.5–5.1)
PROT UR-MCNC: 10.7 MG/DL (ref 0–15)
PROT/CREAT UR: 0.1 MG/G{CREAT} (ref 0–0.2)
RBC # BLD AUTO: 4.71 M/UL (ref 4.6–6.2)
SODIUM SERPL-SCNC: 138 MMOL/L (ref 136–145)
WBC # BLD AUTO: 7.01 K/UL (ref 3.9–12.7)

## 2023-10-04 PROCEDURE — 36415 COLL VENOUS BLD VENIPUNCTURE: CPT | Performed by: INTERNAL MEDICINE

## 2023-10-04 PROCEDURE — 83970 ASSAY OF PARATHORMONE: CPT | Performed by: INTERNAL MEDICINE

## 2023-10-04 PROCEDURE — 85025 COMPLETE CBC W/AUTO DIFF WBC: CPT | Performed by: INTERNAL MEDICINE

## 2023-10-04 PROCEDURE — 80069 RENAL FUNCTION PANEL: CPT | Performed by: INTERNAL MEDICINE

## 2023-10-04 PROCEDURE — 84156 ASSAY OF PROTEIN URINE: CPT | Performed by: INTERNAL MEDICINE

## 2023-10-04 RX ORDER — TRAZODONE HYDROCHLORIDE 50 MG/1
25 TABLET ORAL NIGHTLY
Qty: 30 TABLET | Refills: 2 | Status: SHIPPED | OUTPATIENT
Start: 2023-10-04 | End: 2024-04-01

## 2023-10-05 LAB — PTH-INTACT SERPL-MCNC: 38.9 PG/ML (ref 9–77)

## 2023-10-18 ENCOUNTER — PATIENT OUTREACH (OUTPATIENT)
Dept: ADMINISTRATIVE | Facility: HOSPITAL | Age: 58
End: 2023-10-18
Payer: MEDICAID

## 2023-10-31 ENCOUNTER — PATIENT MESSAGE (OUTPATIENT)
Dept: ADMINISTRATIVE | Facility: OTHER | Age: 58
End: 2023-10-31
Payer: MEDICAID

## 2023-11-03 ENCOUNTER — OFFICE VISIT (OUTPATIENT)
Dept: PRIMARY CARE CLINIC | Facility: CLINIC | Age: 58
End: 2023-11-03
Payer: MEDICAID

## 2023-11-03 VITALS
WEIGHT: 216 LBS | TEMPERATURE: 98 F | RESPIRATION RATE: 16 BRPM | SYSTOLIC BLOOD PRESSURE: 136 MMHG | HEART RATE: 66 BPM | OXYGEN SATURATION: 95 % | DIASTOLIC BLOOD PRESSURE: 69 MMHG | HEIGHT: 71 IN | BODY MASS INDEX: 30.24 KG/M2

## 2023-11-03 DIAGNOSIS — Z79.4 TYPE 2 DIABETES MELLITUS WITHOUT COMPLICATION, WITH LONG-TERM CURRENT USE OF INSULIN: ICD-10-CM

## 2023-11-03 DIAGNOSIS — S39.012A BACK STRAIN, INITIAL ENCOUNTER: ICD-10-CM

## 2023-11-03 DIAGNOSIS — G56.93 BILATERAL NEUROPATHY OF UPPER EXTREMITIES: Primary | ICD-10-CM

## 2023-11-03 DIAGNOSIS — D64.9 ANEMIA, UNSPECIFIED TYPE: ICD-10-CM

## 2023-11-03 DIAGNOSIS — I10 PRIMARY HYPERTENSION: ICD-10-CM

## 2023-11-03 DIAGNOSIS — N18.32 STAGE 3B CHRONIC KIDNEY DISEASE: ICD-10-CM

## 2023-11-03 DIAGNOSIS — E11.9 TYPE 2 DIABETES MELLITUS WITHOUT COMPLICATION, WITH LONG-TERM CURRENT USE OF INSULIN: ICD-10-CM

## 2023-11-03 DIAGNOSIS — E66.9 CLASS 1 OBESITY WITH SERIOUS COMORBIDITY AND BODY MASS INDEX (BMI) OF 30.0 TO 30.9 IN ADULT, UNSPECIFIED OBESITY TYPE: ICD-10-CM

## 2023-11-03 DIAGNOSIS — I73.9 PAD (PERIPHERAL ARTERY DISEASE): ICD-10-CM

## 2023-11-03 PROCEDURE — 3075F SYST BP GE 130 - 139MM HG: CPT | Mod: CPTII,,, | Performed by: STUDENT IN AN ORGANIZED HEALTH CARE EDUCATION/TRAINING PROGRAM

## 2023-11-03 PROCEDURE — 99215 OFFICE O/P EST HI 40 MIN: CPT | Mod: PBBFAC,PO | Performed by: STUDENT IN AN ORGANIZED HEALTH CARE EDUCATION/TRAINING PROGRAM

## 2023-11-03 PROCEDURE — 99214 PR OFFICE/OUTPT VISIT, EST, LEVL IV, 30-39 MIN: ICD-10-PCS | Mod: S$PBB,,, | Performed by: STUDENT IN AN ORGANIZED HEALTH CARE EDUCATION/TRAINING PROGRAM

## 2023-11-03 PROCEDURE — 3075F PR MOST RECENT SYSTOLIC BLOOD PRESS GE 130-139MM HG: ICD-10-PCS | Mod: CPTII,,, | Performed by: STUDENT IN AN ORGANIZED HEALTH CARE EDUCATION/TRAINING PROGRAM

## 2023-11-03 PROCEDURE — 3066F NEPHROPATHY DOC TX: CPT | Mod: CPTII,,, | Performed by: STUDENT IN AN ORGANIZED HEALTH CARE EDUCATION/TRAINING PROGRAM

## 2023-11-03 PROCEDURE — 3008F BODY MASS INDEX DOCD: CPT | Mod: CPTII,,, | Performed by: STUDENT IN AN ORGANIZED HEALTH CARE EDUCATION/TRAINING PROGRAM

## 2023-11-03 PROCEDURE — 4010F PR ACE/ARB THEARPY RXD/TAKEN: ICD-10-PCS | Mod: CPTII,,, | Performed by: STUDENT IN AN ORGANIZED HEALTH CARE EDUCATION/TRAINING PROGRAM

## 2023-11-03 PROCEDURE — 1160F RVW MEDS BY RX/DR IN RCRD: CPT | Mod: CPTII,,, | Performed by: STUDENT IN AN ORGANIZED HEALTH CARE EDUCATION/TRAINING PROGRAM

## 2023-11-03 PROCEDURE — 99214 OFFICE O/P EST MOD 30 MIN: CPT | Mod: S$PBB,,, | Performed by: STUDENT IN AN ORGANIZED HEALTH CARE EDUCATION/TRAINING PROGRAM

## 2023-11-03 PROCEDURE — 3078F DIAST BP <80 MM HG: CPT | Mod: CPTII,,, | Performed by: STUDENT IN AN ORGANIZED HEALTH CARE EDUCATION/TRAINING PROGRAM

## 2023-11-03 PROCEDURE — 3008F PR BODY MASS INDEX (BMI) DOCUMENTED: ICD-10-PCS | Mod: CPTII,,, | Performed by: STUDENT IN AN ORGANIZED HEALTH CARE EDUCATION/TRAINING PROGRAM

## 2023-11-03 PROCEDURE — 3044F HG A1C LEVEL LT 7.0%: CPT | Mod: CPTII,,, | Performed by: STUDENT IN AN ORGANIZED HEALTH CARE EDUCATION/TRAINING PROGRAM

## 2023-11-03 PROCEDURE — 1159F MED LIST DOCD IN RCRD: CPT | Mod: CPTII,,, | Performed by: STUDENT IN AN ORGANIZED HEALTH CARE EDUCATION/TRAINING PROGRAM

## 2023-11-03 PROCEDURE — 99999 PR PBB SHADOW E&M-EST. PATIENT-LVL V: ICD-10-PCS | Mod: PBBFAC,,, | Performed by: STUDENT IN AN ORGANIZED HEALTH CARE EDUCATION/TRAINING PROGRAM

## 2023-11-03 PROCEDURE — 4010F ACE/ARB THERAPY RXD/TAKEN: CPT | Mod: CPTII,,, | Performed by: STUDENT IN AN ORGANIZED HEALTH CARE EDUCATION/TRAINING PROGRAM

## 2023-11-03 PROCEDURE — 2023F DILAT RTA XM W/O RTNOPTHY: CPT | Mod: CPTII,,, | Performed by: STUDENT IN AN ORGANIZED HEALTH CARE EDUCATION/TRAINING PROGRAM

## 2023-11-03 PROCEDURE — 2023F PR DILATED RETINAL EXAM W/O EVID OF RETINOPATHY: ICD-10-PCS | Mod: CPTII,,, | Performed by: STUDENT IN AN ORGANIZED HEALTH CARE EDUCATION/TRAINING PROGRAM

## 2023-11-03 PROCEDURE — 1160F PR REVIEW ALL MEDS BY PRESCRIBER/CLIN PHARMACIST DOCUMENTED: ICD-10-PCS | Mod: CPTII,,, | Performed by: STUDENT IN AN ORGANIZED HEALTH CARE EDUCATION/TRAINING PROGRAM

## 2023-11-03 PROCEDURE — 3066F PR DOCUMENTATION OF TREATMENT FOR NEPHROPATHY: ICD-10-PCS | Mod: CPTII,,, | Performed by: STUDENT IN AN ORGANIZED HEALTH CARE EDUCATION/TRAINING PROGRAM

## 2023-11-03 PROCEDURE — 1159F PR MEDICATION LIST DOCUMENTED IN MEDICAL RECORD: ICD-10-PCS | Mod: CPTII,,, | Performed by: STUDENT IN AN ORGANIZED HEALTH CARE EDUCATION/TRAINING PROGRAM

## 2023-11-03 PROCEDURE — 3078F PR MOST RECENT DIASTOLIC BLOOD PRESSURE < 80 MM HG: ICD-10-PCS | Mod: CPTII,,, | Performed by: STUDENT IN AN ORGANIZED HEALTH CARE EDUCATION/TRAINING PROGRAM

## 2023-11-03 PROCEDURE — 99999 PR PBB SHADOW E&M-EST. PATIENT-LVL V: CPT | Mod: PBBFAC,,, | Performed by: STUDENT IN AN ORGANIZED HEALTH CARE EDUCATION/TRAINING PROGRAM

## 2023-11-03 PROCEDURE — 3044F PR MOST RECENT HEMOGLOBIN A1C LEVEL <7.0%: ICD-10-PCS | Mod: CPTII,,, | Performed by: STUDENT IN AN ORGANIZED HEALTH CARE EDUCATION/TRAINING PROGRAM

## 2023-11-03 RX ORDER — AMITRIPTYLINE HYDROCHLORIDE 100 MG/1
100 TABLET ORAL NIGHTLY
Qty: 30 TABLET | Refills: 5 | Status: SHIPPED | OUTPATIENT
Start: 2023-11-03 | End: 2024-05-01

## 2023-11-03 RX ORDER — METHOCARBAMOL 750 MG/1
750 TABLET, FILM COATED ORAL 4 TIMES DAILY
Qty: 28 TABLET | Refills: 0 | Status: SHIPPED | OUTPATIENT
Start: 2023-11-03 | End: 2023-11-10

## 2023-11-03 RX ORDER — FERROUS SULFATE 325(65) MG
325 TABLET, DELAYED RELEASE (ENTERIC COATED) ORAL DAILY
Qty: 30 TABLET | Refills: 5 | Status: SHIPPED | OUTPATIENT
Start: 2023-11-03 | End: 2024-05-01

## 2023-11-03 RX ORDER — EMPAGLIFLOZIN 25 MG/1
25 TABLET, FILM COATED ORAL DAILY
Qty: 30 TABLET | Refills: 11 | Status: SHIPPED | OUTPATIENT
Start: 2023-11-03 | End: 2024-11-02

## 2023-11-27 PROBLEM — S39.012A STRAIN OF BACK: Status: ACTIVE | Noted: 2023-11-27

## 2023-11-27 PROBLEM — R78.81 BACTEREMIA: Status: RESOLVED | Noted: 2023-06-15 | Resolved: 2023-11-27

## 2023-11-27 PROBLEM — D64.9 ANEMIA: Status: ACTIVE | Noted: 2023-11-27

## 2023-11-27 RX ORDER — GABAPENTIN 800 MG/1
400 TABLET ORAL 3 TIMES DAILY
Qty: 60 TABLET | Refills: 5 | Status: SHIPPED | OUTPATIENT
Start: 2023-11-27

## 2023-11-27 NOTE — ASSESSMENT & PLAN NOTE
Lab Results   Component Value Date    HGB 13.6 (L) 10/04/2023    HGB 13.1 (L) 09/27/2023    HGB 13.5 (L) 05/26/2023   Inconsistently taking iron supplementation.   Counseled on necessary dietary intake of micronutrients including iron, vitamin B12, folate, vitamin D, zinc, magnesium.  - counseled on iron fortified food items to incorporate in daily meals  - wheat germ, dried fruits, nuts and seeds, whole grain and fortified breads and cereals, dried beans, lentils, and split peas, leafy, dark-green vegetables, eggs  - continue daily iron supplementation, take with orange juice  - do not take supplements within 2-3 hours of taking daily antiacids, such as prilosec  - f/u 6-8 weeks with iron studies

## 2023-11-27 NOTE — ASSESSMENT & PLAN NOTE
BP Readings from Last 3 Encounters:   11/03/23 136/69   10/03/23 (!) 117/59   06/21/23 128/75      Normotensive. Denies symptoms. Continue with above regimen.   - follow low sodium diet <2 grams or 2 teaspoons daily  - avoid salty and processed foods from diet  - DASH diet

## 2023-11-27 NOTE — ASSESSMENT & PLAN NOTE
Stable weight.   Recommendations:   Stay physically active. As tolerated alternate resistance training with stretching and cardio. Goal of 150 minutes per week of moderate intensity activity or 7,500 - 10,000 steps per day. Follow the Mediterranean Diet. Include whole fresh fruits, vegetables, olive oil, seeds, nuts, whole grains, cold water fish, salmon, mackerel and lean cuts of meat.  Do not drink sugary/diet carbonated beverages. Decrease portion sizes slightly which will result in an approximately 500-calorie deficit. Avoid fast or fried and processed food, especially canned foods. Avoid refined carbohydrates, white starchy foods, flour, white potato, bread, muffins, and cakes. Consider substituting one meal a day with a meal replacement such as Slim fast, lean cuisine, or weight watcher's. Follow a healthy diet that includes enough calcium, vitamin D and proteins for bone health.

## 2023-11-27 NOTE — ASSESSMENT & PLAN NOTE
Have right arm ulnar nerve injury.   Follows [facility]y in [place]. Follow up 3/8/2023.   - continue Gabapentin 400 mg TID   - start amitriptyline 100 mg QHS   - f/u 4 weeks  - f/u neurology

## 2023-11-27 NOTE — PROGRESS NOTES
"[facility] Note    HPI:  Jayden Canada Jr. is a 58 y.o. male who presents today for Back Pain (Patient states he hurt his back last week)    58 year old with hypertension, hyperlipidemia, vitamin-D deficiency, anemia, diabetes mellitus type 2, GERD, "early" lupus, chronic kidney disease stage 3B, peripheral artery disease for follow up BP check.      Denies fever, chills, vision changes, dizziness, ear, eye pain, excessive headache, chest pain, palpitations, shortness of breath, abdominal pain, nausea, vomiting, voiding or stooling problems.      Care coordination:  Cardiology, Dr. Aguayo  Endocrinology, Dr. Cortez  Neurology, Dr. Garcia  Nephrology, Dr. Yung  [facility]. Dr. Sharma    ROS   A review of systems was performed and was negative except as noted above.    I personally reviewed allergies, past medical, surgical, social and family history and updated as appropriate.    Medications:    Current Outpatient Medications:     aspirin (ECOTRIN) 81 MG EC tablet, Take 81 mg by mouth once daily. NOTIFIED DR. GAINES OFFICE, Disp: , Rfl:     carvediloL (COREG) 12.5 MG tablet, Take 12.5 mg by mouth 2 (two) times daily., Disp: , Rfl:     chlorthalidone (HYGROTEN) 25 MG Tab, Take 0.5 tablets (12.5 mg total) by mouth once daily., Disp: 15 tablet, Rfl: 5    cilostazol (PLETAL) 100 MG Tab, , Disp: , Rfl:     clopidogreL (PLAVIX) 75 mg tablet, Take 75 mg by mouth once daily. NOTIFIED DR. GAINES OFFICE, Disp: , Rfl:     dorzolamide-timolol 2-0.5% (COSOPT) 22.3-6.8 mg/mL ophthalmic solution, SMARTSI Drop(s) In Eye(s) Every 12 Hours, Disp: , Rfl:     latanoprost 0.005 % ophthalmic solution, , Disp: , Rfl:     omeprazole (PRILOSEC) 20 MG capsule, Take 20 mg by mouth every morning., Disp: , Rfl:     rosuvastatin (CRESTOR) 40 MG Tab, Take 1 tablet (40 mg total) by mouth every evening., Disp: 90 tablet, Rfl: 3    STIOLTO RESPIMAT 2.5-2.5 mcg/actuation Mist, SMARTSI Puff(s) By Mouth Daily, " Disp: , Rfl:     traZODone (DESYREL) 50 MG tablet, Take 0.5 tablets (25 mg total) by mouth every evening., Disp: 30 tablet, Rfl: 2    valsartan (DIOVAN) 160 MG tablet, Take 1 tablet (160 mg total) by mouth once daily., Disp: 90 tablet, Rfl: 1    albuterol (PROVENTIL/VENTOLIN HFA) 90 mcg/actuation inhaler, Inhale 1-2 puffs into the lungs every 6 (six) hours as needed. Rescue, Disp: 18 g, Rfl: 0    amitriptyline (ELAVIL) 100 MG tablet, Take 1 tablet (100 mg total) by mouth every evening., Disp: 30 tablet, Rfl: 5    ferrous sulfate 325 (65 FE) MG EC tablet, Take 1 tablet (325 mg total) by mouth once daily., Disp: 30 tablet, Rfl: 5    gabapentin (NEURONTIN) 800 MG tablet, Take 0.5 tablets (400 mg total) by mouth 3 (three) times daily., Disp: 60 tablet, Rfl: 5    JARDIANCE 25 mg tablet, Take 1 tablet (25 mg total) by mouth once daily., Disp: 30 tablet, Rfl: 11     Health Maintenance:  Immunization History   Administered Date(s) Administered    COVID-19, MRNA, LN-S, PF (MODERNA FULL 0.5 ML DOSE) 03/01/2021, 04/06/2021    DTP 02/23/1966, 03/30/1966, 05/04/1966, 11/27/1968, 10/28/1969, 12/05/2010    Hepatitis B, Pediatric/Adolescent 02/10/2012    IPV 05/04/1966, 04/12/1967, 05/17/1967, 10/28/1969    Influenza - Quadrivalent - PF *Preferred* (6 months and older) 10/12/2016, 08/26/2017, 10/04/2018, 10/03/2019, 10/13/2021, 01/06/2022, 01/19/2023    Influenza - Trivalent (ADULT) 11/05/2020    Influenza - Trivalent - PF (ADULT) 01/06/2022, 01/19/2023    MMR 02/23/1966, 11/29/1967, 02/18/1971, 08/15/1973    Pneumococcal Conjugate - 13 Valent 11/25/2019    Pneumococcal Conjugate - 20 Valent 08/16/2022    Pneumococcal Polysaccharide - 23 Valent 10/26/2018      Health Maintenance   Topic Date Due    Foot Exam  Never done    TETANUS VACCINE  Never done    Shingles Vaccine (1 of 2) Never done    Lipid Panel  06/26/2022    Hemoglobin A1c  08/20/2023    LDCT Lung Screen  02/10/2024    Eye Exam  05/02/2024    High Dose Statin  11/03/2024  "   Colorectal Cancer Screening  09/12/2025    Hepatitis C Screening  Completed     Health Maintenance Topics with due status: Not Due       Topic Last Completion Date    Colorectal Cancer Screening 09/12/2022    LDCT Lung Screen 02/10/2023    Eye Exam 05/02/2023    High Dose Statin 11/03/2023     Health Maintenance Due   Topic Date Due    Diabetes Urine Screening  Never done    Foot Exam  Never done    HIV Screening  Never done    TETANUS VACCINE  Never done    Shingles Vaccine (1 of 2) Never done    Lipid Panel  06/26/2022    Hemoglobin A1c  08/20/2023    COVID-19 Vaccine (3 - 2023-24 season) 09/01/2023       PHYSICAL EXAM:  Vitals:    11/03/23 1403   BP: 136/69   BP Location: Left arm   Patient Position: Sitting   BP Method: Large (Automatic)   Pulse: 66   Resp: 16   Temp: 97.6 °F (36.4 °C)   SpO2: 95%   Weight: 98 kg (216 lb)   Height: 5' 11" (1.803 m)     Body mass index is 30.13 kg/m².  Physical Exam  Vitals and nursing note reviewed.   Constitutional:       General: He is not in acute distress.     Appearance: Normal appearance. He is not ill-appearing or toxic-appearing.   HENT:      Head: Normocephalic and atraumatic.      Right Ear: External ear normal.      Left Ear: External ear normal.      Nose: Nose normal.      Mouth/Throat:      Mouth: Mucous membranes are moist.      Pharynx: Oropharynx is clear.   Eyes:      Extraocular Movements: Extraocular movements intact.      Conjunctiva/sclera: Conjunctivae normal.   Cardiovascular:      Rate and Rhythm: Normal rate and regular rhythm.      Pulses: Normal pulses.      Heart sounds: Normal heart sounds. No murmur heard.  Pulmonary:      Effort: Pulmonary effort is normal. No respiratory distress.      Breath sounds: Normal breath sounds. No wheezing or rales.   Abdominal:      General: Abdomen is flat. Bowel sounds are normal. There is no distension.      Palpations: Abdomen is soft. There is no mass.      Tenderness: There is no abdominal tenderness. There " is no right CVA tenderness, left CVA tenderness or guarding.   Musculoskeletal:         General: Tenderness (lumbar fullness bilaterally) present. No swelling.      Cervical back: Normal range of motion and neck supple. No rigidity.      Right lower leg: No edema.      Left lower leg: No edema.      Comments: No bony tenderness over cervical, thoracic, lumbar spine   Skin:     General: Skin is warm and dry.      Findings: No lesion or rash.   Neurological:      General: No focal deficit present.      Mental Status: He is alert and oriented to person, place, and time. Mental status is at baseline.      Cranial Nerves: No cranial nerve deficit.      Motor: No weakness.      Gait: Gait normal.   Psychiatric:         Mood and Affect: Mood normal.         Behavior: Behavior normal.         Thought Content: Thought content normal.        ASSESSMENT/PLAN:  1. Bilateral neuropathy of upper extremities  Assessment & Plan:  Have right arm ulnar nerve injury.   Follows [facility] neurology in [place]. Follow up 3/8/2023.   - continue Gabapentin 400 mg TID   - start amitriptyline 100 mg QHS   - f/u 4 weeks  - f/u neurology    Orders:  -     amitriptyline (ELAVIL) 100 MG tablet; Take 1 tablet (100 mg total) by mouth every evening.  Dispense: 30 tablet; Refill: 5  -     gabapentin (NEURONTIN) 800 MG tablet; Take 0.5 tablets (400 mg total) by mouth 3 (three) times daily.  Dispense: 60 tablet; Refill: 5    2. Type 2 diabetes mellitus without complication, with long-term current use of insulin  Assessment & Plan:  Patient recently stopped taking all his antihyperglycemics secondary to renal function decline.   Lab Results   Component Value Date    HGBA1C 6.2 (A) 02/20/2023   - f/u endocrinology    Orders:  -     JARDIANCE 25 mg tablet; Take 1 tablet (25 mg total) by mouth once daily.  Dispense: 30 tablet; Refill: 11    3. Stage 3b chronic kidney disease  Assessment & Plan:  Lab Results   Component Value Date    CREATININE 1.4 10/04/2023     BUN 17 10/04/2023     10/04/2023    K 3.5 10/04/2023     10/04/2023    CO2 27 10/04/2023   Stable renal function. Cr within normal limits.  - maintain daily adequate hydration  - avoid nephrotoxic medications including use of NSAIDs        4. Primary hypertension  Overview:  11/3  - carvedilol 12.5 mg BID  - valsartan 160 mg daily  - chlorthalidone 12.5 mg daily  - maintain daily adequate hydration with at least 1.5 to 2 L water     Assessment & Plan:  BP Readings from Last 3 Encounters:   11/03/23 136/69   10/03/23 (!) 117/59   06/21/23 128/75      Normotensive. Denies symptoms. Continue with above regimen.   - follow low sodium diet <2 grams or 2 teaspoons daily  - avoid salty and processed foods from diet  - DASH diet          5. Back strain, initial encounter  -     methocarbamoL (ROBAXIN) 750 MG Tab; Take 1 tablet (750 mg total) by mouth 4 (four) times daily. for 7 days  Dispense: 28 tablet; Refill: 0    6. Anemia, unspecified type  Assessment & Plan:  Lab Results   Component Value Date    HGB 13.6 (L) 10/04/2023    HGB 13.1 (L) 09/27/2023    HGB 13.5 (L) 05/26/2023   Inconsistently taking iron supplementation.   Counseled on necessary dietary intake of micronutrients including iron, vitamin B12, folate, vitamin D, zinc, magnesium.  - counseled on iron fortified food items to incorporate in daily meals  - wheat germ, dried fruits, nuts and seeds, whole grain and fortified breads and cereals, dried beans, lentils, and split peas, leafy, dark-green vegetables, eggs  - continue daily iron supplementation, take with orange juice  - do not take supplements within 2-3 hours of taking daily antiacids, such as prilosec  - f/u 6-8 weeks with iron studies      Orders:  -     ferrous sulfate 325 (65 FE) MG EC tablet; Take 1 tablet (325 mg total) by mouth once daily.  Dispense: 30 tablet; Refill: 5    7. PAD (peripheral artery disease)  Assessment & Plan:  Left leg claudication. Currently taking   - cilostazol  100 mg daily  - daily aspirin, plavix, and statin  - stop smoking  - continue with daily physical activity as tolerated  - follow up cardiovascular specialist notes        8. Class 1 obesity with serious comorbidity and body mass index (BMI) of 30.0 to 30.9 in adult, unspecified obesity type  Overview:  Wt Readings from Last 8 Encounters:   11/03/23 98 kg (216 lb)   10/03/23 98.2 kg (216 lb 6.4 oz)   06/21/23 98.4 kg (217 lb)   05/26/23 101.2 kg (223 lb)   04/27/23 100.2 kg (221 lb)   03/28/23 102.4 kg (225 lb 12.8 oz)   02/27/23 105.2 kg (232 lb)   01/07/23 100.7 kg (222 lb)          Assessment & Plan:  Stable weight.   Recommendations:   Stay physically active. As tolerated alternate resistance training with stretching and cardio. Goal of 150 minutes per week of moderate intensity activity or 7,500 - 10,000 steps per day. Follow the Mediterranean Diet. Include whole fresh fruits, vegetables, olive oil, seeds, nuts, whole grains, cold water fish, salmon, mackerel and lean cuts of meat.  Do not drink sugary/diet carbonated beverages. Decrease portion sizes slightly which will result in an approximately 500-calorie deficit. Avoid fast or fried and processed food, especially canned foods. Avoid refined carbohydrates, white starchy foods, flour, white potato, bread, muffins, and cakes. Consider substituting one meal a day with a meal replacement such as Slim fast, lean cuisine, or weight watcher's. Follow a healthy diet that includes enough calcium, vitamin D and proteins for bone health.          Other than changes above, continue current medications and maintain follow up with specialists.      No follow-ups on file.   Recent Results (from the past 2016 hour(s))   Protein / creatinine ratio, urine    Collection Time: 09/27/23 10:38 AM   Result Value Ref Range    Protein, Urine Random 7.1 0.0 - 15.0 mg/dL    Creatinine, Urine 91.3 23.0 - 375.0 mg/dL    Prot/Creat Ratio, Urine 0.08 0.00 - 0.20   CBC Auto Differential     Collection Time: 09/27/23 11:11 AM   Result Value Ref Range    WBC 7.59 3.90 - 12.70 K/uL    RBC 4.61 4.60 - 6.20 M/uL    Hemoglobin 13.1 (L) 14.0 - 18.0 g/dL    Hematocrit 41.9 40.0 - 54.0 %    MCV 91 82 - 98 fL    MCH 28.4 27.0 - 31.0 pg    MCHC 31.3 (L) 32.0 - 36.0 g/dL    RDW 14.4 11.5 - 14.5 %    Platelets 238 150 - 450 K/uL    MPV 10.8 9.2 - 12.9 fL    Immature Granulocytes 0.3 0.0 - 0.5 %    Gran # (ANC) 5.7 1.8 - 7.7 K/uL    Immature Grans (Abs) 0.02 0.00 - 0.04 K/uL    Lymph # 1.0 1.0 - 4.8 K/uL    Mono # 0.6 0.3 - 1.0 K/uL    Eos # 0.2 0.0 - 0.5 K/uL    Baso # 0.03 0.00 - 0.20 K/uL    nRBC 0 0 /100 WBC    Gran % 74.5 (H) 38.0 - 73.0 %    Lymph % 13.6 (L) 18.0 - 48.0 %    Mono % 8.4 4.0 - 15.0 %    Eosinophil % 2.8 0.0 - 8.0 %    Basophil % 0.4 0.0 - 1.9 %    Differential Method Automated    Renal Function Panel    Collection Time: 09/27/23 11:12 AM   Result Value Ref Range    Glucose 256 (H) 70 - 110 mg/dL    Sodium 137 136 - 145 mmol/L    Potassium 3.3 (L) 3.5 - 5.1 mmol/L    Chloride 104 95 - 110 mmol/L    CO2 28 23 - 29 mmol/L    BUN 14 6 - 20 mg/dL    Calcium 8.5 (L) 8.7 - 10.5 mg/dL    Creatinine 1.4 0.5 - 1.4 mg/dL    Albumin 3.7 3.5 - 5.2 g/dL    Phosphorus 4.9 (H) 2.7 - 4.5 mg/dL    eGFR 57.9 (A) >60 mL/min/1.73 m^2    Anion Gap 5 3 - 11 mmol/L   Protein / creatinine ratio, urine    Collection Time: 10/04/23  2:11 PM   Result Value Ref Range    Protein, Urine Random 10.7 0.0 - 15.0 mg/dL    Creatinine, Urine 104.0 23.0 - 375.0 mg/dL    Prot/Creat Ratio, Urine 0.10 0.00 - 0.20   Renal Function Panel    Collection Time: 10/04/23  2:18 PM   Result Value Ref Range    Glucose 122 (H) 70 - 110 mg/dL    Sodium 138 136 - 145 mmol/L    Potassium 3.5 3.5 - 5.1 mmol/L    Chloride 107 95 - 110 mmol/L    CO2 27 23 - 29 mmol/L    BUN 17 6 - 20 mg/dL    Calcium 9.0 8.7 - 10.5 mg/dL    Creatinine 1.4 0.5 - 1.4 mg/dL    Albumin 3.7 3.5 - 5.2 g/dL    Phosphorus 4.0 2.7 - 4.5 mg/dL    eGFR 57.9 (A) >60 mL/min/1.73  m^2    Anion Gap 4 3 - 11 mmol/L   CBC Auto Differential    Collection Time: 10/04/23  2:18 PM   Result Value Ref Range    WBC 7.01 3.90 - 12.70 K/uL    RBC 4.71 4.60 - 6.20 M/uL    Hemoglobin 13.6 (L) 14.0 - 18.0 g/dL    Hematocrit 42.7 40.0 - 54.0 %    MCV 91 82 - 98 fL    MCH 28.9 27.0 - 31.0 pg    MCHC 31.9 (L) 32.0 - 36.0 g/dL    RDW 14.2 11.5 - 14.5 %    Platelets 302 150 - 450 K/uL    MPV 9.8 9.2 - 12.9 fL    Immature Granulocytes 0.1 0.0 - 0.5 %    Gran # (ANC) 4.7 1.8 - 7.7 K/uL    Immature Grans (Abs) 0.01 0.00 - 0.04 K/uL    Lymph # 1.3 1.0 - 4.8 K/uL    Mono # 0.7 0.3 - 1.0 K/uL    Eos # 0.2 0.0 - 0.5 K/uL    Baso # 0.05 0.00 - 0.20 K/uL    nRBC 0 0 /100 WBC    Gran % 67.2 38.0 - 73.0 %    Lymph % 18.3 18.0 - 48.0 %    Mono % 10.3 4.0 - 15.0 %    Eosinophil % 3.4 0.0 - 8.0 %    Basophil % 0.7 0.0 - 1.9 %    Differential Method Automated    PTH, intact    Collection Time: 10/04/23  2:18 PM   Result Value Ref Range    PTH, Intact 38.9 9.0 - 77.0 pg/mL         Marge Menjivar DO  [facility]

## 2023-11-27 NOTE — ASSESSMENT & PLAN NOTE
Left leg claudication. Currently taking   - cilostazol 100 mg daily  - daily aspirin, plavix, and statin  - stop smoking  - continue with daily physical activity as tolerated  - follow up cardiovascular specialist notes

## 2023-11-27 NOTE — ASSESSMENT & PLAN NOTE
Patient recently stopped taking all his antihyperglycemics secondary to renal function decline.   Lab Results   Component Value Date    HGBA1C 6.2 (A) 02/20/2023   - f/u endocrinology

## 2023-11-27 NOTE — ASSESSMENT & PLAN NOTE
Lab Results   Component Value Date    CREATININE 1.4 10/04/2023    BUN 17 10/04/2023     10/04/2023    K 3.5 10/04/2023     10/04/2023    CO2 27 10/04/2023   Stable renal function. Cr within normal limits.  - maintain daily adequate hydration  - avoid nephrotoxic medications including use of NSAIDs

## 2023-12-05 ENCOUNTER — PATIENT OUTREACH (OUTPATIENT)
Dept: ADMINISTRATIVE | Facility: HOSPITAL | Age: 58
End: 2023-12-05
Payer: MEDICAID

## 2023-12-05 DIAGNOSIS — E11.9 TYPE 2 DIABETES MELLITUS WITHOUT COMPLICATION, WITH LONG-TERM CURRENT USE OF INSULIN: Primary | ICD-10-CM

## 2023-12-05 DIAGNOSIS — Z79.4 TYPE 2 DIABETES MELLITUS WITHOUT COMPLICATION, WITH LONG-TERM CURRENT USE OF INSULIN: Primary | ICD-10-CM

## 2023-12-08 DIAGNOSIS — I10 PRIMARY HYPERTENSION: Primary | ICD-10-CM

## 2023-12-10 RX ORDER — VALSARTAN 160 MG/1
160 TABLET ORAL
Qty: 90 TABLET | Refills: 3 | Status: SHIPPED | OUTPATIENT
Start: 2023-12-10

## 2023-12-14 ENCOUNTER — LAB VISIT (OUTPATIENT)
Dept: LAB | Facility: HOSPITAL | Age: 58
End: 2023-12-14
Attending: INTERNAL MEDICINE
Payer: MEDICAID

## 2023-12-14 DIAGNOSIS — E78.49 OTHER HYPERLIPIDEMIA: ICD-10-CM

## 2023-12-14 DIAGNOSIS — E11.9 TYPE 2 DIABETES MELLITUS WITHOUT COMPLICATION: ICD-10-CM

## 2023-12-14 LAB
ALBUMIN/CREAT UR: NORMAL UG/MG (ref 0–30)
CHOLEST SERPL-MCNC: 134 MG/DL (ref 120–199)
CHOLEST/HDLC SERPL: 4.2 {RATIO} (ref 2–5)
CREAT UR-MCNC: 108 MG/DL (ref 23–375)
ESTIMATED AVG GLUCOSE: 137 MG/DL (ref 68–131)
HBA1C MFR BLD: 6.4 % (ref 4–5.6)
HDLC SERPL-MCNC: 32 MG/DL (ref 40–75)
HDLC SERPL: 23.9 % (ref 20–50)
LDLC SERPL CALC-MCNC: 74.4 MG/DL (ref 63–159)
MICROALBUMIN UR DL<=1MG/L-MCNC: <5 MG/L
NONHDLC SERPL-MCNC: 102 MG/DL
TRIGL SERPL-MCNC: 138 MG/DL (ref 30–150)
TSH SERPL DL<=0.005 MIU/L-ACNC: 1.58 UIU/ML (ref 0.4–4)

## 2023-12-14 PROCEDURE — 84443 ASSAY THYROID STIM HORMONE: CPT | Performed by: STUDENT IN AN ORGANIZED HEALTH CARE EDUCATION/TRAINING PROGRAM

## 2023-12-14 PROCEDURE — 83036 HEMOGLOBIN GLYCOSYLATED A1C: CPT | Performed by: STUDENT IN AN ORGANIZED HEALTH CARE EDUCATION/TRAINING PROGRAM

## 2023-12-14 PROCEDURE — 80061 LIPID PANEL: CPT | Performed by: STUDENT IN AN ORGANIZED HEALTH CARE EDUCATION/TRAINING PROGRAM

## 2023-12-14 PROCEDURE — 82043 UR ALBUMIN QUANTITATIVE: CPT | Performed by: STUDENT IN AN ORGANIZED HEALTH CARE EDUCATION/TRAINING PROGRAM

## 2023-12-14 PROCEDURE — 36415 COLL VENOUS BLD VENIPUNCTURE: CPT | Performed by: STUDENT IN AN ORGANIZED HEALTH CARE EDUCATION/TRAINING PROGRAM

## 2023-12-21 ENCOUNTER — OFFICE VISIT (OUTPATIENT)
Dept: PRIMARY CARE CLINIC | Facility: CLINIC | Age: 58
End: 2023-12-21
Payer: MEDICAID

## 2023-12-21 VITALS
SYSTOLIC BLOOD PRESSURE: 123 MMHG | HEART RATE: 79 BPM | OXYGEN SATURATION: 98 % | HEIGHT: 71 IN | RESPIRATION RATE: 16 BRPM | WEIGHT: 217 LBS | DIASTOLIC BLOOD PRESSURE: 58 MMHG | BODY MASS INDEX: 30.38 KG/M2 | TEMPERATURE: 98 F

## 2023-12-21 DIAGNOSIS — E66.9 CLASS 1 OBESITY WITH SERIOUS COMORBIDITY AND BODY MASS INDEX (BMI) OF 30.0 TO 30.9 IN ADULT, UNSPECIFIED OBESITY TYPE: ICD-10-CM

## 2023-12-21 DIAGNOSIS — I73.9 BILATERAL CLAUDICATION OF LOWER LIMB: ICD-10-CM

## 2023-12-21 DIAGNOSIS — N18.32 STAGE 3B CHRONIC KIDNEY DISEASE: ICD-10-CM

## 2023-12-21 DIAGNOSIS — Z12.11 COLON CANCER SCREENING: ICD-10-CM

## 2023-12-21 DIAGNOSIS — G47.9 SLEEPING DIFFICULTIES: ICD-10-CM

## 2023-12-21 DIAGNOSIS — D64.9 ANEMIA, UNSPECIFIED TYPE: ICD-10-CM

## 2023-12-21 DIAGNOSIS — I10 PRIMARY HYPERTENSION: ICD-10-CM

## 2023-12-21 DIAGNOSIS — I73.9 PAD (PERIPHERAL ARTERY DISEASE): ICD-10-CM

## 2023-12-21 DIAGNOSIS — Z79.4 TYPE 2 DIABETES MELLITUS WITHOUT COMPLICATION, WITH LONG-TERM CURRENT USE OF INSULIN: ICD-10-CM

## 2023-12-21 DIAGNOSIS — H40.9 GLAUCOMA, UNSPECIFIED GLAUCOMA TYPE, UNSPECIFIED LATERALITY: Chronic | ICD-10-CM

## 2023-12-21 DIAGNOSIS — G56.93 BILATERAL NEUROPATHY OF UPPER EXTREMITIES: Primary | ICD-10-CM

## 2023-12-21 DIAGNOSIS — E78.2 MIXED HYPERLIPIDEMIA: ICD-10-CM

## 2023-12-21 DIAGNOSIS — E11.9 TYPE 2 DIABETES MELLITUS WITHOUT COMPLICATION, WITH LONG-TERM CURRENT USE OF INSULIN: ICD-10-CM

## 2023-12-21 DIAGNOSIS — K21.9 GASTROESOPHAGEAL REFLUX DISEASE, UNSPECIFIED WHETHER ESOPHAGITIS PRESENT: ICD-10-CM

## 2023-12-21 DIAGNOSIS — I73.9 PERIPHERAL VASCULAR DISEASE, UNSPECIFIED: ICD-10-CM

## 2023-12-21 DIAGNOSIS — F17.210 CONTINUOUS DEPENDENCE ON CIGARETTE SMOKING: Chronic | ICD-10-CM

## 2023-12-21 PROCEDURE — 2023F DILAT RTA XM W/O RTNOPTHY: CPT | Mod: CPTII,,, | Performed by: STUDENT IN AN ORGANIZED HEALTH CARE EDUCATION/TRAINING PROGRAM

## 2023-12-21 PROCEDURE — 1160F RVW MEDS BY RX/DR IN RCRD: CPT | Mod: CPTII,,, | Performed by: STUDENT IN AN ORGANIZED HEALTH CARE EDUCATION/TRAINING PROGRAM

## 2023-12-21 PROCEDURE — 99214 OFFICE O/P EST MOD 30 MIN: CPT | Mod: S$PBB,,, | Performed by: STUDENT IN AN ORGANIZED HEALTH CARE EDUCATION/TRAINING PROGRAM

## 2023-12-21 PROCEDURE — 4010F ACE/ARB THERAPY RXD/TAKEN: CPT | Mod: CPTII,,, | Performed by: STUDENT IN AN ORGANIZED HEALTH CARE EDUCATION/TRAINING PROGRAM

## 2023-12-21 PROCEDURE — 3061F PR NEG MICROALBUMINURIA RESULT DOCUMENTED/REVIEW: ICD-10-PCS | Mod: CPTII,,, | Performed by: STUDENT IN AN ORGANIZED HEALTH CARE EDUCATION/TRAINING PROGRAM

## 2023-12-21 PROCEDURE — 3074F SYST BP LT 130 MM HG: CPT | Mod: CPTII,,, | Performed by: STUDENT IN AN ORGANIZED HEALTH CARE EDUCATION/TRAINING PROGRAM

## 2023-12-21 PROCEDURE — 99999 PR PBB SHADOW E&M-EST. PATIENT-LVL V: CPT | Mod: PBBFAC,,, | Performed by: STUDENT IN AN ORGANIZED HEALTH CARE EDUCATION/TRAINING PROGRAM

## 2023-12-21 PROCEDURE — 1159F MED LIST DOCD IN RCRD: CPT | Mod: CPTII,,, | Performed by: STUDENT IN AN ORGANIZED HEALTH CARE EDUCATION/TRAINING PROGRAM

## 2023-12-21 PROCEDURE — 99215 OFFICE O/P EST HI 40 MIN: CPT | Mod: PBBFAC,PO | Performed by: STUDENT IN AN ORGANIZED HEALTH CARE EDUCATION/TRAINING PROGRAM

## 2023-12-21 PROCEDURE — 3066F PR DOCUMENTATION OF TREATMENT FOR NEPHROPATHY: ICD-10-PCS | Mod: CPTII,,, | Performed by: STUDENT IN AN ORGANIZED HEALTH CARE EDUCATION/TRAINING PROGRAM

## 2023-12-21 PROCEDURE — 3066F NEPHROPATHY DOC TX: CPT | Mod: CPTII,,, | Performed by: STUDENT IN AN ORGANIZED HEALTH CARE EDUCATION/TRAINING PROGRAM

## 2023-12-21 PROCEDURE — 2023F PR DILATED RETINAL EXAM W/O EVID OF RETINOPATHY: ICD-10-PCS | Mod: CPTII,,, | Performed by: STUDENT IN AN ORGANIZED HEALTH CARE EDUCATION/TRAINING PROGRAM

## 2023-12-21 PROCEDURE — 99214 PR OFFICE/OUTPT VISIT, EST, LEVL IV, 30-39 MIN: ICD-10-PCS | Mod: S$PBB,,, | Performed by: STUDENT IN AN ORGANIZED HEALTH CARE EDUCATION/TRAINING PROGRAM

## 2023-12-21 PROCEDURE — 3044F HG A1C LEVEL LT 7.0%: CPT | Mod: CPTII,,, | Performed by: STUDENT IN AN ORGANIZED HEALTH CARE EDUCATION/TRAINING PROGRAM

## 2023-12-21 PROCEDURE — 3078F PR MOST RECENT DIASTOLIC BLOOD PRESSURE < 80 MM HG: ICD-10-PCS | Mod: CPTII,,, | Performed by: STUDENT IN AN ORGANIZED HEALTH CARE EDUCATION/TRAINING PROGRAM

## 2023-12-21 PROCEDURE — 4010F PR ACE/ARB THEARPY RXD/TAKEN: ICD-10-PCS | Mod: CPTII,,, | Performed by: STUDENT IN AN ORGANIZED HEALTH CARE EDUCATION/TRAINING PROGRAM

## 2023-12-21 PROCEDURE — 99999 PR PBB SHADOW E&M-EST. PATIENT-LVL V: ICD-10-PCS | Mod: PBBFAC,,, | Performed by: STUDENT IN AN ORGANIZED HEALTH CARE EDUCATION/TRAINING PROGRAM

## 2023-12-21 PROCEDURE — 1159F PR MEDICATION LIST DOCUMENTED IN MEDICAL RECORD: ICD-10-PCS | Mod: CPTII,,, | Performed by: STUDENT IN AN ORGANIZED HEALTH CARE EDUCATION/TRAINING PROGRAM

## 2023-12-21 PROCEDURE — 1160F PR REVIEW ALL MEDS BY PRESCRIBER/CLIN PHARMACIST DOCUMENTED: ICD-10-PCS | Mod: CPTII,,, | Performed by: STUDENT IN AN ORGANIZED HEALTH CARE EDUCATION/TRAINING PROGRAM

## 2023-12-21 PROCEDURE — 3044F PR MOST RECENT HEMOGLOBIN A1C LEVEL <7.0%: ICD-10-PCS | Mod: CPTII,,, | Performed by: STUDENT IN AN ORGANIZED HEALTH CARE EDUCATION/TRAINING PROGRAM

## 2023-12-21 PROCEDURE — 3074F PR MOST RECENT SYSTOLIC BLOOD PRESSURE < 130 MM HG: ICD-10-PCS | Mod: CPTII,,, | Performed by: STUDENT IN AN ORGANIZED HEALTH CARE EDUCATION/TRAINING PROGRAM

## 2023-12-21 PROCEDURE — 3008F BODY MASS INDEX DOCD: CPT | Mod: CPTII,,, | Performed by: STUDENT IN AN ORGANIZED HEALTH CARE EDUCATION/TRAINING PROGRAM

## 2023-12-21 PROCEDURE — 3078F DIAST BP <80 MM HG: CPT | Mod: CPTII,,, | Performed by: STUDENT IN AN ORGANIZED HEALTH CARE EDUCATION/TRAINING PROGRAM

## 2023-12-21 PROCEDURE — 3008F PR BODY MASS INDEX (BMI) DOCUMENTED: ICD-10-PCS | Mod: CPTII,,, | Performed by: STUDENT IN AN ORGANIZED HEALTH CARE EDUCATION/TRAINING PROGRAM

## 2023-12-21 PROCEDURE — 3061F NEG MICROALBUMINURIA REV: CPT | Mod: CPTII,,, | Performed by: STUDENT IN AN ORGANIZED HEALTH CARE EDUCATION/TRAINING PROGRAM

## 2023-12-21 NOTE — ASSESSMENT & PLAN NOTE
Lab Results   Component Value Date    WBC 7.01 10/04/2023    HGB 13.6 (L) 10/04/2023    HCT 42.7 10/04/2023    MCV 91 10/04/2023     10/04/2023

## 2023-12-21 NOTE — PROGRESS NOTES
[facility] Note    HPI:  Jayden Canada Jr. is a 58 y.o. male who presents today for Health Maintenance (6 month follow up)    58 year old with hypertension, hyperlipidemia, vitamin-D deficiency, anemia, diabetes mellitus type 2, GERD, ? lupus, chronic kidney disease stage 3B, peripheral artery disease for follow up BP check.   BP controlled. Continues to complain leg pain, originally started in left leg now affecting the right leg.   LE sonogram study findings of mid to distal left superficial femoral artery.   Patient has been staying physically active, walking as tolerated, but currently a few steps and his legs begin to hurt.    Denies fever, chills, vision changes, dizziness, ear, eye pain, excessive headache, chest pain, palpitations, shortness of breath, abdominal pain, nausea, vomiting, voiding or stooling problems.      Care coordination:  Cardiology, Dr. Aguayo  Endocrinology, Dr. Cortez  Neurology, Dr. Garcia  Nephrology, Dr. Yung  [facility]. Dr. Sharma, appointment 5/2/24  Podiatry, Dr. Price 4/30/24 diabetic foot    ROS   A review of systems was performed and was negative except as noted above.    I personally reviewed allergies, past medical, surgical, social and family history and updated as appropriate.    Medications:    Current Outpatient Medications:     amitriptyline (ELAVIL) 100 MG tablet, Take 1 tablet (100 mg total) by mouth every evening., Disp: 30 tablet, Rfl: 5    aspirin (ECOTRIN) 81 MG EC tablet, Take 81 mg by mouth once daily. NOTIFIED DR. GAINES OFFICE, Disp: , Rfl:     carvediloL (COREG) 12.5 MG tablet, Take 12.5 mg by mouth 2 (two) times daily., Disp: , Rfl:     chlorthalidone (HYGROTEN) 25 MG Tab, Take 0.5 tablets (12.5 mg total) by mouth once daily., Disp: 15 tablet, Rfl: 5    cilostazol (PLETAL) 100 MG Tab, , Disp: , Rfl:     clopidogreL (PLAVIX) 75 mg tablet, Take 75 mg by mouth once daily. NOTIFIED DR. GAINES OFFICE, Disp: , Rfl:      dorzolamide-timolol 2-0.5% (COSOPT) 22.3-6.8 mg/mL ophthalmic solution, SMARTSI Drop(s) In Eye(s) Every 12 Hours, Disp: , Rfl:     ferrous sulfate 325 (65 FE) MG EC tablet, Take 1 tablet (325 mg total) by mouth once daily., Disp: 30 tablet, Rfl: 5    gabapentin (NEURONTIN) 800 MG tablet, Take 0.5 tablets (400 mg total) by mouth 3 (three) times daily., Disp: 60 tablet, Rfl: 5    JARDIANCE 25 mg tablet, Take 1 tablet (25 mg total) by mouth once daily., Disp: 30 tablet, Rfl: 11    latanoprost 0.005 % ophthalmic solution, , Disp: , Rfl:     omeprazole (PRILOSEC) 20 MG capsule, Take 20 mg by mouth every morning., Disp: , Rfl:     rosuvastatin (CRESTOR) 40 MG Tab, Take 1 tablet (40 mg total) by mouth every evening., Disp: 90 tablet, Rfl: 3    STIOLTO RESPIMAT 2.5-2.5 mcg/actuation Mist, SMARTSI Puff(s) By Mouth Daily, Disp: , Rfl:     traZODone (DESYREL) 50 MG tablet, Take 0.5 tablets (25 mg total) by mouth every evening., Disp: 30 tablet, Rfl: 2    valsartan (DIOVAN) 160 MG tablet, Take 1 tablet by mouth once daily, Disp: 90 tablet, Rfl: 3    albuterol (PROVENTIL/VENTOLIN HFA) 90 mcg/actuation inhaler, Inhale 1-2 puffs into the lungs every 6 (six) hours as needed. Rescue, Disp: 18 g, Rfl: 0     Health Maintenance:  Immunization History   Administered Date(s) Administered    COVID-19 Vaccine 2021, 2021    COVID-19, MRNA, LN-S, PF (MODERNA FULL 0.5 ML DOSE) 2021, 2021    DTP 1966, 1966, 1966, 1968, 10/28/1969, 2010    Hepatitis B, Pediatric/Adolescent 02/10/2012    IPV 1966, 1967, 1967, 10/28/1969    Influenza 10/12/2016, 2017, 2023    Influenza (FLUBLOK) - Quadrivalent - Recombinant - PF *Preferred* (egg allergy) 10/18/2023    Influenza - Quadrivalent - PF *Preferred* (6 months and older) 10/12/2016, 2017, 10/04/2018, 10/03/2019, 10/13/2021, 2022, 2023    Influenza - Trivalent (ADULT) 2020    Influenza -  "Trivalent - PF (ADULT) 01/06/2022, 01/19/2023    MMR 02/23/1966, 11/29/1967, 02/18/1971, 08/15/1973    Pneumococcal Conjugate - 13 Valent 11/25/2019    Pneumococcal Conjugate - 20 Valent 08/16/2022    Pneumococcal Polysaccharide - 23 Valent 10/26/2018      Health Maintenance   Topic Date Due    Foot Exam  Never done    TETANUS VACCINE  Never done    Shingles Vaccine (1 of 2) Never done    LDCT Lung Screen  02/10/2024    Eye Exam  05/02/2024    Hemoglobin A1c  06/14/2024    Lipid Panel  12/14/2024    High Dose Statin  12/21/2024    Colorectal Cancer Screening  09/12/2025    Hepatitis C Screening  Completed     Health Maintenance Topics with due status: Not Due       Topic Last Completion Date    Colorectal Cancer Screening 09/12/2022    LDCT Lung Screen 02/10/2023    Eye Exam 05/02/2023    Diabetes Urine Screening 12/14/2023    Lipid Panel 12/14/2023    Hemoglobin A1c 12/14/2023    High Dose Statin 12/21/2023     Health Maintenance Due   Topic Date Due    Foot Exam  Never done    HIV Screening  Never done    TETANUS VACCINE  Never done    Shingles Vaccine (1 of 2) Never done    COVID-19 Vaccine (5 - 2023-24 season) 09/01/2023     PHYSICAL EXAM:  Vitals:    12/21/23 0844   BP: (!) 123/58   BP Location: Left arm   Patient Position: Sitting   BP Method: Large (Automatic)   Pulse: 79   Resp: 16   Temp: 98 °F (36.7 °C)   SpO2: 98%   Weight: 98.4 kg (217 lb)   Height: 5' 11" (1.803 m)     Body mass index is 30.27 kg/m².  Physical Exam  Vitals and nursing note reviewed.   Constitutional:       General: He is not in acute distress.     Appearance: Normal appearance. He is not ill-appearing or toxic-appearing.   HENT:      Head: Normocephalic and atraumatic.      Right Ear: External ear normal.      Left Ear: External ear normal.      Nose: Nose normal.      Mouth/Throat:      Mouth: Mucous membranes are dry.      Pharynx: Oropharynx is clear.   Eyes:      Extraocular Movements: Extraocular movements intact.      " Conjunctiva/sclera: Conjunctivae normal.   Cardiovascular:      Rate and Rhythm: Normal rate and regular rhythm.      Pulses: Normal pulses.      Heart sounds: Normal heart sounds. No murmur heard.  Pulmonary:      Effort: Pulmonary effort is normal. No respiratory distress.      Breath sounds: Normal breath sounds. No wheezing or rales.   Abdominal:      General: Abdomen is flat. Bowel sounds are normal. There is no distension.      Palpations: Abdomen is soft. There is no mass.      Tenderness: There is no abdominal tenderness. There is no right CVA tenderness, left CVA tenderness or guarding.   Musculoskeletal:         General: No swelling or tenderness.      Cervical back: Normal range of motion and neck supple. No rigidity.      Right lower leg: No edema.      Left lower leg: No edema.      Comments: No bony tenderness over cervical, thoracic, lumbar spine   Skin:     General: Skin is warm and dry.      Findings: No lesion or rash.   Neurological:      General: No focal deficit present.      Mental Status: He is alert and oriented to person, place, and time. Mental status is at baseline.      Cranial Nerves: No cranial nerve deficit.      Motor: No weakness.      Gait: Gait normal.   Psychiatric:         Mood and Affect: Mood normal.         Behavior: Behavior normal.        ASSESSMENT/PLAN:  1. Bilateral neuropathy of upper extremities  Assessment & Plan:  Have right arm ulnar nerve injury.   [place] [facility] [place]  Per patient MRI scheduled.   - [place] [facility]  - continue Gabapentin 400 mg TID   - continue amitriptyline 100 mg QHS   - f/u neurology      2. Glaucoma, unspecified glaucoma type, unspecified laterality  Assessment & Plan:  Take two forms of eye drops. Timolol and latanoprost.       3. Primary hypertension  Overview:  11/3  - carvedilol 12.5 mg BID  - valsartan 160 mg daily  - chlorthalidone 12.5 mg daily  - maintain daily adequate hydration with at least 1.5 to 2 L water        4. PAD (peripheral artery disease)  Assessment & Plan:  8/16/23 Sonogram bilateral legs, significant stenosis involving the mid to distal left superficial femoral artery  Claudication pain always in left leg pain, but right leg started to hurt over the past several months.  Right ankle injured in 2010 hit by drill pipe with multiple fractures, s/p harware per patient.   - taking daily aspirin and plavix, statin  - pletal on board  - f/u CTA runoff study  - f/u referral vascular specialist        5. Mixed hyperlipidemia  Assessment & Plan:  Continue with daily statin.       6. Stage 3b chronic kidney disease    7. Anemia, unspecified type  Assessment & Plan:  Lab Results   Component Value Date    WBC 7.01 10/04/2023    HGB 13.6 (L) 10/04/2023    HCT 42.7 10/04/2023    MCV 91 10/04/2023     10/04/2023         Orders:  -     Iron and TIBC; Future; Expected date: 12/21/2023  -     Ferritin; Future; Expected date: 12/21/2023  -     Vitamin B12; Future; Expected date: 12/21/2023    8. Type 2 diabetes mellitus without complication, with long-term current use of insulin  Assessment & Plan:  Patient recently stopped taking all his antihyperglycemics secondary to renal function decline.   Lab Results   Component Value Date    HGBA1C 6.4 (H) 12/14/2023   - f/u endocrinology      9. Class 1 obesity with serious comorbidity and body mass index (BMI) of 30.0 to 30.9 in adult, unspecified obesity type  Overview:  Wt Readings from Last 8 Encounters:   12/21/23 98.4 kg (217 lb)   11/03/23 98 kg (216 lb)   10/03/23 98.2 kg (216 lb 6.4 oz)   06/21/23 98.4 kg (217 lb)   05/26/23 101.2 kg (223 lb)   04/27/23 100.2 kg (221 lb)   03/28/23 102.4 kg (225 lb 12.8 oz)   02/27/23 105.2 kg (232 lb)            10. Gastroesophageal reflux disease, unspecified whether esophagitis present    11. Colon cancer screening  Assessment & Plan:  9/12/22 Cologuard negative, denies blood in stool or changes in caliber, color.  12/21/23      12.  Sleeping difficulties  Assessment & Plan:  Per patient over 2 decades he has not slept well at bedtime. Currently taking elavil 100 mg which helps him get some rest.   Reviewed at length sleep hygiene practices to incorporate at bedtime.  - go to bed when sleepy   - no TV watching in bed  - if you do not fall asleep within 20-30 minutes at the beginning of the night or after awakening, then get out of bed  -  a thick boring book to read   - avoid any naps during the day  - take a warm bath or shower before bed and allow body temperature to drop to signal brain to prepare for sleep  - avoid caffeine, nicotine products, alcohol, large meals within two hours of bedtime             13. Continuous dependence on cigarette smoking  Assessment & Plan:  Not interested in smoking cessation.   Smoking couple cigarettes per day. Smoking since age 12. Maximum one pack per day for >30 years history.   - scheduled LDCT scan of lungs for baseline study    Orders:  -     Cancel: CT Chest Lung Screening Low Dose; Future; Expected date: 12/21/2023    14. Peripheral vascular disease, unspecified  -     Ambulatory referral/consult to Vascular Surgery; Future; Expected date: 12/28/2023  -     CTA Runoff ABD Pel Bilat Lower EXT; Future; Expected date: 12/21/2023    15. Bilateral claudication of lower limb  Assessment & Plan:  LE US findings of significant stenosis involving the mid to distal left superficial femoral artery.  - continue plavix and aspirin  - continue daily statin  - f/u CTA runoff  - f/u referral vascular specialist      Narrative & Impression    EXAMINATION:  ARTERIAL ULTRASOUND BILATERAL LOWER EXTREMITY     CLINICAL HISTORY:  Peripheral vascular disease, unspecified     TECHNIQUE:  Assessment of the right and left lower extremity arterial structures was performed with color flow and spectral Doppler analysis.     COMPARISON:  None     FINDINGS:  Mostly triphasic and biphasic waveforms are noted within the arteries  of both lower extremities.     Peak velocity of the right common femoral artery is 124 cm/sec and the left 119 cm/sec.     Peak velocity in the right proximal superficial femoral artery is 113 cm/sec, the mid superficial femoral artery 134 cm/sec and the distal superficial femoral artery 118 cm/sec.     Peak velocity in the left proximal superficial femoral artery is 144 cm/sec, the mid superficial femoral artery 69 cm/sec and the distal superficial femoral artery 157 cm/sec.     Peak velocity in the right popliteal artery is 77 cm/sec and on the left 129 cm/sec.     Peak velocity in the right posterior tibial artery is 76 cm/sec and on the left 44 cm/sec.     Peak velocity in the right anterior tibial artery is 38 cm/sec and on the left 74 cm/sec.     Impression:     Elevated velocities in the distal left superficial femoral artery compared to the mid left superficial femoral artery concerning for focal hemodynamically significant stenosis involving the mid to distal left superficial femoral artery.        Electronically signed by: Magui Mi MD  Date:                                            08/16/2023  Time:                                           15:08     US Retroperitoneal Complete  Narrative: EXAMINATION:  US RETROPERITONEAL COMPLETE    CLINICAL HISTORY:  Chronic kidney disease, stage 3a    COMPARISON:  None.    FINDINGS:  The right kidney measures 10.5 cm in length.  The left kidney measures 11.8 cm in length.  There is no evidence of renal mass or hydronephrosis.  The bladder is unremarkable.  Impression: Normal renal ultrasound.    Electronically signed by: Anton Mace MD  Date:    09/27/2023  Time:    11:53     Other than changes above, continue current medications and maintain follow up with specialists.      No follow-ups on file.   Recent Results (from the past 2016 hour(s))   Protein / creatinine ratio, urine    Collection Time: 10/04/23  2:11 PM   Result Value Ref Range    Protein, Urine  Random 10.7 0.0 - 15.0 mg/dL    Creatinine, Urine 104.0 23.0 - 375.0 mg/dL    Prot/Creat Ratio, Urine 0.10 0.00 - 0.20   Renal Function Panel    Collection Time: 10/04/23  2:18 PM   Result Value Ref Range    Glucose 122 (H) 70 - 110 mg/dL    Sodium 138 136 - 145 mmol/L    Potassium 3.5 3.5 - 5.1 mmol/L    Chloride 107 95 - 110 mmol/L    CO2 27 23 - 29 mmol/L    BUN 17 6 - 20 mg/dL    Calcium 9.0 8.7 - 10.5 mg/dL    Creatinine 1.4 0.5 - 1.4 mg/dL    Albumin 3.7 3.5 - 5.2 g/dL    Phosphorus 4.0 2.7 - 4.5 mg/dL    eGFR 57.9 (A) >60 mL/min/1.73 m^2    Anion Gap 4 3 - 11 mmol/L   CBC Auto Differential    Collection Time: 10/04/23  2:18 PM   Result Value Ref Range    WBC 7.01 3.90 - 12.70 K/uL    RBC 4.71 4.60 - 6.20 M/uL    Hemoglobin 13.6 (L) 14.0 - 18.0 g/dL    Hematocrit 42.7 40.0 - 54.0 %    MCV 91 82 - 98 fL    MCH 28.9 27.0 - 31.0 pg    MCHC 31.9 (L) 32.0 - 36.0 g/dL    RDW 14.2 11.5 - 14.5 %    Platelets 302 150 - 450 K/uL    MPV 9.8 9.2 - 12.9 fL    Immature Granulocytes 0.1 0.0 - 0.5 %    Gran # (ANC) 4.7 1.8 - 7.7 K/uL    Immature Grans (Abs) 0.01 0.00 - 0.04 K/uL    Lymph # 1.3 1.0 - 4.8 K/uL    Mono # 0.7 0.3 - 1.0 K/uL    Eos # 0.2 0.0 - 0.5 K/uL    Baso # 0.05 0.00 - 0.20 K/uL    nRBC 0 0 /100 WBC    Gran % 67.2 38.0 - 73.0 %    Lymph % 18.3 18.0 - 48.0 %    Mono % 10.3 4.0 - 15.0 %    Eosinophil % 3.4 0.0 - 8.0 %    Basophil % 0.7 0.0 - 1.9 %    Differential Method Automated    PTH, intact    Collection Time: 10/04/23  2:18 PM   Result Value Ref Range    PTH, Intact 38.9 9.0 - 77.0 pg/mL   Microalbumin/creatinine urine ratio    Collection Time: 12/14/23 11:18 AM   Result Value Ref Range    Microalbumin, Urine <5.0 mg/L    Creatinine, Urine 108.0 23.0 - 375.0 mg/dL    Microalb/Creat Ratio Unable to calculate 0.0 - 30.0 ug/mg   Lipid panel    Collection Time: 12/14/23 11:25 AM   Result Value Ref Range    Cholesterol 134 120 - 199 mg/dL    Triglycerides 138 30 - 150 mg/dL    HDL 32 (L) 40 - 75 mg/dL    LDL  Cholesterol 74.4 63.0 - 159.0 mg/dL    HDL/Cholesterol Ratio 23.9 20.0 - 50.0 %    Total Cholesterol/HDL Ratio 4.2 2.0 - 5.0    Non-HDL Cholesterol 102 mg/dL   Hemoglobin A1c    Collection Time: 12/14/23 11:25 AM   Result Value Ref Range    Hemoglobin A1C 6.4 (H) 4.0 - 5.6 %    Estimated Avg Glucose 137 (H) 68 - 131 mg/dL   TSH    Collection Time: 12/14/23 11:25 AM   Result Value Ref Range    TSH 1.580 0.400 - 4.000 uIU/mL         Marge Menjivar DO  [facility]

## 2023-12-21 NOTE — ASSESSMENT & PLAN NOTE
Not interested in smoking cessation.   Smoking couple cigarettes per day. Smoking since age 12. Maximum one pack per day for >30 years history.   - scheduled LDCT scan of lungs for baseline study

## 2023-12-21 NOTE — ASSESSMENT & PLAN NOTE
Have right arm ulnar nerve injury.   Following [facility] [place].  Per patient MRI scheduled.   - follow up notes from [facility]  - continue Gabapentin 400 mg TID   - continue amitriptyline 100 mg QHS   - f/u neurology

## 2023-12-21 NOTE — ASSESSMENT & PLAN NOTE
8/16/23 Sonogram bilateral legs, significant stenosis involving the mid to distal left superficial femoral artery  Claudication pain always in left leg pain, but right leg started to hurt over the past several months.  Right ankle injured in 2010 hit by drill pipe with multiple fractures, s/p harware per patient.   - taking daily aspirin and plavix, statin  - pletal on board  - f/u CTA runoff study  - f/u referral vascular specialist

## 2023-12-21 NOTE — ASSESSMENT & PLAN NOTE
Patient recently stopped taking all his antihyperglycemics secondary to renal function decline.   Lab Results   Component Value Date    HGBA1C 6.4 (H) 12/14/2023   - f/u endocrinology

## 2023-12-22 NOTE — ASSESSMENT & PLAN NOTE
LE US findings of significant stenosis involving the mid to distal left superficial femoral artery.  - continue plavix and aspirin  - continue daily statin  - f/u CTA runoff  - f/u referral vascular specialist   [[email or web address]] : 58 y/o female presents stating she has a sinus infection c/o sinus pressure and occ sharp right ear pain that is worse later in the day, does not wake with ear pain. Started with sore throat several days ago which has resolved. Denies nose stuffiness, cough, h/a. Usually has allergy symptoms from spring through fall. Has not taken any meds, "don't like to take meds".

## 2023-12-29 ENCOUNTER — TELEPHONE (OUTPATIENT)
Dept: PRIMARY CARE CLINIC | Facility: CLINIC | Age: 58
End: 2023-12-29
Payer: MEDICAID

## 2024-04-01 DIAGNOSIS — G47.00 INSOMNIA, UNSPECIFIED TYPE: Primary | ICD-10-CM

## 2024-04-01 RX ORDER — TRAZODONE HYDROCHLORIDE 50 MG/1
TABLET ORAL
Qty: 30 TABLET | Refills: 0 | Status: SHIPPED | OUTPATIENT
Start: 2024-04-01 | End: 2024-05-01 | Stop reason: ALTCHOICE

## 2024-05-01 ENCOUNTER — OFFICE VISIT (OUTPATIENT)
Dept: PRIMARY CARE CLINIC | Facility: CLINIC | Age: 59
End: 2024-05-01
Payer: MEDICAID

## 2024-05-01 VITALS
DIASTOLIC BLOOD PRESSURE: 55 MMHG | SYSTOLIC BLOOD PRESSURE: 103 MMHG | WEIGHT: 209 LBS | HEART RATE: 76 BPM | OXYGEN SATURATION: 98 % | RESPIRATION RATE: 18 BRPM | HEIGHT: 71 IN | BODY MASS INDEX: 29.26 KG/M2

## 2024-05-01 DIAGNOSIS — Z12.5 SCREENING PSA (PROSTATE SPECIFIC ANTIGEN): ICD-10-CM

## 2024-05-01 DIAGNOSIS — I10 PRIMARY HYPERTENSION: ICD-10-CM

## 2024-05-01 DIAGNOSIS — G47.00 INSOMNIA, UNSPECIFIED TYPE: ICD-10-CM

## 2024-05-01 DIAGNOSIS — E78.2 MIXED HYPERLIPIDEMIA: ICD-10-CM

## 2024-05-01 DIAGNOSIS — K21.9 GASTROESOPHAGEAL REFLUX DISEASE, UNSPECIFIED WHETHER ESOPHAGITIS PRESENT: ICD-10-CM

## 2024-05-01 DIAGNOSIS — F17.210 CONTINUOUS DEPENDENCE ON CIGARETTE SMOKING: Chronic | ICD-10-CM

## 2024-05-01 DIAGNOSIS — Z91.199 COMPLIANCE POOR: ICD-10-CM

## 2024-05-01 DIAGNOSIS — I73.9 PERIPHERAL VASCULAR DISEASE, UNSPECIFIED: ICD-10-CM

## 2024-05-01 DIAGNOSIS — E11.65 UNCONTROLLED TYPE 2 DIABETES MELLITUS WITH HYPERGLYCEMIA: Primary | ICD-10-CM

## 2024-05-01 DIAGNOSIS — E66.9 CLASS 1 OBESITY WITH SERIOUS COMORBIDITY AND BODY MASS INDEX (BMI) OF 30.0 TO 30.9 IN ADULT, UNSPECIFIED OBESITY TYPE: ICD-10-CM

## 2024-05-01 DIAGNOSIS — G47.9 SLEEPING DIFFICULTIES: ICD-10-CM

## 2024-05-01 LAB
ALBUMIN SERPL BCP-MCNC: 3.6 G/DL (ref 3.5–5.2)
ALP SERPL-CCNC: 142 U/L (ref 55–135)
ALT SERPL W/O P-5'-P-CCNC: 31 U/L (ref 10–44)
ANION GAP SERPL CALC-SCNC: 9 MMOL/L (ref 3–11)
AST SERPL-CCNC: 20 U/L (ref 10–40)
BILIRUB SERPL-MCNC: 0.7 MG/DL (ref 0.1–1)
BUN SERPL-MCNC: 25 MG/DL (ref 6–20)
CALCIUM SERPL-MCNC: 8.8 MG/DL (ref 8.7–10.5)
CHLORIDE SERPL-SCNC: 93 MMOL/L (ref 95–110)
CHOLEST SERPL-MCNC: 129 MG/DL (ref 120–199)
CHOLEST/HDLC SERPL: 4.8 {RATIO} (ref 2–5)
CO2 SERPL-SCNC: 27 MMOL/L (ref 23–29)
COMPLEXED PSA SERPL-MCNC: 0.6 NG/ML (ref 0–4)
CREAT SERPL-MCNC: 1.8 MG/DL (ref 0.5–1.4)
EST. GFR  (NO RACE VARIABLE): 42.6 ML/MIN/1.73 M^2
ESTIMATED AVG GLUCOSE: 283 MG/DL (ref 68–131)
FERRITIN SERPL-MCNC: 357 NG/ML (ref 20–300)
GLUCOSE SERPL-MCNC: 658 MG/DL (ref 70–110)
HBA1C MFR BLD: 11.5 % (ref 4–5.6)
HDLC SERPL-MCNC: 27 MG/DL (ref 40–75)
HDLC SERPL: 20.9 % (ref 20–50)
IRON SATN MFR SERPL: 27 % (ref 20–50)
IRON SERPL-MCNC: 75 UG/DL (ref 45–160)
LDLC SERPL CALC-MCNC: 34.6 MG/DL (ref 63–159)
NONHDLC SERPL-MCNC: 102 MG/DL
POTASSIUM SERPL-SCNC: 3.9 MMOL/L (ref 3.5–5.1)
PROT SERPL-MCNC: 7.9 G/DL (ref 6–8.4)
SODIUM SERPL-SCNC: 129 MMOL/L (ref 136–145)
TOTAL IRON BINDING CAPACITY: 278 UG/DL (ref 250–450)
TRIGL SERPL-MCNC: 337 MG/DL (ref 30–150)
TSH SERPL DL<=0.005 MIU/L-ACNC: 0.87 UIU/ML (ref 0.4–4)
VIT B12 SERPL-MCNC: 590 PG/ML (ref 210–950)

## 2024-05-01 PROCEDURE — 80053 COMPREHEN METABOLIC PANEL: CPT | Performed by: STUDENT IN AN ORGANIZED HEALTH CARE EDUCATION/TRAINING PROGRAM

## 2024-05-01 PROCEDURE — 80061 LIPID PANEL: CPT | Performed by: STUDENT IN AN ORGANIZED HEALTH CARE EDUCATION/TRAINING PROGRAM

## 2024-05-01 PROCEDURE — 3008F BODY MASS INDEX DOCD: CPT | Mod: CPTII,,, | Performed by: STUDENT IN AN ORGANIZED HEALTH CARE EDUCATION/TRAINING PROGRAM

## 2024-05-01 PROCEDURE — 82728 ASSAY OF FERRITIN: CPT | Performed by: STUDENT IN AN ORGANIZED HEALTH CARE EDUCATION/TRAINING PROGRAM

## 2024-05-01 PROCEDURE — 84443 ASSAY THYROID STIM HORMONE: CPT | Performed by: STUDENT IN AN ORGANIZED HEALTH CARE EDUCATION/TRAINING PROGRAM

## 2024-05-01 PROCEDURE — 3074F SYST BP LT 130 MM HG: CPT | Mod: CPTII,,, | Performed by: STUDENT IN AN ORGANIZED HEALTH CARE EDUCATION/TRAINING PROGRAM

## 2024-05-01 PROCEDURE — 83036 HEMOGLOBIN GLYCOSYLATED A1C: CPT | Performed by: STUDENT IN AN ORGANIZED HEALTH CARE EDUCATION/TRAINING PROGRAM

## 2024-05-01 PROCEDURE — 82607 VITAMIN B-12: CPT | Performed by: STUDENT IN AN ORGANIZED HEALTH CARE EDUCATION/TRAINING PROGRAM

## 2024-05-01 PROCEDURE — 3078F DIAST BP <80 MM HG: CPT | Mod: CPTII,,, | Performed by: STUDENT IN AN ORGANIZED HEALTH CARE EDUCATION/TRAINING PROGRAM

## 2024-05-01 PROCEDURE — 99215 OFFICE O/P EST HI 40 MIN: CPT | Mod: PBBFAC | Performed by: STUDENT IN AN ORGANIZED HEALTH CARE EDUCATION/TRAINING PROGRAM

## 2024-05-01 PROCEDURE — 83540 ASSAY OF IRON: CPT | Performed by: STUDENT IN AN ORGANIZED HEALTH CARE EDUCATION/TRAINING PROGRAM

## 2024-05-01 PROCEDURE — 82306 VITAMIN D 25 HYDROXY: CPT | Performed by: STUDENT IN AN ORGANIZED HEALTH CARE EDUCATION/TRAINING PROGRAM

## 2024-05-01 PROCEDURE — 4010F ACE/ARB THERAPY RXD/TAKEN: CPT | Mod: CPTII,,, | Performed by: STUDENT IN AN ORGANIZED HEALTH CARE EDUCATION/TRAINING PROGRAM

## 2024-05-01 PROCEDURE — 84153 ASSAY OF PSA TOTAL: CPT | Performed by: STUDENT IN AN ORGANIZED HEALTH CARE EDUCATION/TRAINING PROGRAM

## 2024-05-01 PROCEDURE — 99999 PR PBB SHADOW E&M-EST. PATIENT-LVL V: CPT | Mod: PBBFAC,,, | Performed by: STUDENT IN AN ORGANIZED HEALTH CARE EDUCATION/TRAINING PROGRAM

## 2024-05-01 PROCEDURE — 99214 OFFICE O/P EST MOD 30 MIN: CPT | Mod: S$PBB,,, | Performed by: STUDENT IN AN ORGANIZED HEALTH CARE EDUCATION/TRAINING PROGRAM

## 2024-05-01 PROCEDURE — 1159F MED LIST DOCD IN RCRD: CPT | Mod: CPTII,,, | Performed by: STUDENT IN AN ORGANIZED HEALTH CARE EDUCATION/TRAINING PROGRAM

## 2024-05-01 PROCEDURE — 3066F NEPHROPATHY DOC TX: CPT | Mod: CPTII,,, | Performed by: STUDENT IN AN ORGANIZED HEALTH CARE EDUCATION/TRAINING PROGRAM

## 2024-05-01 PROCEDURE — 1160F RVW MEDS BY RX/DR IN RCRD: CPT | Mod: CPTII,,, | Performed by: STUDENT IN AN ORGANIZED HEALTH CARE EDUCATION/TRAINING PROGRAM

## 2024-05-01 RX ORDER — METFORMIN HYDROCHLORIDE 500 MG/1
1000 TABLET, EXTENDED RELEASE ORAL 2 TIMES DAILY WITH MEALS
Qty: 120 TABLET | Refills: 11 | Status: SHIPPED | OUTPATIENT
Start: 2024-05-01

## 2024-05-01 RX ORDER — INSULIN DETEMIR 100 [IU]/ML
20 INJECTION, SOLUTION SUBCUTANEOUS DAILY
Qty: 6 ML | Refills: 2 | Status: SHIPPED | OUTPATIENT
Start: 2024-05-01

## 2024-05-01 RX ORDER — FERROUS SULFATE TAB 325 MG (65 MG ELEMENTAL FE) 325 (65 FE) MG
TAB ORAL
COMMUNITY
Start: 2024-03-24

## 2024-05-01 RX ORDER — QUETIAPINE FUMARATE 100 MG/1
100 TABLET, FILM COATED ORAL NIGHTLY
Qty: 30 TABLET | Refills: 1 | Status: SHIPPED | OUTPATIENT
Start: 2024-05-01 | End: 2024-05-02

## 2024-05-01 RX ORDER — QUETIAPINE FUMARATE 100 MG/1
100 TABLET, FILM COATED ORAL NIGHTLY PRN
Qty: 30 TABLET | Refills: 0 | Status: SHIPPED | OUTPATIENT
Start: 2024-05-01 | End: 2024-05-03

## 2024-05-01 NOTE — ASSESSMENT & PLAN NOTE
Per patient over 2 decades he has not slept well at bedtime.   Elavil 100 mg which helps him get some rest, but no longer effective.   Added trazodone which again helped initially, but once again causing significant distress staying up until 8-9 AM in the morning before he starts falling asleep.   Denies SI/HI, mood disturbances, anxiety, increased agitation.   - discontinue trazodone and taper elavil and discontinue.   - start seroquel 100 mg QHS  - follow up one week  Reviewed at length sleep hygiene practices to incorporate at bedtime, patient not following below instructions  - go to bed when sleepy   - no TV watching in bed  - if you do not fall asleep within 20-30 minutes at the beginning of the night or after awakening, then get out of bed  -  a thick boring book to read   - avoid any naps during the day  - take a warm bath or shower before bed and allow body temperature to drop to signal brain to prepare for sleep  - avoid caffeine, nicotine products, alcohol, large meals within two hours of bedtime

## 2024-05-01 NOTE — PROGRESS NOTES
"[facility] Note    HPI:  Jayden Canada Jr. is a 59 y.o. male who presents today for Insomnia (Patient states he is having trouble with not getting sleep.  He states he has only slept "4 hours" this week.  )  59 year old with hypertension, hyperlipidemia, vitamin-D deficiency, anemia, diabetes mellitus type 2, GERD with history of esophagitis, lupus, chronic kidney disease stage 3B, peripheral artery disease for follow up BP check.     Patient with known atherosclerosis with continue claudications pain, no follow up studies with CTA abd, LE follow through. Patient states he has not followed up with his cardiologist in the past year.   - LE sonogram study findings of stenosis in mid to distal left superficial femoral artery.   Interim, he states his cholesterol medications have changed. He has been instructed to stop taking metformin and patient does not know why.   No recent notes or visit with his primary endocrinologist. Patient missed his recent appointment this past month without a follow up schedule.     Denies fever, chills, vision changes, dizziness, ear, excessive headache, chest pain, palpitations, shortness of breath, abdominal pain, nausea, vomiting, voiding or stooling problems.      Care coordination:  Cardiology, Dr. Aguayo  Endocrinology, Dr. oCrtez  Neurology, Dr. Garcia  Nephrology, Dr. Yung  [facility]. Dr. Sharma, appointment 5/2/24  Podiatry, Dr. Price 4/30/24 diabetic foot. Follow up notes.      ROS   A review of systems was performed and was negative except as noted above.    I personally reviewed allergies, past medical, surgical, social and family history and updated as appropriate.    Medications:    Current Outpatient Medications:     amitriptyline (ELAVIL) 100 MG tablet, Take 1 tablet (100 mg total) by mouth every evening., Disp: 30 tablet, Rfl: 5    aspirin (ECOTRIN) 81 MG EC tablet, Take 81 mg by mouth once daily. NOTIFIED DR. GAINES OFFICE, Disp: " , Rfl:     carvediloL (COREG) 12.5 MG tablet, Take 12.5 mg by mouth 2 (two) times daily., Disp: , Rfl:     chlorthalidone (HYGROTEN) 25 MG Tab, Take 0.5 tablets (12.5 mg total) by mouth once daily., Disp: 15 tablet, Rfl: 5    cilostazol (PLETAL) 100 MG Tab, , Disp: , Rfl:     clopidogreL (PLAVIX) 75 mg tablet, Take 75 mg by mouth once daily. NOTIFIED DR. GAINES OFFICE, Disp: , Rfl:     dorzolamide-timolol 2-0.5% (COSOPT) 22.3-6.8 mg/mL ophthalmic solution, SMARTSI Drop(s) In Eye(s) Every 12 Hours, Disp: , Rfl:     ezetimibe (ZETIA) 10 mg tablet, Take 1 tablet (10 mg total) by mouth once daily., Disp: 90 tablet, Rfl: 1    FEROSUL 325 mg (65 mg iron) Tab tablet, Take by mouth., Disp: , Rfl:     gabapentin (NEURONTIN) 800 MG tablet, Take 0.5 tablets (400 mg total) by mouth 3 (three) times daily., Disp: 60 tablet, Rfl: 5    JARDIANCE 25 mg tablet, Take 1 tablet (25 mg total) by mouth once daily., Disp: 30 tablet, Rfl: 11    latanoprost 0.005 % ophthalmic solution, , Disp: , Rfl:     rosuvastatin (CRESTOR) 40 MG Tab, Take 1 tablet (40 mg total) by mouth every evening., Disp: 90 tablet, Rfl: 3    valsartan (DIOVAN) 160 MG tablet, Take 1 tablet by mouth once daily, Disp: 90 tablet, Rfl: 3    albuterol (PROVENTIL/VENTOLIN HFA) 90 mcg/actuation inhaler, Inhale 1-2 puffs into the lungs every 6 (six) hours as needed. Rescue, Disp: 18 g, Rfl: 0    QUEtiapine (SEROQUEL) 100 MG Tab, Take 1 tablet (100 mg total) by mouth nightly as needed (insomnia)., Disp: 30 tablet, Rfl: 0    STIOLTO RESPIMAT 2.5-2.5 mcg/actuation Mist, SMARTSI Puff(s) By Mouth Daily (Patient not taking: Reported on 2024), Disp: , Rfl:      Health Maintenance:  Immunization History   Administered Date(s) Administered    COVID-19 Vaccine 2021, 2021    COVID-19, MRNA, LN-S, PF (MODERNA FULL 0.5 ML DOSE) 2021, 2021    DTP 1966, 1966, 1966, 1968, 10/28/1969, 2010    Hepatitis B, Pediatric/Adolescent  "02/10/2012    IPV 05/04/1966, 04/12/1967, 05/17/1967, 10/28/1969    Influenza 10/12/2016, 08/26/2017, 01/19/2023    Influenza (FLUBLOK) - Quadrivalent - Recombinant - PF *Preferred* (egg allergy) 10/18/2023    Influenza - Quadrivalent - PF *Preferred* (6 months and older) 10/12/2016, 08/26/2017, 10/04/2018, 10/03/2019, 10/13/2021, 01/06/2022, 01/19/2023    Influenza - Trivalent (ADULT) 11/05/2020    Influenza - Trivalent - PF (ADULT) 01/06/2022, 01/19/2023    MMR 02/23/1966, 11/29/1967, 02/18/1971, 08/15/1973    Pneumococcal Conjugate - 13 Valent 11/25/2019    Pneumococcal Conjugate - 20 Valent 08/16/2022    Pneumococcal Polysaccharide - 23 Valent 10/26/2018      Health Maintenance   Topic Date Due    PROSTATE-SPECIFIC ANTIGEN  Never done    Foot Exam  Never done    TETANUS VACCINE  Never done    Shingles Vaccine (1 of 2) Never done    LDCT Lung Screen  02/10/2024    Eye Exam  05/02/2024    Hemoglobin A1c  06/14/2024    Lipid Panel  12/14/2024    Low Dose Statin  05/01/2025    Colorectal Cancer Screening  09/12/2025    Hepatitis C Screening  Completed     Health Maintenance Topics with due status: Not Due       Topic Last Completion Date    Colorectal Cancer Screening 09/12/2022    Diabetes Urine Screening 12/14/2023    Lipid Panel 12/14/2023    Hemoglobin A1c 12/14/2023    Low Dose Statin 05/01/2024     Health Maintenance Due   Topic Date Due    PROSTATE-SPECIFIC ANTIGEN  Never done    Foot Exam  Never done    HIV Screening  Never done    TETANUS VACCINE  Never done    Shingles Vaccine (1 of 2) Never done    COVID-19 Vaccine (5 - 2023-24 season) 09/01/2023    RSV Vaccine (Age 60+ and Pregnant patients) (1 - 1-dose 60+ series) Never done    LDCT Lung Screen  02/10/2024    Eye Exam  05/02/2024       PHYSICAL EXAM:  Vitals:    05/01/24 1412   BP: (!) 103/55   BP Location: Left arm   Patient Position: Sitting   BP Method: Large (Automatic)   Pulse: 76   Resp: 18   SpO2: 98%   Weight: 94.8 kg (209 lb)   Height: 5' 11" " (1.803 m)     Body mass index is 29.15 kg/m².  Physical Exam  Vitals and nursing note reviewed.   Constitutional:       General: He is not in acute distress.     Appearance: Normal appearance. He is not ill-appearing or toxic-appearing.   HENT:      Head: Normocephalic and atraumatic.      Right Ear: External ear normal.      Left Ear: External ear normal.      Nose: Nose normal.      Mouth/Throat:      Mouth: Mucous membranes are dry.      Pharynx: Oropharynx is clear.   Eyes:      Extraocular Movements: Extraocular movements intact.      Conjunctiva/sclera: Conjunctivae normal.   Cardiovascular:      Rate and Rhythm: Normal rate and regular rhythm.      Pulses: Normal pulses.      Heart sounds: Normal heart sounds. No murmur heard.  Pulmonary:      Effort: Pulmonary effort is normal. No respiratory distress.      Breath sounds: Normal breath sounds. No wheezing or rales.   Abdominal:      General: Abdomen is flat. Bowel sounds are normal. There is no distension.      Palpations: Abdomen is soft. There is no mass.      Tenderness: There is no abdominal tenderness. There is no right CVA tenderness, left CVA tenderness or guarding.   Musculoskeletal:         General: No swelling or tenderness.      Cervical back: Normal range of motion and neck supple. No rigidity.      Right lower leg: No edema.      Left lower leg: No edema.      Comments: No bony tenderness over cervical, thoracic, lumbar spine   Skin:     General: Skin is warm and dry.      Findings: No lesion or rash.   Neurological:      General: No focal deficit present.      Mental Status: He is alert and oriented to person, place, and time. Mental status is at baseline.      Cranial Nerves: No cranial nerve deficit.      Motor: No weakness.      Gait: Gait normal.   Psychiatric:         Mood and Affect: Mood normal.         Behavior: Behavior normal.          ASSESSMENT/PLAN:  1. Insomnia, unspecified type  Assessment & Plan:        Orders:  -     TSH;  Future; Expected date: 05/01/2024  -     QUEtiapine (SEROQUEL) 100 MG Tab; Take 1 tablet (100 mg total) by mouth nightly as needed (insomnia).  Dispense: 30 tablet; Refill: 0  -     Comprehensive Metabolic Panel; Future; Expected date: 05/01/2024    2. Gastroesophageal reflux disease, unspecified whether esophagitis present  -     Vitamin B12; Future; Expected date: 05/01/2024  -     Iron and TIBC; Future; Expected date: 05/01/2024  -     Ferritin; Future; Expected date: 05/01/2024  -     Ambulatory referral/consult to Outpatient Case Management  -     Ambulatory referral/consult to General Surgery; Future; Expected date: 05/08/2024  -     Ambulatory referral/consult to General Surgery    3. Type 2 diabetes mellitus without complication, with long-term current use of insulin  -     Hemoglobin A1C; Future; Expected date: 05/01/2024  -     Ambulatory referral/consult to Outpatient Case Management  -     Ambulatory referral/consult to General Surgery    4. Class 1 obesity with serious comorbidity and body mass index (BMI) of 30.0 to 30.9 in adult, unspecified obesity type  Overview:  Wt Readings from Last 8 Encounters:   12/21/23 98.4 kg (217 lb)   11/03/23 98 kg (216 lb)   10/03/23 98.2 kg (216 lb 6.4 oz)   06/21/23 98.4 kg (217 lb)   05/26/23 101.2 kg (223 lb)   04/27/23 100.2 kg (221 lb)   03/28/23 102.4 kg (225 lb 12.8 oz)   02/27/23 105.2 kg (232 lb)          Orders:  -     Vitamin D; Future; Expected date: 05/01/2024  -     Ambulatory referral/consult to General Surgery    5. Mixed hyperlipidemia  -     Lipid Panel; Future; Expected date: 05/01/2024    6. Screening PSA (prostate specific antigen)  -     PSA, Screening; Future; Expected date: 05/01/2024    7. Primary hypertension  Overview:  11/3  - carvedilol 12.5 mg BID  - valsartan 160 mg daily  - chlorthalidone 12.5 mg daily  - maintain daily adequate hydration with at least 1.5 to 2 L water     Orders:  -     Comprehensive Metabolic Panel; Future; Expected  date: 05/01/2024    8. Peripheral vascular disease, unspecified  -     CTA Runoff ABD PEL Bilat Lower Ext; Future; Expected date: 05/01/2024  -     Ambulatory referral/consult to Outpatient Case Management    9. Compliance poor    10. Continuous dependence on cigarette smoking  Assessment & Plan:  Dangers of cigarette smoking were reviewed with patient in detail. Patient was Counseled for 3-10 minutes. Nicotine replacement options were discussed. Nicotine replacement was discussed- not prescribed per patient's request      11. Sleeping difficulties  Assessment & Plan:  Per patient over 2 decades he has not slept well at bedtime.   Elavil 100 mg which helps him get some rest, but no longer effective.   Added trazodone which again helped initially, but once again causing significant distress staying up until 8-9 AM in the morning before he starts falling asleep.   Denies SI/HI, mood disturbances, anxiety, increased agitation.   - discontinue trazodone and taper elavil and discontinue.   - start seroquel 100 mg QHS  - follow up one week  Reviewed at length sleep hygiene practices to incorporate at bedtime, patient not following below instructions  - go to bed when sleepy   - no TV watching in bed  - if you do not fall asleep within 20-30 minutes at the beginning of the night or after awakening, then get out of bed  -  a thick boring book to read   - avoid any naps during the day  - take a warm bath or shower before bed and allow body temperature to drop to signal brain to prepare for sleep  - avoid caffeine, nicotine products, alcohol, large meals within two hours of bedtime                 Other than changes above, continue current medications and maintain follow up with specialists.      Follow up in about 1 week (around 5/8/2024) for Med recheck, Lab review.   Recent Results (from the past 2016 hour(s))   CBC Auto Differential    Collection Time: 04/03/24 11:24 AM   Result Value Ref Range    WBC 6.80 3.90 -  12.70 K/uL    RBC 4.65 4.60 - 6.20 M/uL    Hemoglobin 13.0 (L) 14.0 - 18.0 g/dL    Hematocrit 41.0 40.0 - 54.0 %    MCV 88 82 - 98 fL    MCH 28.0 27.0 - 31.0 pg    MCHC 31.7 (L) 32.0 - 36.0 g/dL    RDW 13.2 11.5 - 14.5 %    Platelets 255 150 - 450 K/uL    MPV 11.0 9.2 - 12.9 fL    Immature Granulocytes 0.1 0.0 - 0.5 %    Gran # (ANC) 4.7 1.8 - 7.7 K/uL    Immature Grans (Abs) 0.01 0.00 - 0.04 K/uL    Lymph # 1.3 1.0 - 4.8 K/uL    Mono # 0.6 0.3 - 1.0 K/uL    Eos # 0.2 0.0 - 0.5 K/uL    Baso # 0.03 0.00 - 0.20 K/uL    nRBC 0 0 /100 WBC    Gran % 68.9 38.0 - 73.0 %    Lymph % 19.3 18.0 - 48.0 %    Mono % 8.4 4.0 - 15.0 %    Eosinophil % 2.9 0.0 - 8.0 %    Basophil % 0.4 0.0 - 1.9 %    Differential Method Automated    Renal Function Panel    Collection Time: 04/03/24 11:24 AM   Result Value Ref Range    Glucose 348 (H) 70 - 110 mg/dL    Sodium 134 (L) 136 - 145 mmol/L    Potassium 3.6 3.5 - 5.1 mmol/L    Chloride 100 95 - 110 mmol/L    CO2 27 23 - 29 mmol/L    BUN 13 6 - 20 mg/dL    Calcium 9.3 8.7 - 10.5 mg/dL    Creatinine 1.3 0.5 - 1.4 mg/dL    Albumin 3.6 3.5 - 5.2 g/dL    Phosphorus 4.8 (H) 2.7 - 4.5 mg/dL    eGFR >60.0 >60 mL/min/1.73 m^2    Anion Gap 7 (L) 8 - 16 mmol/L   Protein/Creatinine Ratio, Urine    Collection Time: 04/03/24 11:26 AM   Result Value Ref Range    Protein, Urine Random <7 0 - 15 mg/dL    Creatinine, Urine 36.1 23.0 - 375.0 mg/dL    Prot/Creat Ratio, Urine Unable to calculate 0.00 - 0.20         DO Kelly JacobsQuincy [facility] Center

## 2024-05-01 NOTE — TELEPHONE ENCOUNTER
Rx refill  
Learning behavioral activities to cope with urges.  For example, distraction and changing routines

## 2024-05-02 DIAGNOSIS — R45.1 RESTLESSNESS AND AGITATION: ICD-10-CM

## 2024-05-02 DIAGNOSIS — G47.00 INSOMNIA, UNSPECIFIED TYPE: Primary | ICD-10-CM

## 2024-05-02 LAB — 25(OH)D3+25(OH)D2 SERPL-MCNC: 13 NG/ML (ref 30–96)

## 2024-05-02 RX ORDER — QUETIAPINE FUMARATE 100 MG/1
100 TABLET, FILM COATED ORAL NIGHTLY
Qty: 30 TABLET | Refills: 1 | Status: SHIPPED | OUTPATIENT
Start: 2024-05-02 | End: 2024-05-03

## 2024-05-03 DIAGNOSIS — G47.00 INSOMNIA, UNSPECIFIED TYPE: ICD-10-CM

## 2024-05-03 DIAGNOSIS — R45.1 RESTLESSNESS AND AGITATION: ICD-10-CM

## 2024-05-03 RX ORDER — QUETIAPINE FUMARATE 100 MG/1
100 TABLET, FILM COATED ORAL NIGHTLY
Qty: 30 TABLET | Refills: 0 | Status: SHIPPED | OUTPATIENT
Start: 2024-05-03 | End: 2024-05-15

## 2024-05-03 RX ORDER — QUETIAPINE FUMARATE 100 MG/1
100 TABLET, FILM COATED ORAL NIGHTLY
Qty: 30 TABLET | Refills: 1 | Status: SHIPPED | OUTPATIENT
Start: 2024-05-03 | End: 2024-05-15

## 2024-05-03 RX ORDER — PEN NEEDLE, DIABETIC 33 GX5/32"
1 NEEDLE, DISPOSABLE MISCELLANEOUS DAILY
Qty: 100 EACH | Refills: 1 | Status: SHIPPED | OUTPATIENT
Start: 2024-05-03

## 2024-05-07 ENCOUNTER — HOSPITAL ENCOUNTER (OUTPATIENT)
Dept: RADIOLOGY | Facility: HOSPITAL | Age: 59
Discharge: HOME | End: 2024-05-07
Attending: STUDENT IN AN ORGANIZED HEALTH CARE EDUCATION/TRAINING PROGRAM
Payer: MEDICAID

## 2024-05-07 DIAGNOSIS — G47.00 INSOMNIA, UNSPECIFIED TYPE: ICD-10-CM

## 2024-05-07 DIAGNOSIS — I73.9 PERIPHERAL VASCULAR DISEASE, UNSPECIFIED: ICD-10-CM

## 2024-05-08 DIAGNOSIS — Z79.4 TYPE 2 DIABETES MELLITUS WITH OTHER SPECIFIED COMPLICATION, WITH LONG-TERM CURRENT USE OF INSULIN: ICD-10-CM

## 2024-05-08 DIAGNOSIS — E11.69 TYPE 2 DIABETES MELLITUS WITH OTHER SPECIFIED COMPLICATION, WITH LONG-TERM CURRENT USE OF INSULIN: ICD-10-CM

## 2024-05-08 DIAGNOSIS — E11.65 UNCONTROLLED DIABETES MELLITUS WITH HYPERGLYCEMIA, WITH LONG-TERM CURRENT USE OF INSULIN: Primary | ICD-10-CM

## 2024-05-08 DIAGNOSIS — Z79.4 UNCONTROLLED DIABETES MELLITUS WITH HYPERGLYCEMIA, WITH LONG-TERM CURRENT USE OF INSULIN: Primary | ICD-10-CM

## 2024-05-08 RX ORDER — QUETIAPINE FUMARATE 100 MG/1
100 TABLET, FILM COATED ORAL NIGHTLY
Qty: 30 TABLET | Refills: 0 | OUTPATIENT
Start: 2024-05-08

## 2024-05-08 RX ORDER — INSULIN PUMP SYRINGE, 3 ML
EACH MISCELLANEOUS
Qty: 1 EACH | Refills: 0 | Status: SHIPPED | OUTPATIENT
Start: 2024-05-08

## 2024-05-08 RX ORDER — LANCETS
1 EACH MISCELLANEOUS 4 TIMES DAILY
Qty: 200 EACH | Refills: 4 | Status: SHIPPED | OUTPATIENT
Start: 2024-05-08 | End: 2025-05-08

## 2024-05-10 ENCOUNTER — PATIENT OUTREACH (OUTPATIENT)
Dept: ADMINISTRATIVE | Facility: OTHER | Age: 59
End: 2024-05-10
Payer: MEDICAID

## 2024-05-10 NOTE — PROGRESS NOTES
This Community Health Worker (CHW) completed Social Determinant of Health (SDOH)  Questionnaire with patient/caregiver via telephone today.  Patient declined any SDOH needs at this time.

## 2024-05-14 DIAGNOSIS — G47.00 INSOMNIA, UNSPECIFIED TYPE: ICD-10-CM

## 2024-05-14 DIAGNOSIS — G56.93 BILATERAL NEUROPATHY OF UPPER EXTREMITIES: ICD-10-CM

## 2024-05-15 ENCOUNTER — OFFICE VISIT (OUTPATIENT)
Dept: PRIMARY CARE CLINIC | Facility: CLINIC | Age: 59
End: 2024-05-15
Payer: MEDICAID

## 2024-05-15 VITALS
DIASTOLIC BLOOD PRESSURE: 54 MMHG | OXYGEN SATURATION: 95 % | SYSTOLIC BLOOD PRESSURE: 99 MMHG | HEART RATE: 64 BPM | WEIGHT: 211 LBS | TEMPERATURE: 98 F | BODY MASS INDEX: 29.54 KG/M2 | RESPIRATION RATE: 17 BRPM | HEIGHT: 71 IN

## 2024-05-15 DIAGNOSIS — E11.9 TYPE 2 DIABETES MELLITUS WITHOUT COMPLICATION, WITH LONG-TERM CURRENT USE OF INSULIN: ICD-10-CM

## 2024-05-15 DIAGNOSIS — F31.9 BIPOLAR AFFECTIVE DISORDER, REMISSION STATUS UNSPECIFIED: Primary | ICD-10-CM

## 2024-05-15 DIAGNOSIS — N18.32 STAGE 3B CHRONIC KIDNEY DISEASE: ICD-10-CM

## 2024-05-15 DIAGNOSIS — E78.2 MIXED HYPERLIPIDEMIA: ICD-10-CM

## 2024-05-15 DIAGNOSIS — Z79.4 TYPE 2 DIABETES MELLITUS WITHOUT COMPLICATION, WITH LONG-TERM CURRENT USE OF INSULIN: ICD-10-CM

## 2024-05-15 DIAGNOSIS — E11.42 TYPE 2 DIABETES MELLITUS WITH DIABETIC POLYNEUROPATHY, WITH LONG-TERM CURRENT USE OF INSULIN: ICD-10-CM

## 2024-05-15 DIAGNOSIS — D64.9 ANEMIA, UNSPECIFIED TYPE: ICD-10-CM

## 2024-05-15 DIAGNOSIS — J44.9 CHRONIC OBSTRUCTIVE PULMONARY DISEASE, UNSPECIFIED COPD TYPE: ICD-10-CM

## 2024-05-15 DIAGNOSIS — E66.9 CLASS 1 OBESITY WITH SERIOUS COMORBIDITY AND BODY MASS INDEX (BMI) OF 30.0 TO 30.9 IN ADULT, UNSPECIFIED OBESITY TYPE: ICD-10-CM

## 2024-05-15 DIAGNOSIS — R42 DIZZINESS: ICD-10-CM

## 2024-05-15 DIAGNOSIS — Z79.4 TYPE 2 DIABETES MELLITUS WITH DIABETIC POLYNEUROPATHY, WITH LONG-TERM CURRENT USE OF INSULIN: ICD-10-CM

## 2024-05-15 DIAGNOSIS — I73.9 PAD (PERIPHERAL ARTERY DISEASE): ICD-10-CM

## 2024-05-15 DIAGNOSIS — I10 PRIMARY HYPERTENSION: ICD-10-CM

## 2024-05-15 LAB
ALBUMIN SERPL BCP-MCNC: 3.5 G/DL (ref 3.5–5.2)
ALP SERPL-CCNC: 106 U/L (ref 55–135)
ALT SERPL W/O P-5'-P-CCNC: 24 U/L (ref 10–44)
ANION GAP SERPL CALC-SCNC: 7 MMOL/L (ref 3–11)
AST SERPL-CCNC: 14 U/L (ref 10–40)
BILIRUB SERPL-MCNC: 0.9 MG/DL (ref 0.1–1)
BUN SERPL-MCNC: 25 MG/DL (ref 6–20)
CALCIUM SERPL-MCNC: 9.5 MG/DL (ref 8.7–10.5)
CHLORIDE SERPL-SCNC: 103 MMOL/L (ref 95–110)
CO2 SERPL-SCNC: 28 MMOL/L (ref 23–29)
CREAT SERPL-MCNC: 1.8 MG/DL (ref 0.5–1.4)
EST. GFR  (NO RACE VARIABLE): 42.6 ML/MIN/1.73 M^2
FERRITIN SERPL-MCNC: 326 NG/ML (ref 20–300)
GLUCOSE SERPL-MCNC: 112 MG/DL (ref 70–110)
IRON SATN MFR SERPL: 24 % (ref 20–50)
IRON SERPL-MCNC: 66 UG/DL (ref 45–160)
MAGNESIUM SERPL-MCNC: 2.7 MG/DL (ref 1.6–2.6)
POTASSIUM SERPL-SCNC: 4 MMOL/L (ref 3.5–5.1)
PROT SERPL-MCNC: 7.5 G/DL (ref 6–8.4)
SODIUM SERPL-SCNC: 138 MMOL/L (ref 136–145)
TOTAL IRON BINDING CAPACITY: 280 UG/DL (ref 250–450)
VIT B12 SERPL-MCNC: 619 PG/ML (ref 210–950)

## 2024-05-15 PROCEDURE — 1159F MED LIST DOCD IN RCRD: CPT | Mod: CPTII,,, | Performed by: STUDENT IN AN ORGANIZED HEALTH CARE EDUCATION/TRAINING PROGRAM

## 2024-05-15 PROCEDURE — 83540 ASSAY OF IRON: CPT | Performed by: STUDENT IN AN ORGANIZED HEALTH CARE EDUCATION/TRAINING PROGRAM

## 2024-05-15 PROCEDURE — 83735 ASSAY OF MAGNESIUM: CPT | Performed by: STUDENT IN AN ORGANIZED HEALTH CARE EDUCATION/TRAINING PROGRAM

## 2024-05-15 PROCEDURE — 80053 COMPREHEN METABOLIC PANEL: CPT | Performed by: STUDENT IN AN ORGANIZED HEALTH CARE EDUCATION/TRAINING PROGRAM

## 2024-05-15 PROCEDURE — 4010F ACE/ARB THERAPY RXD/TAKEN: CPT | Mod: CPTII,,, | Performed by: STUDENT IN AN ORGANIZED HEALTH CARE EDUCATION/TRAINING PROGRAM

## 2024-05-15 PROCEDURE — 1160F RVW MEDS BY RX/DR IN RCRD: CPT | Mod: CPTII,,, | Performed by: STUDENT IN AN ORGANIZED HEALTH CARE EDUCATION/TRAINING PROGRAM

## 2024-05-15 PROCEDURE — 3074F SYST BP LT 130 MM HG: CPT | Mod: CPTII,,, | Performed by: STUDENT IN AN ORGANIZED HEALTH CARE EDUCATION/TRAINING PROGRAM

## 2024-05-15 PROCEDURE — 99214 OFFICE O/P EST MOD 30 MIN: CPT | Mod: PBBFAC | Performed by: STUDENT IN AN ORGANIZED HEALTH CARE EDUCATION/TRAINING PROGRAM

## 2024-05-15 PROCEDURE — 82728 ASSAY OF FERRITIN: CPT | Performed by: STUDENT IN AN ORGANIZED HEALTH CARE EDUCATION/TRAINING PROGRAM

## 2024-05-15 PROCEDURE — 3008F BODY MASS INDEX DOCD: CPT | Mod: CPTII,,, | Performed by: STUDENT IN AN ORGANIZED HEALTH CARE EDUCATION/TRAINING PROGRAM

## 2024-05-15 PROCEDURE — 99214 OFFICE O/P EST MOD 30 MIN: CPT | Mod: S$PBB,,, | Performed by: STUDENT IN AN ORGANIZED HEALTH CARE EDUCATION/TRAINING PROGRAM

## 2024-05-15 PROCEDURE — 3078F DIAST BP <80 MM HG: CPT | Mod: CPTII,,, | Performed by: STUDENT IN AN ORGANIZED HEALTH CARE EDUCATION/TRAINING PROGRAM

## 2024-05-15 PROCEDURE — 82607 VITAMIN B-12: CPT | Performed by: STUDENT IN AN ORGANIZED HEALTH CARE EDUCATION/TRAINING PROGRAM

## 2024-05-15 PROCEDURE — 99999 PR PBB SHADOW E&M-EST. PATIENT-LVL IV: CPT | Mod: PBBFAC,,, | Performed by: STUDENT IN AN ORGANIZED HEALTH CARE EDUCATION/TRAINING PROGRAM

## 2024-05-15 PROCEDURE — 3046F HEMOGLOBIN A1C LEVEL >9.0%: CPT | Mod: CPTII,,, | Performed by: STUDENT IN AN ORGANIZED HEALTH CARE EDUCATION/TRAINING PROGRAM

## 2024-05-15 PROCEDURE — 3066F NEPHROPATHY DOC TX: CPT | Mod: CPTII,,, | Performed by: STUDENT IN AN ORGANIZED HEALTH CARE EDUCATION/TRAINING PROGRAM

## 2024-05-15 RX ORDER — TIOTROPIUM BROMIDE AND OLODATEROL 3.124; 2.736 UG/1; UG/1
2 SPRAY, METERED RESPIRATORY (INHALATION) DAILY
Qty: 4 G | Refills: 11 | Status: SHIPPED | OUTPATIENT
Start: 2024-05-15 | End: 2025-05-15

## 2024-05-15 RX ORDER — QUETIAPINE FUMARATE 100 MG/1
100 TABLET, FILM COATED ORAL NIGHTLY
Qty: 30 TABLET | Refills: 5 | Status: SHIPPED | OUTPATIENT
Start: 2024-05-15

## 2024-05-15 RX ORDER — ALBUTEROL SULFATE 90 UG/1
1-2 AEROSOL, METERED RESPIRATORY (INHALATION) EVERY 6 HOURS PRN
Qty: 18 G | Refills: 5 | Status: SHIPPED | OUTPATIENT
Start: 2024-05-15 | End: 2025-05-15

## 2024-05-15 NOTE — PROGRESS NOTES
[facility] Note    HPI:  Jayden Canada Jr. is a 59 y.o. male who presents today for Follow-up (2 week follow up)  59 year old with hypertension, hyperlipidemia, vitamin-D deficiency, anemia, diabetes mellitus type 2, GERD with history of esophagitis, lupus, chronic kidney disease stage 3B, peripheral artery disease.  Diabetes poorly controlled. Patient denies increased thirst, frequent urination.   Generalized weakness remains. Denies changes in appetite.  Denies fever, chills, chest pain, palpitations, shortness of breath, abdominal pain, nausea, vomiting, constipation diarrhea.     ROS   A review of systems was performed and was negative except as noted above.    I personally reviewed allergies, past medical, surgical, social and family history and updated as appropriate.    Medications:    Current Outpatient Medications:     aspirin (ECOTRIN) 81 MG EC tablet, Take 81 mg by mouth once daily. NOTIFIED DR. GAINES OFFICE, Disp: , Rfl:     blood sugar diagnostic Strp, 1 strip by Misc.(Non-Drug; Combo Route) route 4 (four) times daily., Disp: 200 each, Rfl: 4    blood-glucose meter kit, Use as instructed, Disp: 1 each, Rfl: 0    carvediloL (COREG) 12.5 MG tablet, Take 12.5 mg by mouth 2 (two) times daily., Disp: , Rfl:     cilostazol (PLETAL) 100 MG Tab, , Disp: , Rfl:     clopidogreL (PLAVIX) 75 mg tablet, Take 75 mg by mouth once daily. NOTIFIED DR. GAINES OFFICE, Disp: , Rfl:     dorzolamide-timolol 2-0.5% (COSOPT) 22.3-6.8 mg/mL ophthalmic solution, SMARTSI Drop(s) In Eye(s) Every 12 Hours, Disp: , Rfl:     ezetimibe (ZETIA) 10 mg tablet, Take 1 tablet (10 mg total) by mouth once daily., Disp: 90 tablet, Rfl: 1    FEROSUL 325 mg (65 mg iron) Tab tablet, Take by mouth., Disp: , Rfl:     insulin detemir U-100, Levemir, (LEVEMIR FLEXPEN) 100 unit/mL (3 mL) InPn pen, Inject 20 Units into the skin once daily., Disp: 6 mL, Rfl: 2    JARDIANCE 25 mg tablet, Take 1 tablet (25 mg total) by mouth  "once daily., Disp: 30 tablet, Rfl: 11    lancets Misc, 1 lancet  by Misc.(Non-Drug; Combo Route) route 4 (four) times daily., Disp: 200 each, Rfl: 4    latanoprost 0.005 % ophthalmic solution, , Disp: , Rfl:     metFORMIN (GLUCOPHAGE-XR) 500 MG ER 24hr tablet, Take 2 tablets (1,000 mg total) by mouth 2 (two) times daily with meals., Disp: 120 tablet, Rfl: 11    pen needle, diabetic 33 gauge x 5/32" Ndle, 1 each by Misc.(Non-Drug; Combo Route) route once daily., Disp: 100 each, Rfl: 1    QUEtiapine (SEROQUEL) 100 MG Tab, Take 1 tablet (100 mg total) by mouth every evening., Disp: 30 tablet, Rfl: 5    rosuvastatin (CRESTOR) 40 MG Tab, Take 1 tablet (40 mg total) by mouth every evening., Disp: 90 tablet, Rfl: 3    valsartan (DIOVAN) 160 MG tablet, Take 1 tablet by mouth once daily, Disp: 90 tablet, Rfl: 3    albuterol (PROVENTIL/VENTOLIN HFA) 90 mcg/actuation inhaler, Inhale 1-2 puffs into the lungs every 6 (six) hours as needed for Wheezing or Shortness of Breath. Rescue, Disp: 18 g, Rfl: 5    amitriptyline (ELAVIL) 100 MG tablet, Take 1 tablet (100 mg total) by mouth every evening., Disp: 30 tablet, Rfl: 5    QUEtiapine (SEROQUEL) 100 MG Tab, Take 1 tablet (100 mg total) by mouth every evening., Disp: 30 tablet, Rfl: 0    STIOLTO RESPIMAT 2.5-2.5 mcg/actuation Mist, Inhale 2 puffs into the lungs once daily. Controller, Disp: 4 g, Rfl: 11     Health Maintenance:  Immunization History   Administered Date(s) Administered    COVID-19 Vaccine 03/01/2021, 04/06/2021    COVID-19, MRNA, LN-S, PF (MODERNA FULL 0.5 ML DOSE) 03/01/2021, 04/06/2021    DTP 02/23/1966, 03/30/1966, 05/04/1966, 11/27/1968, 10/28/1969, 12/05/2010    Hepatitis B, Pediatric/Adolescent 02/10/2012    IPV 05/04/1966, 04/12/1967, 05/17/1967, 10/28/1969    Influenza 10/12/2016, 08/26/2017, 01/19/2023    Influenza (FLUBLOK) - Quadrivalent - Recombinant - PF *Preferred* (egg allergy) 10/18/2023    Influenza - Quadrivalent - PF *Preferred* (6 months and older) " "10/12/2016, 08/26/2017, 10/04/2018, 10/03/2019, 10/13/2021, 01/06/2022, 01/19/2023    Influenza - Trivalent (ADULT) 11/05/2020    Influenza - Trivalent - PF (ADULT) 01/06/2022, 01/19/2023    MMR 02/23/1966, 11/29/1967, 02/18/1971, 08/15/1973    Pneumococcal Conjugate - 13 Valent 11/25/2019    Pneumococcal Conjugate - 20 Valent 08/16/2022    Pneumococcal Polysaccharide - 23 Valent 10/26/2018      Health Maintenance   Topic Date Due    Foot Exam  Never done    TETANUS VACCINE  Never done    Shingles Vaccine (1 of 2) Never done    LDCT Lung Screen  02/10/2024    Eye Exam  05/02/2024    Hemoglobin A1c  08/01/2024    PROSTATE-SPECIFIC ANTIGEN  05/01/2025    Lipid Panel  05/01/2025    Low Dose Statin  05/15/2025    Colorectal Cancer Screening  09/12/2025    Hepatitis C Screening  Completed     Health Maintenance Topics with due status: Not Due       Topic Last Completion Date    Colorectal Cancer Screening 09/12/2022    Diabetes Urine Screening 12/14/2023    PROSTATE-SPECIFIC ANTIGEN 05/01/2024    Lipid Panel 05/01/2024    Hemoglobin A1c 05/01/2024    Low Dose Statin 05/15/2024     Health Maintenance Due   Topic Date Due    Foot Exam  Never done    HIV Screening  Never done    TETANUS VACCINE  Never done    Shingles Vaccine (1 of 2) Never done    COVID-19 Vaccine (5 - 2023-24 season) 09/01/2023    RSV Vaccine (Age 60+ and Pregnant patients) (1 - 1-dose 60+ series) Never done    LDCT Lung Screen  02/10/2024    Eye Exam  05/02/2024       PHYSICAL EXAM:  Vitals:    05/15/24 1436   BP: (!) 99/54   BP Location: Right arm   Patient Position: Sitting   BP Method: Large (Automatic)   Pulse: 64   Resp: 17   Temp: 98.1 °F (36.7 °C)   SpO2: 95%   Weight: 95.7 kg (211 lb)   Height: 5' 11" (1.803 m)     Body mass index is 29.43 kg/m².  Physical Exam  Vitals and nursing note reviewed.   Constitutional:       General: He is not in acute distress.     Appearance: Normal appearance. He is not ill-appearing or toxic-appearing.   HENT:      " Head: Normocephalic and atraumatic.      Right Ear: External ear normal.      Left Ear: External ear normal.      Nose: Nose normal.      Mouth/Throat:      Mouth: Mucous membranes are dry.      Pharynx: Oropharynx is clear.   Eyes:      Extraocular Movements: Extraocular movements intact.      Conjunctiva/sclera: Conjunctivae normal.   Cardiovascular:      Rate and Rhythm: Normal rate and regular rhythm.      Pulses: Normal pulses.      Heart sounds: Normal heart sounds. No murmur heard.  Pulmonary:      Effort: Pulmonary effort is normal. No respiratory distress.      Breath sounds: Normal breath sounds. No wheezing or rales.   Abdominal:      General: Abdomen is flat. There is no distension.      Palpations: Abdomen is soft. There is no mass.      Tenderness: There is no abdominal tenderness. There is no right CVA tenderness, left CVA tenderness or guarding.   Musculoskeletal:         General: No swelling or tenderness.      Cervical back: Normal range of motion and neck supple. No rigidity.      Right lower leg: No edema.      Left lower leg: No edema.      Comments: No bony tenderness over cervical, thoracic, lumbar spine   Skin:     General: Skin is warm and dry.      Findings: No lesion or rash.   Neurological:      General: No focal deficit present.      Mental Status: He is alert and oriented to person, place, and time. Mental status is at baseline.      Cranial Nerves: No cranial nerve deficit.      Motor: No weakness.      Gait: Gait normal.   Psychiatric:         Mood and Affect: Mood normal.         Behavior: Behavior normal.          ASSESSMENT/PLAN:  1. Bipolar affective disorder, remission status unspecified  -     QUEtiapine (SEROQUEL) 100 MG Tab; Take 1 tablet (100 mg total) by mouth every evening.  Dispense: 30 tablet; Refill: 5    2. Mixed hyperlipidemia  -     Comprehensive Metabolic Panel; Future; Expected date: 05/15/2024    3. Primary hypertension  Overview:  - carvedilol 12.5 mg BID  -  valsartan 160 mg daily  - chlorthalidone 12.5 mg daily  - maintain daily adequate hydration with at least 1.5 to 2 L water       4. Type 2 diabetes mellitus without complication, with long-term current use of insulin  Assessment & Plan:  Patient recently stopped taking all his antihyperglycemics secondary to renal function decline.   Lab Results   Component Value Date    HGBA1C 11.5 (H) 05/01/2024     - jardiance 25 mg daily  - metformin 1000 mg BID  - basal insulin 20U nightly  - POC glucose fasting, post prandial 2hours, and before bedtime  - f/u A1C 3 months    Orders:  -     Cancel: Basic Metabolic Panel; Future; Expected date: 05/15/2024  -     Comprehensive Metabolic Panel; Future; Expected date: 05/15/2024    5. Stage 3b chronic kidney disease  Assessment & Plan:  Lab Results   Component Value Date    CREATININE 1.8 (H) 05/15/2024    BUN 25 (H) 05/15/2024     05/15/2024    K 4.0 05/15/2024     05/15/2024    CO2 28 05/15/2024   Stable renal function. Cr within normal limits.  Estimated Creatinine Clearance: 51.5 mL/min (A) (based on SCr of 1.8 mg/dL (H)).  - maintain daily adequate hydration  - avoid nephrotoxic medications including use of NSAIDs  - f/u nephrology specialist      Orders:  -     Magnesium; Future; Expected date: 05/15/2024    6. Anemia, unspecified type  -     Vitamin B12  -     Ferritin  -     Iron and TIBC    7. Class 1 obesity with serious comorbidity and body mass index (BMI) of 30.0 to 30.9 in adult, unspecified obesity type  Overview:  Wt Readings from Last 8 Encounters:   05/15/24 95.7 kg (211 lb)   05/01/24 94.8 kg (209 lb)   12/21/23 98.4 kg (217 lb)   11/03/23 98 kg (216 lb)   10/03/23 98.2 kg (216 lb 6.4 oz)   06/21/23 98.4 kg (217 lb)   05/26/23 101.2 kg (223 lb)   04/27/23 100.2 kg (221 lb)          Assessment & Plan:    Recommendations:   Stay physically active. As tolerated alternate resistance training with stretching and cardio. Goal of 150 minutes per week of  moderate intensity activity or 7,500 - 10,000 steps per day. Follow the Mediterranean Diet. Include whole fresh fruits, vegetables, olive oil, seeds, nuts, whole grains, cold water fish, salmon, mackerel and lean cuts of meat.  Do not drink sugary/diet carbonated beverages. Decrease portion sizes slightly which will result in an approximately 500-calorie deficit. Avoid fast or fried and processed food, especially canned foods. Avoid refined carbohydrates, white starchy foods, flour, white potato, bread, muffins, and cakes. Consider substituting one meal a day with a meal replacement such as Slim fast, lean cuisine, or weight watcher's. Follow a healthy diet that includes enough calcium, vitamin D and proteins for bone health.        8. Chronic obstructive pulmonary disease, unspecified COPD type  -     STIOLTO RESPIMAT 2.5-2.5 mcg/actuation Mist; Inhale 2 puffs into the lungs once daily. Controller  Dispense: 4 g; Refill: 11  -     albuterol (PROVENTIL/VENTOLIN HFA) 90 mcg/actuation inhaler; Inhale 1-2 puffs into the lungs every 6 (six) hours as needed for Wheezing or Shortness of Breath. Rescue  Dispense: 18 g; Refill: 5    9. Dizziness  -     Magnesium; Future; Expected date: 05/15/2024    10. PAD (peripheral artery disease)  Assessment & Plan:  8/16/23 Sonogram bilateral legs, significant stenosis involving the mid to distal left superficial femoral artery  Claudication pain always in left leg pain, but right leg started to hurt over the past several months.  Right ankle injured in 2010 hit by drill pipe with multiple fractures, s/p harware per patient.   - taking daily aspirin and plavix, statin  - pletal on board  - f/u referral vascular specialist        11. Type 2 diabetes mellitus with diabetic polyneuropathy, with long-term current use of insulin  Assessment & Plan:  Patient recently stopped taking all his antihyperglycemics secondary to renal function decline.   Lab Results   Component Value Date    HGBA1C  11.5 (H) 05/01/2024     - jardiance 25 mg daily  - metformin 1000 mg BID  - basal insulin 20U nightly  - POC glucose fasting, post prandial 2hours, and before bedtime  - f/u A1C 3 months          Other than changes above, continue current medications and maintain follow up with specialists.      Follow up in about 1 week (around 5/22/2024) for BP check, Lab review.   Recent Results (from the past 2016 hour(s))   CBC Auto Differential    Collection Time: 04/03/24 11:24 AM   Result Value Ref Range    WBC 6.80 3.90 - 12.70 K/uL    RBC 4.65 4.60 - 6.20 M/uL    Hemoglobin 13.0 (L) 14.0 - 18.0 g/dL    Hematocrit 41.0 40.0 - 54.0 %    MCV 88 82 - 98 fL    MCH 28.0 27.0 - 31.0 pg    MCHC 31.7 (L) 32.0 - 36.0 g/dL    RDW 13.2 11.5 - 14.5 %    Platelets 255 150 - 450 K/uL    MPV 11.0 9.2 - 12.9 fL    Immature Granulocytes 0.1 0.0 - 0.5 %    Gran # (ANC) 4.7 1.8 - 7.7 K/uL    Immature Grans (Abs) 0.01 0.00 - 0.04 K/uL    Lymph # 1.3 1.0 - 4.8 K/uL    Mono # 0.6 0.3 - 1.0 K/uL    Eos # 0.2 0.0 - 0.5 K/uL    Baso # 0.03 0.00 - 0.20 K/uL    nRBC 0 0 /100 WBC    Gran % 68.9 38.0 - 73.0 %    Lymph % 19.3 18.0 - 48.0 %    Mono % 8.4 4.0 - 15.0 %    Eosinophil % 2.9 0.0 - 8.0 %    Basophil % 0.4 0.0 - 1.9 %    Differential Method Automated    Renal Function Panel    Collection Time: 04/03/24 11:24 AM   Result Value Ref Range    Glucose 348 (H) 70 - 110 mg/dL    Sodium 134 (L) 136 - 145 mmol/L    Potassium 3.6 3.5 - 5.1 mmol/L    Chloride 100 95 - 110 mmol/L    CO2 27 23 - 29 mmol/L    BUN 13 6 - 20 mg/dL    Calcium 9.3 8.7 - 10.5 mg/dL    Creatinine 1.3 0.5 - 1.4 mg/dL    Albumin 3.6 3.5 - 5.2 g/dL    Phosphorus 4.8 (H) 2.7 - 4.5 mg/dL    eGFR >60.0 >60 mL/min/1.73 m^2    Anion Gap 7 (L) 8 - 16 mmol/L   Protein/Creatinine Ratio, Urine    Collection Time: 04/03/24 11:26 AM   Result Value Ref Range    Protein, Urine Random <7 0 - 15 mg/dL    Creatinine, Urine 36.1 23.0 - 375.0 mg/dL    Prot/Creat Ratio, Urine Unable to calculate 0.00 -  0.20   Comprehensive Metabolic Panel    Collection Time: 05/01/24  3:37 PM   Result Value Ref Range    Sodium 129 (L) 136 - 145 mmol/L    Potassium 3.9 3.5 - 5.1 mmol/L    Chloride 93 (L) 95 - 110 mmol/L    CO2 27 23 - 29 mmol/L    Glucose 658 (HH) 70 - 110 mg/dL    BUN 25 (H) 6 - 20 mg/dL    Creatinine 1.8 (H) 0.5 - 1.4 mg/dL    Calcium 8.8 8.7 - 10.5 mg/dL    Total Protein 7.9 6.0 - 8.4 g/dL    Albumin 3.6 3.5 - 5.2 g/dL    Total Bilirubin 0.7 0.1 - 1.0 mg/dL    Alkaline Phosphatase 142 (H) 55 - 135 U/L    AST 20 10 - 40 U/L    ALT 31 10 - 44 U/L    eGFR 42.6 (A) >60 mL/min/1.73 m^2    Anion Gap 9 3 - 11 mmol/L   Iron and TIBC    Collection Time: 05/01/24  3:37 PM   Result Value Ref Range    Iron 75 45 - 160 ug/dL    TIBC 278 250 - 450 ug/dL    Iron Saturation 27 20 - 50 %   PSA, Screening    Collection Time: 05/01/24  3:37 PM   Result Value Ref Range    PSA, Screen 0.60 0.00 - 4.00 ng/mL   Vitamin D    Collection Time: 05/01/24  3:37 PM   Result Value Ref Range    Vit D, 25-Hydroxy 13 (L) 30 - 96 ng/mL   TSH    Collection Time: 05/01/24  3:37 PM   Result Value Ref Range    TSH 0.868 0.400 - 4.000 uIU/mL   Ferritin    Collection Time: 05/01/24  3:37 PM   Result Value Ref Range    Ferritin 357 (H) 20.0 - 300.0 ng/mL   Vitamin B12    Collection Time: 05/01/24  3:37 PM   Result Value Ref Range    Vitamin B-12 590 210 - 950 pg/mL   Lipid Panel    Collection Time: 05/01/24  3:37 PM   Result Value Ref Range    Cholesterol 129 120 - 199 mg/dL    Triglycerides 337 (H) 30 - 150 mg/dL    HDL 27 (L) 40 - 75 mg/dL    LDL Cholesterol 34.6 (L) 63.0 - 159.0 mg/dL    HDL/Cholesterol Ratio 20.9 20.0 - 50.0 %    Total Cholesterol/HDL Ratio 4.8 2.0 - 5.0    Non-HDL Cholesterol 102 mg/dL   Hemoglobin A1C    Collection Time: 05/01/24  3:37 PM   Result Value Ref Range    Hemoglobin A1C 11.5 (H) 4.0 - 5.6 %    Estimated Avg Glucose 283 (H) 68 - 131 mg/dL   Magnesium    Collection Time: 05/15/24  3:45 PM   Result Value Ref Range     Magnesium 2.7 (H) 1.6 - 2.6 mg/dL   Comprehensive Metabolic Panel    Collection Time: 05/15/24  3:45 PM   Result Value Ref Range    Sodium 138 136 - 145 mmol/L    Potassium 4.0 3.5 - 5.1 mmol/L    Chloride 103 95 - 110 mmol/L    CO2 28 23 - 29 mmol/L    Glucose 112 (H) 70 - 110 mg/dL    BUN 25 (H) 6 - 20 mg/dL    Creatinine 1.8 (H) 0.5 - 1.4 mg/dL    Calcium 9.5 8.7 - 10.5 mg/dL    Total Protein 7.5 6.0 - 8.4 g/dL    Albumin 3.5 3.5 - 5.2 g/dL    Total Bilirubin 0.9 0.1 - 1.0 mg/dL    Alkaline Phosphatase 106 55 - 135 U/L    AST 14 10 - 40 U/L    ALT 24 10 - 44 U/L    eGFR 42.6 (A) >60 mL/min/1.73 m^2    Anion Gap 7 3 - 11 mmol/L   Vitamin B12    Collection Time: 05/15/24  3:45 PM   Result Value Ref Range    Vitamin B-12 619 210 - 950 pg/mL   Ferritin    Collection Time: 05/15/24  3:45 PM   Result Value Ref Range    Ferritin 326 (H) 20.0 - 300.0 ng/mL   Iron and TIBC    Collection Time: 05/15/24  3:45 PM   Result Value Ref Range    Iron 66 45 - 160 ug/dL    TIBC 280 250 - 450 ug/dL    Iron Saturation 24 20 - 50 %     Marge Menjivar DO  [facility]

## 2024-05-15 NOTE — ASSESSMENT & PLAN NOTE
8/16/23 Sonogram bilateral legs, significant stenosis involving the mid to distal left superficial femoral artery  Claudication pain always in left leg pain, but right leg started to hurt over the past several months.  Right ankle injured in 2010 hit by drill pipe with multiple fractures, s/p harware per patient.   - taking daily aspirin and plavix, statin  - pletal on board  - f/u referral vascular specialist

## 2024-05-16 RX ORDER — AMITRIPTYLINE HYDROCHLORIDE 100 MG/1
100 TABLET ORAL NIGHTLY
Qty: 30 TABLET | Refills: 5 | Status: SHIPPED | OUTPATIENT
Start: 2024-05-16 | End: 2024-11-12

## 2024-05-16 RX ORDER — QUETIAPINE FUMARATE 100 MG/1
100 TABLET, FILM COATED ORAL NIGHTLY
Qty: 30 TABLET | Refills: 0 | Status: SHIPPED | OUTPATIENT
Start: 2024-05-16

## 2024-05-19 PROBLEM — E11.42 TYPE 2 DIABETES MELLITUS WITH DIABETIC POLYNEUROPATHY, WITH LONG-TERM CURRENT USE OF INSULIN: Status: ACTIVE | Noted: 2022-03-10

## 2024-05-19 NOTE — ASSESSMENT & PLAN NOTE
Patient recently stopped taking all his antihyperglycemics secondary to renal function decline.   Lab Results   Component Value Date    HGBA1C 11.5 (H) 05/01/2024     - jardiance 25 mg daily  - metformin 1000 mg BID  - basal insulin 20U nightly  - POC glucose fasting, post prandial 2hours, and before bedtime  - f/u A1C 3 months

## 2024-05-19 NOTE — ASSESSMENT & PLAN NOTE
Lab Results   Component Value Date    CREATININE 1.8 (H) 05/15/2024    BUN 25 (H) 05/15/2024     05/15/2024    K 4.0 05/15/2024     05/15/2024    CO2 28 05/15/2024   Stable renal function. Cr within normal limits.  Estimated Creatinine Clearance: 51.5 mL/min (A) (based on SCr of 1.8 mg/dL (H)).  - maintain daily adequate hydration  - avoid nephrotoxic medications including use of NSAIDs  - f/u nephrology specialist

## 2024-05-23 ENCOUNTER — OFFICE VISIT (OUTPATIENT)
Dept: PRIMARY CARE CLINIC | Facility: CLINIC | Age: 59
End: 2024-05-23
Payer: MEDICAID

## 2024-05-23 VITALS
HEIGHT: 71 IN | BODY MASS INDEX: 29.26 KG/M2 | RESPIRATION RATE: 16 BRPM | WEIGHT: 209 LBS | SYSTOLIC BLOOD PRESSURE: 113 MMHG | HEART RATE: 68 BPM | DIASTOLIC BLOOD PRESSURE: 58 MMHG | OXYGEN SATURATION: 97 %

## 2024-05-23 DIAGNOSIS — F17.290 CIGAR SMOKER: ICD-10-CM

## 2024-05-23 DIAGNOSIS — Z79.4 TYPE 2 DIABETES MELLITUS WITH DIABETIC POLYNEUROPATHY, WITH LONG-TERM CURRENT USE OF INSULIN: ICD-10-CM

## 2024-05-23 DIAGNOSIS — M19.90 CHRONIC INFLAMMATORY ARTHRITIS: ICD-10-CM

## 2024-05-23 DIAGNOSIS — E66.9 CLASS 1 OBESITY WITH SERIOUS COMORBIDITY AND BODY MASS INDEX (BMI) OF 30.0 TO 30.9 IN ADULT, UNSPECIFIED OBESITY TYPE: ICD-10-CM

## 2024-05-23 DIAGNOSIS — F33.41 RECURRENT MAJOR DEPRESSIVE DISORDER, IN PARTIAL REMISSION: ICD-10-CM

## 2024-05-23 DIAGNOSIS — E78.1 HYPERTRIGLYCERIDEMIA: Primary | ICD-10-CM

## 2024-05-23 DIAGNOSIS — I73.9 PAD (PERIPHERAL ARTERY DISEASE): ICD-10-CM

## 2024-05-23 DIAGNOSIS — E11.42 TYPE 2 DIABETES MELLITUS WITH DIABETIC POLYNEUROPATHY, WITH LONG-TERM CURRENT USE OF INSULIN: ICD-10-CM

## 2024-05-23 DIAGNOSIS — E78.2 MIXED HYPERLIPIDEMIA: ICD-10-CM

## 2024-05-23 DIAGNOSIS — I10 PRIMARY HYPERTENSION: ICD-10-CM

## 2024-05-23 DIAGNOSIS — Z12.11 COLON CANCER SCREENING: ICD-10-CM

## 2024-05-23 DIAGNOSIS — G89.4 CHRONIC PAIN SYNDROME: ICD-10-CM

## 2024-05-23 DIAGNOSIS — K21.9 GASTROESOPHAGEAL REFLUX DISEASE, UNSPECIFIED WHETHER ESOPHAGITIS PRESENT: ICD-10-CM

## 2024-05-23 PROCEDURE — 3066F NEPHROPATHY DOC TX: CPT | Mod: CPTII,,, | Performed by: STUDENT IN AN ORGANIZED HEALTH CARE EDUCATION/TRAINING PROGRAM

## 2024-05-23 PROCEDURE — 3008F BODY MASS INDEX DOCD: CPT | Mod: CPTII,,, | Performed by: STUDENT IN AN ORGANIZED HEALTH CARE EDUCATION/TRAINING PROGRAM

## 2024-05-23 PROCEDURE — 3078F DIAST BP <80 MM HG: CPT | Mod: CPTII,,, | Performed by: STUDENT IN AN ORGANIZED HEALTH CARE EDUCATION/TRAINING PROGRAM

## 2024-05-23 PROCEDURE — 3046F HEMOGLOBIN A1C LEVEL >9.0%: CPT | Mod: CPTII,,, | Performed by: STUDENT IN AN ORGANIZED HEALTH CARE EDUCATION/TRAINING PROGRAM

## 2024-05-23 PROCEDURE — 1160F RVW MEDS BY RX/DR IN RCRD: CPT | Mod: CPTII,,, | Performed by: STUDENT IN AN ORGANIZED HEALTH CARE EDUCATION/TRAINING PROGRAM

## 2024-05-23 PROCEDURE — 4010F ACE/ARB THERAPY RXD/TAKEN: CPT | Mod: CPTII,,, | Performed by: STUDENT IN AN ORGANIZED HEALTH CARE EDUCATION/TRAINING PROGRAM

## 2024-05-23 PROCEDURE — 1159F MED LIST DOCD IN RCRD: CPT | Mod: CPTII,,, | Performed by: STUDENT IN AN ORGANIZED HEALTH CARE EDUCATION/TRAINING PROGRAM

## 2024-05-23 PROCEDURE — 3074F SYST BP LT 130 MM HG: CPT | Mod: CPTII,,, | Performed by: STUDENT IN AN ORGANIZED HEALTH CARE EDUCATION/TRAINING PROGRAM

## 2024-05-23 PROCEDURE — 99214 OFFICE O/P EST MOD 30 MIN: CPT | Mod: PBBFAC | Performed by: STUDENT IN AN ORGANIZED HEALTH CARE EDUCATION/TRAINING PROGRAM

## 2024-05-23 PROCEDURE — 99214 OFFICE O/P EST MOD 30 MIN: CPT | Mod: S$PBB,,, | Performed by: STUDENT IN AN ORGANIZED HEALTH CARE EDUCATION/TRAINING PROGRAM

## 2024-05-23 PROCEDURE — 99999 PR PBB SHADOW E&M-EST. PATIENT-LVL IV: CPT | Mod: PBBFAC,,, | Performed by: STUDENT IN AN ORGANIZED HEALTH CARE EDUCATION/TRAINING PROGRAM

## 2024-05-23 RX ORDER — OMEGA-3-ACID ETHYL ESTERS 1 G/1
2 CAPSULE, LIQUID FILLED ORAL 2 TIMES DAILY
Qty: 360 CAPSULE | Refills: 3 | Status: SHIPPED | OUTPATIENT
Start: 2024-05-23 | End: 2025-05-23

## 2024-05-23 NOTE — ASSESSMENT & PLAN NOTE
Lab Results   Component Value Date    SEDRATE 27 (H) 05/26/2023    SEDRATE 24 (H) 06/11/2020    SEDRATE 19 (H) 01/09/2017    CRP 1.60 (H) 05/26/2023    CRP 26.9 (H) 06/11/2020    CRP 1.0 (H) 01/09/2017

## 2024-05-23 NOTE — ASSESSMENT & PLAN NOTE
Have right arm ulnar nerve injury.   Has been wearing wrist brace support/carpal tunnel at bed time.    Following [facility] in [place].  - follow up notes from [facility] and MRI studies  - continue Gabapentin 400 mg TID   - continue amitriptyline 100 mg QHS   - f/u neurology

## 2024-05-29 ENCOUNTER — TELEPHONE (OUTPATIENT)
Dept: PRIMARY CARE CLINIC | Facility: CLINIC | Age: 59
End: 2024-05-29
Payer: MEDICAID

## 2024-05-30 PROBLEM — F33.41 RECURRENT MAJOR DEPRESSIVE DISORDER, IN PARTIAL REMISSION: Status: ACTIVE | Noted: 2024-05-15

## 2024-05-30 RX ORDER — ROSUVASTATIN CALCIUM 40 MG/1
40 TABLET, COATED ORAL NIGHTLY
Qty: 90 TABLET | Refills: 3 | Status: SHIPPED | OUTPATIENT
Start: 2024-05-30 | End: 2025-05-30

## 2024-05-31 PROBLEM — E78.2 MIXED HYPERLIPIDEMIA: Status: ACTIVE | Noted: 2024-05-31

## 2024-05-31 NOTE — ASSESSMENT & PLAN NOTE
BP Readings from Last 3 Encounters:   05/23/24 (!) 113/58   05/15/24 (!) 99/54   05/01/24 (!) 103/55   Denies symptoms. Continue with above regimen.   - follow low sodium diet <2 grams or 2 teaspoons daily  - avoid salty and processed foods from diet  - DASH diet

## 2024-05-31 NOTE — ASSESSMENT & PLAN NOTE
8/16/23 Sonogram bilateral legs, significant stenosis involving the mid to distal left superficial femoral artery  Claudication pain always in left leg pain, but right leg started to hurt over the past several months.  Right ankle injured in 2010 hit by drill pipe with multiple fractures, s/p harware per patient.   Per patient awaiting follow up with vascular specialist  - taking daily aspirin and plavix, statin  - pletal on board  - f/u cardiology and vascular notes

## 2024-05-31 NOTE — ASSESSMENT & PLAN NOTE
Continue with daily statin, refill rosuvastatin 40 mg daily.   Patient discontinued use of zetia.   To address elevated triglycerides, will add lovaza 2g BID.   Counseled at length on following heart healthy diet  - follow whole vegetables, whole fruits, whole meats  - avoid processed foods, cut back on highly refined carbohydrates including breads, pasta, tortillas and starchy foods like white potato  - avoid all tobacco products   - maintain regular aerobic exercises

## 2024-05-31 NOTE — PROGRESS NOTES
[facility] Note    HPI:  Jayden Canada Jr. is a 59 y.o. male who presents today for lab review and Dizziness (Lab review and blood pressure check)  59 year old with hypertension, hyperlipidemia, vitamin-D deficiency, anemia, diabetes mellitus type 2, GERD, ?lupus, chronic kidney disease stage 3B, peripheral artery disease with claudication, glaucoma here for follow up uncontrolled diabetes with 11.5% from 6.4%.  Patient endorses he was away from home surrounded by alcoholic beverages and carbonated drinks and sweet tea.   Patient has resumed taking the following;  basal insulin 20U, jardiance 25 mg, metformin 1000 mg BID,   Patient endorses fasting glucose <110. Denies hypoglycemic event.  Persistently having bilateral leg pain where he has not been able to walk to his mail box without having to stop to rest.  Counseled at length on importance of following up with specialists he has seen to complete work up to determine his disease status with cardiology, neurology and rheumatology.      ROS   A review of systems was performed and was negative except as noted above.  I personally reviewed allergies, past medical, surgical, social and family history and updated as appropriate.    Medications:    Current Outpatient Medications:     albuterol (PROVENTIL/VENTOLIN HFA) 90 mcg/actuation inhaler, Inhale 1-2 puffs into the lungs every 6 (six) hours as needed for Wheezing or Shortness of Breath. Rescue, Disp: 18 g, Rfl: 5    amitriptyline (ELAVIL) 100 MG tablet, Take 1 tablet (100 mg total) by mouth every evening., Disp: 30 tablet, Rfl: 5    aspirin (ECOTRIN) 81 MG EC tablet, Take 81 mg by mouth once daily. NOTIFIED DR. GAINES OFFICE, Disp: , Rfl:     blood sugar diagnostic Strp, 1 strip by Misc.(Non-Drug; Combo Route) route 4 (four) times daily., Disp: 200 each, Rfl: 4    blood-glucose meter kit, Use as instructed, Disp: 1 each, Rfl: 0    carvediloL (COREG) 12.5 MG tablet, Take 12.5 mg by mouth 2  "(two) times daily., Disp: , Rfl:     cilostazol (PLETAL) 100 MG Tab, , Disp: , Rfl:     clopidogreL (PLAVIX) 75 mg tablet, Take 75 mg by mouth once daily. NOTIFIED DR. GAINES OFFICE, Disp: , Rfl:     dorzolamide-timolol 2-0.5% (COSOPT) 22.3-6.8 mg/mL ophthalmic solution, SMARTSI Drop(s) In Eye(s) Every 12 Hours, Disp: , Rfl:     FEROSUL 325 mg (65 mg iron) Tab tablet, Take by mouth., Disp: , Rfl:     insulin detemir U-100, Levemir, (LEVEMIR FLEXPEN) 100 unit/mL (3 mL) InPn pen, Inject 20 Units into the skin once daily., Disp: 6 mL, Rfl: 2    JARDIANCE 25 mg tablet, Take 1 tablet (25 mg total) by mouth once daily., Disp: 30 tablet, Rfl: 11    lancets Misc, 1 lancet  by Misc.(Non-Drug; Combo Route) route 4 (four) times daily., Disp: 200 each, Rfl: 4    latanoprost 0.005 % ophthalmic solution, , Disp: , Rfl:     metFORMIN (GLUCOPHAGE-XR) 500 MG ER 24hr tablet, Take 2 tablets (1,000 mg total) by mouth 2 (two) times daily with meals., Disp: 120 tablet, Rfl: 11    pen needle, diabetic 33 gauge x 5/32" Ndle, 1 each by Misc.(Non-Drug; Combo Route) route once daily., Disp: 100 each, Rfl: 1    QUEtiapine (SEROQUEL) 100 MG Tab, Take 1 tablet (100 mg total) by mouth every evening., Disp: 30 tablet, Rfl: 0    STIOLTO RESPIMAT 2.5-2.5 mcg/actuation Mist, Inhale 2 puffs into the lungs once daily. Controller, Disp: 4 g, Rfl: 11    valsartan (DIOVAN) 160 MG tablet, Take 1 tablet by mouth once daily, Disp: 90 tablet, Rfl: 3    omega-3 acid ethyl esters (LOVAZA) 1 gram capsule, Take 2 capsules (2 g total) by mouth 2 (two) times daily., Disp: 360 capsule, Rfl: 3    QUEtiapine (SEROQUEL) 100 MG Tab, Take 1 tablet (100 mg total) by mouth every evening. (Patient not taking: Reported on 2024), Disp: 30 tablet, Rfl: 5    rosuvastatin (CRESTOR) 40 MG Tab, Take 1 tablet (40 mg total) by mouth every evening., Disp: 90 tablet, Rfl: 3     Health Maintenance:  Immunization History   Administered Date(s) Administered    COVID-19 Vaccine " 03/01/2021, 04/06/2021    COVID-19, MRNA, LN-S, PF (MODERNA FULL 0.5 ML DOSE) 03/01/2021, 04/06/2021    DTP 02/23/1966, 03/30/1966, 05/04/1966, 11/27/1968, 10/28/1969, 12/05/2010    Hepatitis B, Pediatric/Adolescent 02/10/2012    IPV 05/04/1966, 04/12/1967, 05/17/1967, 10/28/1969    Influenza 10/12/2016, 08/26/2017, 01/19/2023    Influenza (FLUBLOK) - Quadrivalent - Recombinant - PF *Preferred* (egg allergy) 10/18/2023    Influenza - Quadrivalent - PF *Preferred* (6 months and older) 10/12/2016, 08/26/2017, 10/04/2018, 10/03/2019, 10/13/2021, 01/06/2022, 01/19/2023    Influenza - Trivalent (ADULT) 11/05/2020    Influenza - Trivalent - PF (ADULT) 01/06/2022, 01/19/2023    MMR 02/23/1966, 11/29/1967, 02/18/1971, 08/15/1973    Pneumococcal Conjugate - 13 Valent 11/25/2019    Pneumococcal Conjugate - 20 Valent 08/16/2022    Pneumococcal Polysaccharide - 23 Valent 10/26/2018      Health Maintenance   Topic Date Due    Foot Exam  Never done    TETANUS VACCINE  Never done    Low Dose Statin  Never done    Shingles Vaccine (1 of 2) Never done    LDCT Lung Screen  02/10/2024    Eye Exam  05/02/2024    Hemoglobin A1c  08/01/2024    PROSTATE-SPECIFIC ANTIGEN  05/01/2025    Lipid Panel  05/22/2025    Colorectal Cancer Screening  09/12/2025    Hepatitis C Screening  Completed     Health Maintenance Topics with due status: Not Due       Topic Last Completion Date    Colorectal Cancer Screening 09/12/2022    Diabetes Urine Screening 12/14/2023    PROSTATE-SPECIFIC ANTIGEN 05/01/2024    Hemoglobin A1c 05/01/2024    Lipid Panel 05/22/2024     Health Maintenance Due   Topic Date Due    Foot Exam  Never done    HIV Screening  Never done    TETANUS VACCINE  Never done    Low Dose Statin  Never done    Shingles Vaccine (1 of 2) Never done    COVID-19 Vaccine (5 - 2023-24 season) 09/01/2023    RSV Vaccine (Age 60+ and Pregnant patients) (1 - 1-dose 60+ series) Never done    LDCT Lung Screen  02/10/2024    Eye Exam  05/02/2024  "      PHYSICAL EXAM:  Vitals:    05/23/24 1141   BP: (!) 113/58   BP Location: Right arm   Patient Position: Sitting   BP Method: Large (Automatic)   Pulse: 68   Resp: 16   SpO2: 97%   Weight: 94.8 kg (209 lb)   Height: 5' 11" (1.803 m)     Body mass index is 29.15 kg/m².  Physical Exam  Vitals and nursing note reviewed.   Constitutional:       General: He is not in acute distress.     Appearance: Normal appearance. He is not ill-appearing or toxic-appearing.   HENT:      Head: Normocephalic and atraumatic.      Right Ear: External ear normal.      Left Ear: External ear normal.      Nose: Nose normal.      Mouth/Throat:      Mouth: Mucous membranes are dry.      Pharynx: Oropharynx is clear.   Eyes:      Extraocular Movements: Extraocular movements intact.      Conjunctiva/sclera: Conjunctivae normal.   Cardiovascular:      Rate and Rhythm: Normal rate and regular rhythm.      Pulses: Normal pulses.      Heart sounds: Normal heart sounds. No murmur heard.  Pulmonary:      Effort: Pulmonary effort is normal. No respiratory distress.      Breath sounds: Normal breath sounds. No wheezing or rales.   Abdominal:      General: Abdomen is flat. There is no distension.      Palpations: Abdomen is soft. There is no mass.      Tenderness: There is no abdominal tenderness. There is no right CVA tenderness, left CVA tenderness or guarding.   Musculoskeletal:         General: No swelling or tenderness.      Cervical back: Normal range of motion and neck supple. No rigidity.      Right lower leg: No edema.      Left lower leg: No edema.      Comments: No bony tenderness over cervical, thoracic, lumbar spine   Skin:     General: Skin is warm and dry.      Findings: No lesion or rash.   Neurological:      General: No focal deficit present.      Mental Status: He is alert and oriented to person, place, and time. Mental status is at baseline.      Cranial Nerves: No cranial nerve deficit.      Motor: No weakness.      Gait: Gait " normal.   Psychiatric:         Mood and Affect: Mood normal.         Behavior: Behavior normal.          ASSESSMENT/PLAN:  1. Hypertriglyceridemia  Assessment & Plan:  Continue with daily statin, refill rosuvastatin 40 mg daily.   Patient discontinued use of zetia.   To address elevated triglycerides, will add lovaza 2g BID.   Counseled at length on following heart healthy diet  - follow whole vegetables, whole fruits, whole meats  - avoid processed foods, cut back on highly refined carbohydrates including breads, pasta, tortillas and starchy foods like white potato  - avoid all tobacco products   - maintain regular aerobic exercises      Orders:  -     omega-3 acid ethyl esters (LOVAZA) 1 gram capsule; Take 2 capsules (2 g total) by mouth 2 (two) times daily.  Dispense: 360 capsule; Refill: 3    2. Mixed hyperlipidemia  -     rosuvastatin (CRESTOR) 40 MG Tab; Take 1 tablet (40 mg total) by mouth every evening.  Dispense: 90 tablet; Refill: 3    3. Primary hypertension  Overview:  - carvedilol 12.5 mg BID  - valsartan 160 mg daily  - chlorthalidone 12.5 mg daily  - maintain daily adequate hydration with at least 1.5 to 2 L water     Assessment & Plan:  BP Readings from Last 3 Encounters:   05/23/24 (!) 113/58   05/15/24 (!) 99/54   05/01/24 (!) 103/55   Denies symptoms. Continue with above regimen.   - follow low sodium diet <2 grams or 2 teaspoons daily  - avoid salty and processed foods from diet  - DASH diet          4. Type 2 diabetes mellitus with diabetic polyneuropathy, with long-term current use of insulin  Assessment & Plan:  Patient recently stopped taking all his antihyperglycemics secondary to renal function decline.   Lab Results   Component Value Date    HGBA1C 11.5 (H) 05/01/2024   Patient has discontinued metformin and insulin use along with intake of corn syrup containing food items without checking POC glucose.   Resume following regimen and follow up A1C closely.   - jardiance 25 mg daily  -  metformin 1000 mg BID  - basal insulin 20U nightly  - POC glucose fasting, post prandial 2hours, and before bedtime  - f/u A1C 3 months      5. Chronic pain syndrome  Assessment & Plan:  Lab Results   Component Value Date    SEDRATE 27 (H) 05/26/2023    SEDRATE 24 (H) 06/11/2020    SEDRATE 19 (H) 01/09/2017    CRP 1.60 (H) 05/26/2023    CRP 26.9 (H) 06/11/2020    CRP 1.0 (H) 01/09/2017         Orders:  -     C-Reactive Protein; Future; Expected date: 05/23/2024  -     Sedimentation rate; Future; Expected date: 05/23/2024  -     CINDA Screen w/Reflex; Future; Expected date: 05/23/2024    6. PAD (peripheral artery disease)  Assessment & Plan:  8/16/23 Sonogram bilateral legs, significant stenosis involving the mid to distal left superficial femoral artery  Claudication pain always in left leg pain, but right leg started to hurt over the past several months.  Right ankle injured in 2010 hit by drill pipe with multiple fractures, s/p harware per patient.   Per patient awaiting follow up with vascular specialist  - taking daily aspirin and plavix, statin  - pletal on board  - f/u cardiology and vascular notes          7. Chronic inflammatory arthritis  Assessment & Plan:  Lab Results   Component Value Date    SEDRATE 27 (H) 05/26/2023    SEDRATE 24 (H) 06/11/2020    SEDRATE 19 (H) 01/09/2017    CRP 1.60 (H) 05/26/2023    CRP 26.9 (H) 06/11/2020    CRP 1.0 (H) 01/09/2017         Orders:  -     CINDA Screen w/Reflex; Future; Expected date: 05/23/2024    8. Recurrent major depressive disorder, in partial remission  Assessment & Plan:  Denies SI/HI, harm to self or others. Patient attributes chronic pain and reduced physical endurance contributing to his depressed mood.         9. Cigar smoker  Assessment & Plan:  Smoking 3-5 cigars daily. Encouraged smoking cessation.       10. Class 1 obesity with serious comorbidity and body mass index (BMI) of 30.0 to 30.9 in adult, unspecified obesity type  Overview:  Wt Readings from Last  8 Encounters:   05/23/24 94.8 kg (209 lb)   05/15/24 95.7 kg (211 lb)   05/01/24 94.8 kg (209 lb)   12/21/23 98.4 kg (217 lb)   11/03/23 98 kg (216 lb)   10/03/23 98.2 kg (216 lb 6.4 oz)   06/21/23 98.4 kg (217 lb)   05/26/23 101.2 kg (223 lb)          Assessment & Plan:  Recommendations:   Stay physically active. As tolerated alternate resistance training with stretching and cardio. Goal of 150 minutes per week of moderate intensity activity or 7,500 - 10,000 steps per day. Follow the Mediterranean Diet. Include whole fresh fruits, vegetables, olive oil, seeds, nuts, whole grains, cold water fish, salmon, mackerel and lean cuts of meat.  Do not drink sugary/diet carbonated beverages. Decrease portion sizes slightly which will result in an approximately 500-calorie deficit. Avoid fast or fried and processed food, especially canned foods. Avoid refined carbohydrates, white starchy foods, flour, white potato, bread, muffins, and cakes. Consider substituting one meal a day with a meal replacement such as Slim fast, lean cuisine, or weight watcher's. Follow a healthy diet that includes enough calcium, vitamin D and proteins for bone health.        11. Colon cancer screening  Assessment & Plan:  9/12/22 Cologuard negative, denies blood in stool or changes in caliber, color.        12. Gastroesophageal reflux disease, unspecified whether esophagitis present  Assessment & Plan:  Takes daily prilosec 20 mg daily. Helps with symptoms. No EGD study in the past.   - f/u general surgery          Other than changes above, continue current medications and maintain follow up with specialists.      No follow-ups on file.   Recent Results (from the past 2016 hour(s))   CBC Auto Differential    Collection Time: 04/03/24 11:24 AM   Result Value Ref Range    WBC 6.80 3.90 - 12.70 K/uL    RBC 4.65 4.60 - 6.20 M/uL    Hemoglobin 13.0 (L) 14.0 - 18.0 g/dL    Hematocrit 41.0 40.0 - 54.0 %    MCV 88 82 - 98 fL    MCH 28.0 27.0 - 31.0 pg     MCHC 31.7 (L) 32.0 - 36.0 g/dL    RDW 13.2 11.5 - 14.5 %    Platelets 255 150 - 450 K/uL    MPV 11.0 9.2 - 12.9 fL    Immature Granulocytes 0.1 0.0 - 0.5 %    Gran # (ANC) 4.7 1.8 - 7.7 K/uL    Immature Grans (Abs) 0.01 0.00 - 0.04 K/uL    Lymph # 1.3 1.0 - 4.8 K/uL    Mono # 0.6 0.3 - 1.0 K/uL    Eos # 0.2 0.0 - 0.5 K/uL    Baso # 0.03 0.00 - 0.20 K/uL    nRBC 0 0 /100 WBC    Gran % 68.9 38.0 - 73.0 %    Lymph % 19.3 18.0 - 48.0 %    Mono % 8.4 4.0 - 15.0 %    Eosinophil % 2.9 0.0 - 8.0 %    Basophil % 0.4 0.0 - 1.9 %    Differential Method Automated    Renal Function Panel    Collection Time: 04/03/24 11:24 AM   Result Value Ref Range    Glucose 348 (H) 70 - 110 mg/dL    Sodium 134 (L) 136 - 145 mmol/L    Potassium 3.6 3.5 - 5.1 mmol/L    Chloride 100 95 - 110 mmol/L    CO2 27 23 - 29 mmol/L    BUN 13 6 - 20 mg/dL    Calcium 9.3 8.7 - 10.5 mg/dL    Creatinine 1.3 0.5 - 1.4 mg/dL    Albumin 3.6 3.5 - 5.2 g/dL    Phosphorus 4.8 (H) 2.7 - 4.5 mg/dL    eGFR >60.0 >60 mL/min/1.73 m^2    Anion Gap 7 (L) 8 - 16 mmol/L   Protein/Creatinine Ratio, Urine    Collection Time: 04/03/24 11:26 AM   Result Value Ref Range    Protein, Urine Random <7 0 - 15 mg/dL    Creatinine, Urine 36.1 23.0 - 375.0 mg/dL    Prot/Creat Ratio, Urine Unable to calculate 0.00 - 0.20   Comprehensive Metabolic Panel    Collection Time: 05/01/24  3:37 PM   Result Value Ref Range    Sodium 129 (L) 136 - 145 mmol/L    Potassium 3.9 3.5 - 5.1 mmol/L    Chloride 93 (L) 95 - 110 mmol/L    CO2 27 23 - 29 mmol/L    Glucose 658 (HH) 70 - 110 mg/dL    BUN 25 (H) 6 - 20 mg/dL    Creatinine 1.8 (H) 0.5 - 1.4 mg/dL    Calcium 8.8 8.7 - 10.5 mg/dL    Total Protein 7.9 6.0 - 8.4 g/dL    Albumin 3.6 3.5 - 5.2 g/dL    Total Bilirubin 0.7 0.1 - 1.0 mg/dL    Alkaline Phosphatase 142 (H) 55 - 135 U/L    AST 20 10 - 40 U/L    ALT 31 10 - 44 U/L    eGFR 42.6 (A) >60 mL/min/1.73 m^2    Anion Gap 9 3 - 11 mmol/L   Iron and TIBC    Collection Time: 05/01/24  3:37 PM   Result  Value Ref Range    Iron 75 45 - 160 ug/dL    TIBC 278 250 - 450 ug/dL    Iron Saturation 27 20 - 50 %   PSA, Screening    Collection Time: 05/01/24  3:37 PM   Result Value Ref Range    PSA, Screen 0.60 0.00 - 4.00 ng/mL   Vitamin D    Collection Time: 05/01/24  3:37 PM   Result Value Ref Range    Vit D, 25-Hydroxy 13 (L) 30 - 96 ng/mL   TSH    Collection Time: 05/01/24  3:37 PM   Result Value Ref Range    TSH 0.868 0.400 - 4.000 uIU/mL   Ferritin    Collection Time: 05/01/24  3:37 PM   Result Value Ref Range    Ferritin 357 (H) 20.0 - 300.0 ng/mL   Vitamin B12    Collection Time: 05/01/24  3:37 PM   Result Value Ref Range    Vitamin B-12 590 210 - 950 pg/mL   Lipid Panel    Collection Time: 05/01/24  3:37 PM   Result Value Ref Range    Cholesterol 129 120 - 199 mg/dL    Triglycerides 337 (H) 30 - 150 mg/dL    HDL 27 (L) 40 - 75 mg/dL    LDL Cholesterol 34.6 (L) 63.0 - 159.0 mg/dL    HDL/Cholesterol Ratio 20.9 20.0 - 50.0 %    Total Cholesterol/HDL Ratio 4.8 2.0 - 5.0    Non-HDL Cholesterol 102 mg/dL   Hemoglobin A1C    Collection Time: 05/01/24  3:37 PM   Result Value Ref Range    Hemoglobin A1C 11.5 (H) 4.0 - 5.6 %    Estimated Avg Glucose 283 (H) 68 - 131 mg/dL   Magnesium    Collection Time: 05/15/24  3:45 PM   Result Value Ref Range    Magnesium 2.7 (H) 1.6 - 2.6 mg/dL   Comprehensive Metabolic Panel    Collection Time: 05/15/24  3:45 PM   Result Value Ref Range    Sodium 138 136 - 145 mmol/L    Potassium 4.0 3.5 - 5.1 mmol/L    Chloride 103 95 - 110 mmol/L    CO2 28 23 - 29 mmol/L    Glucose 112 (H) 70 - 110 mg/dL    BUN 25 (H) 6 - 20 mg/dL    Creatinine 1.8 (H) 0.5 - 1.4 mg/dL    Calcium 9.5 8.7 - 10.5 mg/dL    Total Protein 7.5 6.0 - 8.4 g/dL    Albumin 3.5 3.5 - 5.2 g/dL    Total Bilirubin 0.9 0.1 - 1.0 mg/dL    Alkaline Phosphatase 106 55 - 135 U/L    AST 14 10 - 40 U/L    ALT 24 10 - 44 U/L    eGFR 42.6 (A) >60 mL/min/1.73 m^2    Anion Gap 7 3 - 11 mmol/L   Vitamin B12    Collection Time: 05/15/24  3:45 PM    Result Value Ref Range    Vitamin B-12 619 210 - 950 pg/mL   Ferritin    Collection Time: 05/15/24  3:45 PM   Result Value Ref Range    Ferritin 326 (H) 20.0 - 300.0 ng/mL   Iron and TIBC    Collection Time: 05/15/24  3:45 PM   Result Value Ref Range    Iron 66 45 - 160 ug/dL    TIBC 280 250 - 450 ug/dL    Iron Saturation 24 20 - 50 %   Comprehensive Metabolic Panel    Collection Time: 05/22/24 11:18 AM   Result Value Ref Range    Sodium 136 136 - 145 mmol/L    Potassium 3.7 3.5 - 5.1 mmol/L    Chloride 104 95 - 110 mmol/L    CO2 24 23 - 29 mmol/L    Glucose 138 (H) 70 - 110 mg/dL    BUN 18 6 - 20 mg/dL    Creatinine 1.4 0.5 - 1.4 mg/dL    Calcium 9.8 8.7 - 10.5 mg/dL    Total Protein 7.2 6.0 - 8.4 g/dL    Albumin 3.4 (L) 3.5 - 5.2 g/dL    Total Bilirubin 0.9 0.1 - 1.0 mg/dL    Alkaline Phosphatase 90 55 - 135 U/L    AST 11 10 - 40 U/L    ALT 10 10 - 44 U/L    eGFR 57.5 (A) >60 mL/min/1.73 m^2    Anion Gap 8 8 - 16 mmol/L   Lipid Panel    Collection Time: 05/22/24 11:18 AM   Result Value Ref Range    Cholesterol 126 120 - 199 mg/dL    Triglycerides 295 (H) 30 - 150 mg/dL    HDL 19 (L) 40 - 75 mg/dL    LDL Cholesterol 48.0 (L) 63.0 - 159.0 mg/dL    HDL/Cholesterol Ratio 15.1 (L) 20.0 - 50.0 %    Total Cholesterol/HDL Ratio 6.6 (H) 2.0 - 5.0    Non-HDL Cholesterol 107 mg/dL         Kazumi G Yoshinaga, DO [facility]

## 2024-05-31 NOTE — ASSESSMENT & PLAN NOTE
Denies SI/HI, harm to self or others. Patient attributes chronic pain and reduced physical endurance contributing to his depressed mood.

## 2024-06-04 RX ORDER — CHLORTHALIDONE 25 MG/1
TABLET ORAL
COMMUNITY
Start: 2024-05-26

## 2024-06-18 ENCOUNTER — PATIENT OUTREACH (OUTPATIENT)
Dept: ADMINISTRATIVE | Facility: HOSPITAL | Age: 59
End: 2024-06-18
Payer: MEDICAID

## 2024-06-18 DIAGNOSIS — F17.200 ENCOUNTER FOR SCREENING FOR MALIGNANT NEOPLASM OF LUNG IN CURRENT SMOKER WITH 30 PACK YEAR HISTORY OR GREATER: ICD-10-CM

## 2024-06-18 DIAGNOSIS — Z79.4 TYPE 2 DIABETES MELLITUS WITHOUT COMPLICATION, WITH LONG-TERM CURRENT USE OF INSULIN: Primary | ICD-10-CM

## 2024-06-18 DIAGNOSIS — Z12.2 ENCOUNTER FOR SCREENING FOR MALIGNANT NEOPLASM OF LUNG IN CURRENT SMOKER WITH 30 PACK YEAR HISTORY OR GREATER: ICD-10-CM

## 2024-06-18 DIAGNOSIS — E11.9 TYPE 2 DIABETES MELLITUS WITHOUT COMPLICATION, WITH LONG-TERM CURRENT USE OF INSULIN: Primary | ICD-10-CM

## 2024-07-18 ENCOUNTER — TELEPHONE (OUTPATIENT)
Dept: ENDOCRINOLOGY | Facility: CLINIC | Age: 59
End: 2024-07-18
Payer: MEDICAID

## 2024-07-30 ENCOUNTER — OFFICE VISIT (OUTPATIENT)
Dept: ENDOCRINOLOGY | Facility: CLINIC | Age: 59
End: 2024-07-30
Payer: MEDICAID

## 2024-07-30 VITALS
SYSTOLIC BLOOD PRESSURE: 130 MMHG | DIASTOLIC BLOOD PRESSURE: 61 MMHG | HEART RATE: 78 BPM | OXYGEN SATURATION: 99 % | WEIGHT: 202 LBS | BODY MASS INDEX: 28.17 KG/M2

## 2024-07-30 DIAGNOSIS — E11.42 TYPE 2 DIABETES MELLITUS WITH DIABETIC POLYNEUROPATHY, WITH LONG-TERM CURRENT USE OF INSULIN: Primary | ICD-10-CM

## 2024-07-30 DIAGNOSIS — E78.2 MIXED HYPERLIPIDEMIA: ICD-10-CM

## 2024-07-30 DIAGNOSIS — Z79.4 TYPE 2 DIABETES MELLITUS WITH DIABETIC POLYNEUROPATHY, WITH LONG-TERM CURRENT USE OF INSULIN: Primary | ICD-10-CM

## 2024-07-30 DIAGNOSIS — D53.9 NUTRITIONAL ANEMIA: Primary | ICD-10-CM

## 2024-07-30 DIAGNOSIS — D50.9 IRON DEFICIENCY ANEMIA, UNSPECIFIED IRON DEFICIENCY ANEMIA TYPE: ICD-10-CM

## 2024-07-30 DIAGNOSIS — E66.9 CLASS 1 OBESITY WITH SERIOUS COMORBIDITY AND BODY MASS INDEX (BMI) OF 30.0 TO 30.9 IN ADULT, UNSPECIFIED OBESITY TYPE: ICD-10-CM

## 2024-07-30 PROCEDURE — 99214 OFFICE O/P EST MOD 30 MIN: CPT | Mod: PBBFAC | Performed by: PHYSICIAN ASSISTANT

## 2024-07-30 PROCEDURE — 1159F MED LIST DOCD IN RCRD: CPT | Mod: CPTII,,, | Performed by: PHYSICIAN ASSISTANT

## 2024-07-30 PROCEDURE — 3078F DIAST BP <80 MM HG: CPT | Mod: CPTII,,, | Performed by: PHYSICIAN ASSISTANT

## 2024-07-30 PROCEDURE — 3066F NEPHROPATHY DOC TX: CPT | Mod: CPTII,,, | Performed by: PHYSICIAN ASSISTANT

## 2024-07-30 PROCEDURE — 3075F SYST BP GE 130 - 139MM HG: CPT | Mod: CPTII,,, | Performed by: PHYSICIAN ASSISTANT

## 2024-07-30 PROCEDURE — 3008F BODY MASS INDEX DOCD: CPT | Mod: CPTII,,, | Performed by: PHYSICIAN ASSISTANT

## 2024-07-30 PROCEDURE — 99999 PR PBB SHADOW E&M-EST. PATIENT-LVL IV: CPT | Mod: PBBFAC,,, | Performed by: PHYSICIAN ASSISTANT

## 2024-07-30 PROCEDURE — 99214 OFFICE O/P EST MOD 30 MIN: CPT | Mod: S$PBB,,, | Performed by: PHYSICIAN ASSISTANT

## 2024-07-30 PROCEDURE — 3046F HEMOGLOBIN A1C LEVEL >9.0%: CPT | Mod: CPTII,,, | Performed by: PHYSICIAN ASSISTANT

## 2024-07-30 PROCEDURE — 4010F ACE/ARB THERAPY RXD/TAKEN: CPT | Mod: CPTII,,, | Performed by: PHYSICIAN ASSISTANT

## 2024-07-30 RX ORDER — BLOOD-GLUCOSE,RECEIVER,CONT
EACH MISCELLANEOUS
Qty: 1 EACH | Refills: 0 | Status: SHIPPED | OUTPATIENT
Start: 2024-07-30

## 2024-07-30 RX ORDER — BLOOD-GLUCOSE SENSOR
1 EACH MISCELLANEOUS
Qty: 3 EACH | Refills: 11 | Status: SHIPPED | OUTPATIENT
Start: 2024-07-30 | End: 2025-07-30

## 2024-07-30 NOTE — ASSESSMENT & PLAN NOTE
Glucose uncontrolled with last A1c of 11.5%. He has not followed up since 2022. He was off all medication for months, but recently restarted 2 months ago. He has had sporadic episodes of hypoglycemia <54 and is on insulin. Will start Dexcom and bring patient back in 1 week to adjust medications.     Plan  - Continue Levemir 20 U daily  - Continue Metformin 1,000 mg BID  - Continue Jardiance 25 mg daily    F/u 2 weeks for CGM review

## 2024-07-30 NOTE — PROGRESS NOTES
Subjective:      Patient ID: Jayden Canada Jr. is a 59 y.o. male.    Chief Complaint:  Gynecomastia    History of Present Illness  This is a 59 y.o. male. with a past medical history of T2DM, HTN, HLD, PVD here for evaluation of T2DM.      Type 2 diabetes mellitus    He was off all diabetes medications until late May. He restarted Levemir, metformin, and Jardiance 2 months ago after having an elevated A1c of 11.5%    Current diabetes medications:  - Levemir 20 U AM  - Metformin 1,000 mg BID  - Jardiance 25 mg daily    Past diabetes medications:  - None     He was off all medications for a few months and restarted 2 months ago.     Known diabetic complications: none    Weight trend:  Wt Readings from Last 5 Encounters:   03/10/22 107.7 kg (237 lb 7 oz)   12/02/21 107.5 kg (237 lb)   06/24/21 97.2 kg (214 lb 4.6 oz)   05/05/21 99.8 kg (220 lb)   01/23/21 112.5 kg (248 lb)       Blood glucose monitoring at home: 2x/daily  mostly  Any episodes of hypoglycemia? He has sporadic hypoglycemia <54    Diabetes Management Status  Statin: Not taking  ACE/ARB: Taking    Outside labs  12/21/21  HbA1c 6.4%  Hb 12.3, Hct 40  PSA 0.6  Vit D 24  UACR < 3 mg/g  TSH 2.3, FT4 1.03  , Tg 211, HDL 29, LDL 84  BUN 14, Cr 1.1     Review of Systems  As above    Social and family history reviewed  Current medications and allergies reviewed    Objective:   /61 (BP Location: Right arm, Patient Position: Sitting)   Pulse 78   Wt 91.6 kg (202 lb)   SpO2 99%   BMI 28.17 kg/m²   Physical Exam  Alert, oriented  Nontender gynecomastia on L side only    BP Readings from Last 1 Encounters:   07/30/24 130/61      Wt Readings from Last 1 Encounters:   07/30/24 1303 91.6 kg (202 lb)     Body mass index is 28.17 kg/m².    Lab Review:   Lab Results   Component Value Date    HGBA1C 11.5 (H) 05/01/2024     Lab Results   Component Value Date    CHOL 126 05/22/2024    HDL 19 (L) 05/22/2024    LDLCALC 48.0 (L) 05/22/2024    TRIG 295  (H) 05/22/2024    CHOLHDL 15.1 (L) 05/22/2024     Lab Results   Component Value Date     05/22/2024    K 3.7 05/22/2024     05/22/2024    CO2 24 05/22/2024     (H) 05/22/2024    BUN 18 05/22/2024    CREATININE 1.4 05/22/2024    CALCIUM 9.8 05/22/2024    PROT 7.2 05/22/2024    ALBUMIN 3.4 (L) 05/22/2024    BILITOT 0.9 05/22/2024    ALKPHOS 90 05/22/2024    AST 11 05/22/2024    ALT 10 05/22/2024    ANIONGAP 8 05/22/2024    ESTGFRAFRICA 54.1 (A) 04/07/2022    EGFRNONAA 46.8 (A) 04/07/2022    TSH 0.868 05/01/2024       All pertinent labs reviewed    Assessment and Plan     Type 2 diabetes mellitus with diabetic polyneuropathy, with long-term current use of insulin  Glucose uncontrolled with last A1c of 11.5%. He has not followed up since 2022. He was off all medication for months, but recently restarted 2 months ago. He has had sporadic episodes of hypoglycemia <54 and is on insulin. Will start Dexcom and bring patient back in 1 week to adjust medications.     Plan  - Continue Levemir 20 U daily  - Continue Metformin 1,000 mg BID  - Continue Jardiance 25 mg daily    F/u 2 weeks for CGM review    Class 1 obesity with serious comorbidity and body mass index (BMI) of 30.0 to 30.9 in adult  Could consider starting GLP-1 RA for glucose control pending CGM review    Mixed hyperlipidemia  Continue Statin managed by PCP        Follow-up 1 week for CGM review    Yuko Baptiste PA-C  Endocrinology

## 2024-07-31 RX ORDER — FERROUS SULFATE TAB 325 MG (65 MG ELEMENTAL FE) 325 (65 FE) MG
325 TAB ORAL DAILY
Qty: 30 TABLET | Refills: 5 | Status: SHIPPED | OUTPATIENT
Start: 2024-07-31

## 2024-08-02 ENCOUNTER — HOSPITAL ENCOUNTER (OUTPATIENT)
Dept: RADIOLOGY | Facility: HOSPITAL | Age: 59
Discharge: HOME | End: 2024-08-02
Attending: STUDENT IN AN ORGANIZED HEALTH CARE EDUCATION/TRAINING PROGRAM
Payer: MEDICAID

## 2024-08-02 DIAGNOSIS — F17.200 ENCOUNTER FOR SCREENING FOR MALIGNANT NEOPLASM OF LUNG IN CURRENT SMOKER WITH 30 PACK YEAR HISTORY OR GREATER: ICD-10-CM

## 2024-08-02 DIAGNOSIS — Z12.2 ENCOUNTER FOR SCREENING FOR MALIGNANT NEOPLASM OF LUNG IN CURRENT SMOKER WITH 30 PACK YEAR HISTORY OR GREATER: ICD-10-CM

## 2024-08-02 DIAGNOSIS — R91.1 PULMONARY NODULE: Primary | ICD-10-CM

## 2024-08-02 PROCEDURE — 71271 CT THORAX LUNG CANCER SCR C-: CPT | Mod: TC

## 2024-09-25 DIAGNOSIS — G47.00 INSOMNIA, UNSPECIFIED TYPE: ICD-10-CM

## 2024-09-27 RX ORDER — QUETIAPINE FUMARATE 100 MG/1
100 TABLET, FILM COATED ORAL NIGHTLY
Qty: 30 TABLET | Refills: 5 | Status: SHIPPED | OUTPATIENT
Start: 2024-09-27

## 2024-10-22 ENCOUNTER — OFFICE VISIT (OUTPATIENT)
Dept: PRIMARY CARE CLINIC | Facility: CLINIC | Age: 59
End: 2024-10-22
Payer: MEDICAID

## 2024-10-22 VITALS
BODY MASS INDEX: 29.12 KG/M2 | WEIGHT: 208 LBS | OXYGEN SATURATION: 98 % | HEIGHT: 71 IN | HEART RATE: 80 BPM | SYSTOLIC BLOOD PRESSURE: 117 MMHG | DIASTOLIC BLOOD PRESSURE: 78 MMHG

## 2024-10-22 DIAGNOSIS — G63 POLYNEUROPATHY DUE TO MEDICAL CONDITION: ICD-10-CM

## 2024-10-22 DIAGNOSIS — N18.32 STAGE 3B CHRONIC KIDNEY DISEASE: ICD-10-CM

## 2024-10-22 DIAGNOSIS — Z91.199 COMPLIANCE POOR: ICD-10-CM

## 2024-10-22 DIAGNOSIS — G47.00 INSOMNIA, UNSPECIFIED TYPE: ICD-10-CM

## 2024-10-22 DIAGNOSIS — I10 PRIMARY HYPERTENSION: ICD-10-CM

## 2024-10-22 DIAGNOSIS — R91.8 PULMONARY NODULES: ICD-10-CM

## 2024-10-22 DIAGNOSIS — E78.2 MIXED HYPERLIPIDEMIA: ICD-10-CM

## 2024-10-22 DIAGNOSIS — E78.1 HYPERTRIGLYCERIDEMIA: ICD-10-CM

## 2024-10-22 DIAGNOSIS — I73.9 BILATERAL CLAUDICATION OF LOWER LIMB: Primary | ICD-10-CM

## 2024-10-22 DIAGNOSIS — I73.9 PAD (PERIPHERAL ARTERY DISEASE): ICD-10-CM

## 2024-10-22 DIAGNOSIS — E11.9 TYPE 2 DIABETES MELLITUS WITHOUT COMPLICATION, WITH LONG-TERM CURRENT USE OF INSULIN: ICD-10-CM

## 2024-10-22 DIAGNOSIS — D64.9 ANEMIA, UNSPECIFIED TYPE: ICD-10-CM

## 2024-10-22 DIAGNOSIS — F17.290 CIGAR SMOKER: ICD-10-CM

## 2024-10-22 DIAGNOSIS — Z79.4 TYPE 2 DIABETES MELLITUS WITHOUT COMPLICATION, WITH LONG-TERM CURRENT USE OF INSULIN: ICD-10-CM

## 2024-10-22 LAB
ALBUMIN SERPL BCP-MCNC: 3.5 G/DL (ref 3.5–5.2)
ALP SERPL-CCNC: 108 U/L (ref 55–135)
ALT SERPL W/O P-5'-P-CCNC: 20 U/L (ref 10–44)
ANION GAP SERPL CALC-SCNC: 5 MMOL/L (ref 3–11)
AST SERPL-CCNC: 11 U/L (ref 10–40)
BASOPHILS # BLD AUTO: 0.04 K/UL (ref 0–0.2)
BASOPHILS NFR BLD: 0.6 % (ref 0–1.9)
BILIRUB SERPL-MCNC: 0.3 MG/DL (ref 0.1–1)
BUN SERPL-MCNC: 16 MG/DL (ref 6–20)
CALCIUM SERPL-MCNC: 8.9 MG/DL (ref 8.7–10.5)
CHLORIDE SERPL-SCNC: 101 MMOL/L (ref 95–110)
CO2 SERPL-SCNC: 32 MMOL/L (ref 23–29)
CREAT SERPL-MCNC: 1.2 MG/DL (ref 0.5–1.4)
DIFFERENTIAL METHOD BLD: ABNORMAL
EOSINOPHIL # BLD AUTO: 0.2 K/UL (ref 0–0.5)
EOSINOPHIL NFR BLD: 2.5 % (ref 0–8)
ERYTHROCYTE [DISTWIDTH] IN BLOOD BY AUTOMATED COUNT: 16.1 % (ref 11.5–14.5)
EST. GFR  (NO RACE VARIABLE): >60 ML/MIN/1.73 M^2
GLUCOSE SERPL-MCNC: 99 MG/DL (ref 70–110)
HCT VFR BLD AUTO: 42.3 % (ref 40–54)
HGB BLD-MCNC: 12.7 G/DL (ref 14–18)
IMM GRANULOCYTES # BLD AUTO: 0.02 K/UL (ref 0–0.04)
IMM GRANULOCYTES NFR BLD AUTO: 0.3 % (ref 0–0.5)
LYMPHOCYTES # BLD AUTO: 1.3 K/UL (ref 1–4.8)
LYMPHOCYTES NFR BLD: 20.4 % (ref 18–48)
MCH RBC QN AUTO: 26.6 PG (ref 27–31)
MCHC RBC AUTO-ENTMCNC: 30 G/DL (ref 32–36)
MCV RBC AUTO: 89 FL (ref 82–98)
MONOCYTES # BLD AUTO: 0.5 K/UL (ref 0.3–1)
MONOCYTES NFR BLD: 7.4 % (ref 4–15)
NEUTROPHILS # BLD AUTO: 4.4 K/UL (ref 1.8–7.7)
NEUTROPHILS NFR BLD: 68.8 % (ref 38–73)
NRBC BLD-RTO: 0 /100 WBC
PLATELET # BLD AUTO: 163 K/UL (ref 150–450)
PMV BLD AUTO: 11.4 FL (ref 9.2–12.9)
POTASSIUM SERPL-SCNC: 3.8 MMOL/L (ref 3.5–5.1)
PROT SERPL-MCNC: 7.6 G/DL (ref 6–8.4)
RBC # BLD AUTO: 4.77 M/UL (ref 4.6–6.2)
SODIUM SERPL-SCNC: 138 MMOL/L (ref 136–145)
WBC # BLD AUTO: 6.46 K/UL (ref 3.9–12.7)

## 2024-10-22 PROCEDURE — 3008F BODY MASS INDEX DOCD: CPT | Mod: CPTII,,, | Performed by: STUDENT IN AN ORGANIZED HEALTH CARE EDUCATION/TRAINING PROGRAM

## 2024-10-22 PROCEDURE — 1159F MED LIST DOCD IN RCRD: CPT | Mod: CPTII,,, | Performed by: STUDENT IN AN ORGANIZED HEALTH CARE EDUCATION/TRAINING PROGRAM

## 2024-10-22 PROCEDURE — 3074F SYST BP LT 130 MM HG: CPT | Mod: CPTII,,, | Performed by: STUDENT IN AN ORGANIZED HEALTH CARE EDUCATION/TRAINING PROGRAM

## 2024-10-22 PROCEDURE — 3066F NEPHROPATHY DOC TX: CPT | Mod: CPTII,,, | Performed by: STUDENT IN AN ORGANIZED HEALTH CARE EDUCATION/TRAINING PROGRAM

## 2024-10-22 PROCEDURE — 99215 OFFICE O/P EST HI 40 MIN: CPT | Mod: S$PBB,,, | Performed by: STUDENT IN AN ORGANIZED HEALTH CARE EDUCATION/TRAINING PROGRAM

## 2024-10-22 PROCEDURE — 3044F HG A1C LEVEL LT 7.0%: CPT | Mod: CPTII,,, | Performed by: STUDENT IN AN ORGANIZED HEALTH CARE EDUCATION/TRAINING PROGRAM

## 2024-10-22 PROCEDURE — 4010F ACE/ARB THERAPY RXD/TAKEN: CPT | Mod: CPTII,,, | Performed by: STUDENT IN AN ORGANIZED HEALTH CARE EDUCATION/TRAINING PROGRAM

## 2024-10-22 PROCEDURE — 1160F RVW MEDS BY RX/DR IN RCRD: CPT | Mod: CPTII,,, | Performed by: STUDENT IN AN ORGANIZED HEALTH CARE EDUCATION/TRAINING PROGRAM

## 2024-10-22 PROCEDURE — 3078F DIAST BP <80 MM HG: CPT | Mod: CPTII,,, | Performed by: STUDENT IN AN ORGANIZED HEALTH CARE EDUCATION/TRAINING PROGRAM

## 2024-10-22 PROCEDURE — 99999 PR PBB SHADOW E&M-EST. PATIENT-LVL IV: CPT | Mod: PBBFAC,,, | Performed by: STUDENT IN AN ORGANIZED HEALTH CARE EDUCATION/TRAINING PROGRAM

## 2024-10-22 PROCEDURE — 85025 COMPLETE CBC W/AUTO DIFF WBC: CPT | Performed by: STUDENT IN AN ORGANIZED HEALTH CARE EDUCATION/TRAINING PROGRAM

## 2024-10-22 PROCEDURE — 80053 COMPREHEN METABOLIC PANEL: CPT | Performed by: STUDENT IN AN ORGANIZED HEALTH CARE EDUCATION/TRAINING PROGRAM

## 2024-10-22 PROCEDURE — 99214 OFFICE O/P EST MOD 30 MIN: CPT | Mod: PBBFAC | Performed by: STUDENT IN AN ORGANIZED HEALTH CARE EDUCATION/TRAINING PROGRAM

## 2024-10-22 RX ORDER — CLOPIDOGREL BISULFATE 75 MG/1
75 TABLET ORAL DAILY
Qty: 30 TABLET | Refills: 11 | Status: SHIPPED | OUTPATIENT
Start: 2024-10-22

## 2024-10-22 RX ORDER — GABAPENTIN 400 MG/1
400 CAPSULE ORAL 2 TIMES DAILY
Qty: 60 CAPSULE | Refills: 11 | Status: SHIPPED | OUTPATIENT
Start: 2024-10-22 | End: 2025-10-22

## 2024-10-22 RX ORDER — INSULIN DETEMIR 100 [IU]/ML
20 INJECTION, SOLUTION SUBCUTANEOUS DAILY
Qty: 6 ML | Refills: 2 | Status: SHIPPED | OUTPATIENT
Start: 2024-10-22

## 2024-10-22 RX ORDER — METFORMIN HYDROCHLORIDE 500 MG/1
1000 TABLET, EXTENDED RELEASE ORAL 2 TIMES DAILY WITH MEALS
Qty: 120 TABLET | Refills: 11 | Status: SHIPPED | OUTPATIENT
Start: 2024-10-22

## 2024-10-22 RX ORDER — EMPAGLIFLOZIN 25 MG/1
25 TABLET, FILM COATED ORAL DAILY
Qty: 30 TABLET | Refills: 11 | Status: SHIPPED | OUTPATIENT
Start: 2024-10-22 | End: 2025-10-22

## 2024-10-22 NOTE — ASSESSMENT & PLAN NOTE
8/16/23 Sonogram bilateral legs, significant stenosis involving the mid to distal left superficial femoral artery  Claudication pain always in left leg pain, but right leg started to hurt over the past several months.  Right ankle injured in 2010 hit by drill pipe with multiple fractures, s/p harware per patient.   10/22/24 Leg pain worsening affecting patient's ambulation after a few steps. Per patient awaiting follow up with vascular specialist  - taking daily aspirin and plavix, statin  - pletal on board

## 2024-10-22 NOTE — ASSESSMENT & PLAN NOTE
Lab Results   Component Value Date    CREATININE 1.4 05/22/2024    BUN 18 05/22/2024     05/22/2024    K 3.7 05/22/2024     05/22/2024    CO2 24 05/22/2024   Stable renal function. Cr within normal limits.  CrCl cannot be calculated (Patient's most recent lab result is older than the maximum 7 days allowed.).  - maintain daily adequate hydration  - avoid nephrotoxic medications including use of NSAIDs  - f/u nephrology specialist

## 2024-10-22 NOTE — ASSESSMENT & PLAN NOTE
BP Readings from Last 3 Encounters:   10/22/24 117/78   07/30/24 130/61   05/23/24 (!) 113/58   Denies symptoms. Continue with above regimen.   - follow low sodium diet <2 grams or 2 teaspoons daily  - avoid salty and processed foods from diet  - DASH diet

## 2024-10-22 NOTE — PROGRESS NOTES
[facility] Note    HPI:  Jayden Canada Jr. is a 59 y.o. male who presents today for Health Maintenance (Health maintenance)  59 year old with hypertension, hyperlipidemia, vitamin-D deficiency, anemia, diabetes mellitus type 2, GERD, ?lupus, chronic kidney disease stage 3B, peripheral artery disease with claudication, glaucoma here for follow up uncontrolled diabetes with 11.5% from 6.4%.   Bilateral leg pain continues to present and patient is only able to tolerate a few steps before he needs to rest.   Last visit with endocrinology, patient started on Dexcom CGM for glucose monitoring and control.   - Levemir 20U daily, metformin 1000 mg BID, Jardiance 25 mg daily  Per patient still awaiting follow up appointment with cardiology and vascular specialists.   Patient needs follow up referral status.     ROS   A review of systems was performed and was negative except as noted above.    I personally reviewed allergies, past medical, surgical, social and family history and updated as appropriate.    Medications:    Current Outpatient Medications:     albuterol (PROVENTIL/VENTOLIN HFA) 90 mcg/actuation inhaler, Inhale 1-2 puffs into the lungs every 6 (six) hours as needed for Wheezing or Shortness of Breath. Rescue, Disp: 18 g, Rfl: 5    amitriptyline (ELAVIL) 100 MG tablet, Take 1 tablet (100 mg total) by mouth every evening., Disp: 30 tablet, Rfl: 5    aspirin (ECOTRIN) 81 MG EC tablet, Take 81 mg by mouth once daily. NOTIFIED DR. GAINES OFFICE, Disp: , Rfl:     carvediloL (COREG) 12.5 MG tablet, Take 12.5 mg by mouth 2 (two) times daily., Disp: , Rfl:     DEXCOM G7  Misc, Use to check glucose with sensors, Disp: 1 each, Rfl: 0    DEXCOM G7 SENSOR Chery, 1 Device by Misc.(Non-Drug; Combo Route) route every 10 days., Disp: 3 each, Rfl: 11    dorzolamide-timolol 2-0.5% (COSOPT) 22.3-6.8 mg/mL ophthalmic solution, SMARTSI Drop(s) In Eye(s) Every 12 Hours, Disp: , Rfl:     FEROSUL 325 mg  (65 mg iron) Tab tablet, Take 1 tablet (325 mg total) by mouth once daily., Disp: 30 tablet, Rfl: 5    lancets Misc, 1 lancet  by Misc.(Non-Drug; Combo Route) route 4 (four) times daily., Disp: 200 each, Rfl: 4    latanoprost 0.005 % ophthalmic solution, , Disp: , Rfl:     omega-3 acid ethyl esters (LOVAZA) 1 gram capsule, Take 2 capsules (2 g total) by mouth 2 (two) times daily., Disp: 360 capsule, Rfl: 3    QUEtiapine (SEROQUEL) 100 MG Tab, Take 1 tablet (100 mg total) by mouth every evening., Disp: 30 tablet, Rfl: 5    rosuvastatin (CRESTOR) 40 MG Tab, Take 1 tablet (40 mg total) by mouth every evening., Disp: 90 tablet, Rfl: 3    STIOLTO RESPIMAT 2.5-2.5 mcg/actuation Mist, Inhale 2 puffs into the lungs once daily. Controller, Disp: 4 g, Rfl: 11    valsartan (DIOVAN) 160 MG tablet, Take 1 tablet by mouth once daily, Disp: 90 tablet, Rfl: 3    clopidogreL (PLAVIX) 75 mg tablet, Take 1 tablet (75 mg total) by mouth once daily. NOTIFIED DR. GAINES OFFICE, Disp: 30 tablet, Rfl: 11    gabapentin (NEURONTIN) 400 MG capsule, Take 1 capsule (400 mg total) by mouth 2 (two) times daily., Disp: 60 capsule, Rfl: 11    insulin detemir U-100, Levemir, (LEVEMIR FLEXPEN) 100 unit/mL (3 mL) InPn pen, Inject 20 Units into the skin once daily., Disp: 6 mL, Rfl: 2    JARDIANCE 25 mg tablet, Take 1 tablet (25 mg total) by mouth once daily., Disp: 30 tablet, Rfl: 11    metFORMIN (GLUCOPHAGE-XR) 500 MG ER 24hr tablet, Take 2 tablets (1,000 mg total) by mouth 2 (two) times daily with meals., Disp: 120 tablet, Rfl: 11     Health Maintenance:  Immunization History   Administered Date(s) Administered    COVID-19 Vaccine 03/01/2021, 04/06/2021    COVID-19, MRNA, LN-S, PF (MODERNA FULL 0.5 ML DOSE) 03/01/2021, 04/06/2021    DTP 02/23/1966, 03/30/1966, 05/04/1966, 11/27/1968, 10/28/1969, 12/05/2010    Hepatitis B, Pediatric/Adolescent 02/10/2012    IPV 05/04/1966, 04/12/1967, 05/17/1967, 10/28/1969    Influenza 10/12/2016, 08/26/2017,  "01/19/2023    Influenza (FLUBLOK) - Quadrivalent - Recombinant - PF *Preferred* (egg allergy) 10/18/2023    Influenza - Quadrivalent - PF *Preferred* (6 months and older) 10/12/2016, 08/26/2017, 10/04/2018, 10/03/2019, 10/13/2021, 01/06/2022, 01/19/2023    Influenza - Trivalent - Afluria, Fluzone MDV 11/05/2020, 01/19/2023    Influenza - Trivalent - Fluarix, Flulaval, Fluzone, Afluria - PF 10/12/2016, 08/26/2017, 01/06/2022    MMR 02/23/1966, 11/29/1967, 02/18/1971, 08/15/1973    Pneumococcal Conjugate - 13 Valent 11/25/2019    Pneumococcal Conjugate - 20 Valent 08/16/2022    Pneumococcal Polysaccharide - 23 Valent 10/26/2018      Health Maintenance   Topic Date Due    Foot Exam  Never done    Shingles Vaccine (1 of 2) Never done    Eye Exam  05/02/2024    Hemoglobin A1c  02/02/2025    PROSTATE-SPECIFIC ANTIGEN  05/01/2025    Lipid Panel  05/22/2025    LDCT Lung Screen  08/02/2025    Colorectal Cancer Screening  09/12/2025    Low Dose Statin  10/22/2025    TETANUS VACCINE  09/20/2034    Hepatitis C Screening  Completed     Health Maintenance Topics with due status: Not Due       Topic Last Completion Date    Colorectal Cancer Screening 09/12/2022    Diabetes Urine Screening 12/14/2023    PROSTATE-SPECIFIC ANTIGEN 05/01/2024    Lipid Panel 05/22/2024    LDCT Lung Screen 08/02/2024    Hemoglobin A1c 08/02/2024    TETANUS VACCINE 09/20/2024    Low Dose Statin 10/22/2024     Health Maintenance Due   Topic Date Due    Foot Exam  Never done    HIV Screening  Never done    Shingles Vaccine (1 of 2) Never done    RSV Vaccine (Age 60+ and Pregnant patients) (1 - Risk 60-74 years 1-dose series) Never done    Eye Exam  05/02/2024    COVID-19 Vaccine (5 - 2024-25 season) 09/01/2024       PHYSICAL EXAM:  Vitals:    10/22/24 1309   BP: 117/78   Patient Position: Sitting   Pulse: 80   SpO2: 98%   Weight: 94.3 kg (208 lb)   Height: 5' 11" (1.803 m)     Body mass index is 29.01 kg/m².  Physical Exam  Vitals and nursing note " reviewed.   Constitutional:       General: He is not in acute distress.     Appearance: He is not ill-appearing or toxic-appearing.   HENT:      Head: Normocephalic and atraumatic.      Right Ear: External ear normal.      Left Ear: External ear normal.      Nose: Nose normal.      Mouth/Throat:      Mouth: Mucous membranes are dry.      Pharynx: Oropharynx is clear.   Eyes:      Extraocular Movements: Extraocular movements intact.      Conjunctiva/sclera: Conjunctivae normal.   Cardiovascular:      Rate and Rhythm: Normal rate and regular rhythm.      Pulses: Normal pulses.      Heart sounds: Normal heart sounds. No murmur heard.  Pulmonary:      Effort: Pulmonary effort is normal. No respiratory distress.      Breath sounds: Normal breath sounds. No wheezing or rales.   Abdominal:      General: Abdomen is flat. There is no distension.      Palpations: Abdomen is soft. There is no mass.      Tenderness: There is no abdominal tenderness. There is no right CVA tenderness, left CVA tenderness or guarding.   Musculoskeletal:         General: No swelling or tenderness.      Cervical back: Normal range of motion and neck supple. No rigidity.      Right lower leg: No edema.      Left lower leg: No edema.      Comments: No bony tenderness over cervical, thoracic, lumbar spine   Skin:     General: Skin is warm and dry.      Findings: No lesion or rash.   Neurological:      General: No focal deficit present.      Mental Status: He is alert and oriented to person, place, and time. Mental status is at baseline.      Cranial Nerves: No cranial nerve deficit.      Motor: No weakness.      Gait: Gait normal.   Psychiatric:         Mood and Affect: Mood normal.         Behavior: Behavior normal.          ASSESSMENT/PLAN:  1. Bilateral claudication of lower limb  Assessment & Plan:  LE US findings of significant stenosis involving the mid to distal left superficial femoral artery.  - continue plavix and aspirin  - continue daily  statin  - f/u CTA runoff study  - f/u referral vascular specialist    Orders:  -     Ambulatory referral/consult to Vascular Surgery; Future; Expected date: 10/29/2024  -     clopidogreL (PLAVIX) 75 mg tablet; Take 1 tablet (75 mg total) by mouth once daily. NOTIFIED DR. GAINES OFFICE  Dispense: 30 tablet; Refill: 11    2. PAD (peripheral artery disease)  Assessment & Plan:  8/16/23 Sonogram bilateral legs, significant stenosis involving the mid to distal left superficial femoral artery  Claudication pain always in left leg pain, but right leg started to hurt over the past several months.  Right ankle injured in 2010 hit by drill pipe with multiple fractures, s/p harware per patient.   10/22/24 Leg pain worsening affecting patient's ambulation after a few steps. Per patient awaiting follow up with vascular specialist  - taking daily aspirin and plavix, statin  - pletal on board        3. Primary hypertension  Overview:  - carvedilol 12.5 mg BID  - valsartan 160 mg daily  - chlorthalidone 12.5 mg daily  - maintain daily adequate hydration with at least 1.5 to 2 L water     Assessment & Plan:  BP Readings from Last 3 Encounters:   10/22/24 117/78   07/30/24 130/61   05/23/24 (!) 113/58   Denies symptoms. Continue with above regimen.   - follow low sodium diet <2 grams or 2 teaspoons daily  - avoid salty and processed foods from diet  - DASH diet        Orders:  -     Comprehensive Metabolic Panel; Future; Expected date: 10/22/2024    4. Type 2 diabetes mellitus without complication, with long-term current use of insulin  -     insulin detemir U-100, Levemir, (LEVEMIR FLEXPEN) 100 unit/mL (3 mL) InPn pen; Inject 20 Units into the skin once daily.  Dispense: 6 mL; Refill: 2  -     metFORMIN (GLUCOPHAGE-XR) 500 MG ER 24hr tablet; Take 2 tablets (1,000 mg total) by mouth 2 (two) times daily with meals.  Dispense: 120 tablet; Refill: 11  -     JARDIANCE 25 mg tablet; Take 1 tablet (25 mg total) by mouth once daily.   Dispense: 30 tablet; Refill: 11  -     Comprehensive Metabolic Panel; Future; Expected date: 10/22/2024    5. Mixed hyperlipidemia  Assessment & Plan:  Continue rosuvastatin 40 mg daily.         6. Hypertriglyceridemia  Assessment & Plan:  Tolerating rosuvastatin 40 mg, lovaza 2 g BID,       7. Stage 3b chronic kidney disease  Assessment & Plan:  Lab Results   Component Value Date    CREATININE 1.4 05/22/2024    BUN 18 05/22/2024     05/22/2024    K 3.7 05/22/2024     05/22/2024    CO2 24 05/22/2024   Stable renal function. Cr within normal limits.  CrCl cannot be calculated (Patient's most recent lab result is older than the maximum 7 days allowed.).  - maintain daily adequate hydration  - avoid nephrotoxic medications including use of NSAIDs  - f/u nephrology specialist      Orders:  -     Comprehensive Metabolic Panel; Future; Expected date: 10/22/2024    8. Anemia, unspecified type  -     CBC Auto Differential; Future; Expected date: 10/22/2024    9. Polyneuropathy due to medical condition  -     gabapentin (NEURONTIN) 400 MG capsule; Take 1 capsule (400 mg total) by mouth 2 (two) times daily.  Dispense: 60 capsule; Refill: 11    10. Pulmonary nodules  Assessment & Plan:  Denies excessive shortness of breath, wheezing, hemoptysis, Chronic cigar smoker.   Awaiting follow up appointment with pulmonology specialist.     Orders:  -     Ambulatory referral/consult to Pulmonology; Future; Expected date: 10/29/2024    11. Insomnia, unspecified type  Assessment & Plan:  Currently taking elavil 100 mg QHS, seroquel 100 mg QHS.         12. Compliance poor  Assessment & Plan:  Associated challenges with making appointments with specialists and transportation.   Follow up referral  outpatient      13. Cigar smoker  Assessment & Plan:  Smoking cessation encouraged.           Other than changes above, continue current medications and maintain follow up with specialists.      No follow-ups on file.    Recent Results (from the past 12 weeks)   Hemoglobin A1C    Collection Time: 08/02/24  1:06 PM   Result Value Ref Range    Hemoglobin A1C 5.8 (H) 4.0 - 5.6 %    Estimated Avg Glucose 120 68 - 131 mg/dL   Comprehensive Metabolic Panel    Collection Time: 10/22/24  2:11 PM   Result Value Ref Range    Sodium 138 136 - 145 mmol/L    Potassium 3.8 3.5 - 5.1 mmol/L    Chloride 101 95 - 110 mmol/L    CO2 32 (H) 23 - 29 mmol/L    Glucose 99 70 - 110 mg/dL    BUN 16 6 - 20 mg/dL    Creatinine 1.2 0.5 - 1.4 mg/dL    Calcium 8.9 8.7 - 10.5 mg/dL    Total Protein 7.6 6.0 - 8.4 g/dL    Albumin 3.5 3.5 - 5.2 g/dL    Total Bilirubin 0.3 0.1 - 1.0 mg/dL    Alkaline Phosphatase 108 55 - 135 U/L    AST 11 10 - 40 U/L    ALT 20 10 - 44 U/L    eGFR >60.0 >60 mL/min/1.73 m^2    Anion Gap 5 3 - 11 mmol/L   CBC Auto Differential    Collection Time: 10/22/24  2:11 PM   Result Value Ref Range    WBC 6.46 3.90 - 12.70 K/uL    RBC 4.77 4.60 - 6.20 M/uL    Hemoglobin 12.7 (L) 14.0 - 18.0 g/dL    Hematocrit 42.3 40.0 - 54.0 %    MCV 89 82 - 98 fL    MCH 26.6 (L) 27.0 - 31.0 pg    MCHC 30.0 (L) 32.0 - 36.0 g/dL    RDW 16.1 (H) 11.5 - 14.5 %    Platelets 163 150 - 450 K/uL    MPV 11.4 9.2 - 12.9 fL    Immature Granulocytes 0.3 0.0 - 0.5 %    Gran # (ANC) 4.4 1.8 - 7.7 K/uL    Immature Grans (Abs) 0.02 0.00 - 0.04 K/uL    Lymph # 1.3 1.0 - 4.8 K/uL    Mono # 0.5 0.3 - 1.0 K/uL    Eos # 0.2 0.0 - 0.5 K/uL    Baso # 0.04 0.00 - 0.20 K/uL    nRBC 0 0 /100 WBC    Gran % 68.8 38.0 - 73.0 %    Lymph % 20.4 18.0 - 48.0 %    Mono % 7.4 4.0 - 15.0 %    Eosinophil % 2.5 0.0 - 8.0 %    Basophil % 0.6 0.0 - 1.9 %    Differential Method Automated          DO [facility]

## 2024-10-22 NOTE — ASSESSMENT & PLAN NOTE
LE US findings of significant stenosis involving the mid to distal left superficial femoral artery.  - continue plavix and aspirin  - continue daily statin  - f/u CTA runoff study  - f/u referral vascular specialist

## 2024-10-23 PROBLEM — G63 POLYNEUROPATHY DUE TO MEDICAL CONDITION: Status: ACTIVE | Noted: 2024-10-23

## 2024-10-23 PROBLEM — Z79.4 TYPE 2 DIABETES MELLITUS WITHOUT COMPLICATION, WITH LONG-TERM CURRENT USE OF INSULIN: Status: ACTIVE | Noted: 2024-10-23

## 2024-10-23 PROBLEM — E11.65 UNCONTROLLED TYPE 2 DIABETES MELLITUS WITH HYPERGLYCEMIA: Status: ACTIVE | Noted: 2024-10-23

## 2024-10-23 PROBLEM — R91.8 PULMONARY NODULES: Status: ACTIVE | Noted: 2024-10-23

## 2024-10-23 PROBLEM — E11.9 TYPE 2 DIABETES MELLITUS WITHOUT COMPLICATION, WITH LONG-TERM CURRENT USE OF INSULIN: Status: ACTIVE | Noted: 2024-10-23

## 2024-10-23 NOTE — ASSESSMENT & PLAN NOTE
Associated challenges with making appointments with specialists and transportation.   Follow up referral  outpatient

## 2024-10-23 NOTE — ASSESSMENT & PLAN NOTE
Denies excessive shortness of breath, wheezing, hemoptysis, Chronic cigar smoker.   Awaiting follow up appointment with pulmonology specialist.

## 2024-11-04 DIAGNOSIS — E11.9 TYPE 2 DIABETES MELLITUS WITHOUT COMPLICATION, WITH LONG-TERM CURRENT USE OF INSULIN: ICD-10-CM

## 2024-11-04 DIAGNOSIS — Z79.4 TYPE 2 DIABETES MELLITUS WITHOUT COMPLICATION, WITH LONG-TERM CURRENT USE OF INSULIN: ICD-10-CM

## 2024-11-04 DIAGNOSIS — G56.93 BILATERAL NEUROPATHY OF UPPER EXTREMITIES: ICD-10-CM

## 2024-11-06 ENCOUNTER — TELEPHONE (OUTPATIENT)
Dept: PRIMARY CARE CLINIC | Facility: CLINIC | Age: 59
End: 2024-11-06
Payer: MEDICAID

## 2024-11-06 RX ORDER — AMITRIPTYLINE HYDROCHLORIDE 100 MG/1
100 TABLET ORAL NIGHTLY
Qty: 30 TABLET | Refills: 0 | Status: SHIPPED | OUTPATIENT
Start: 2024-11-06

## 2024-11-07 DIAGNOSIS — E11.42 TYPE 2 DIABETES MELLITUS WITH DIABETIC POLYNEUROPATHY, WITH LONG-TERM CURRENT USE OF INSULIN: ICD-10-CM

## 2024-11-07 DIAGNOSIS — Z79.4 TYPE 2 DIABETES MELLITUS WITH DIABETIC POLYNEUROPATHY, WITH LONG-TERM CURRENT USE OF INSULIN: Primary | ICD-10-CM

## 2024-11-07 DIAGNOSIS — Z79.4 TYPE 2 DIABETES MELLITUS WITH DIABETIC POLYNEUROPATHY, WITH LONG-TERM CURRENT USE OF INSULIN: ICD-10-CM

## 2024-11-07 DIAGNOSIS — E11.42 TYPE 2 DIABETES MELLITUS WITH DIABETIC POLYNEUROPATHY, WITH LONG-TERM CURRENT USE OF INSULIN: Primary | ICD-10-CM

## 2024-11-07 RX ORDER — INSULIN GLARGINE 100 [IU]/ML
20 INJECTION, SOLUTION SUBCUTANEOUS DAILY
Qty: 6 ML | Refills: 11 | Status: SHIPPED | OUTPATIENT
Start: 2024-11-07 | End: 2025-11-07

## 2024-11-07 RX ORDER — INSULIN GLARGINE 100 [IU]/ML
20 INJECTION, SOLUTION SUBCUTANEOUS NIGHTLY
Qty: 6 ML | Refills: 11 | Status: SHIPPED | OUTPATIENT
Start: 2024-11-07 | End: 2025-11-07

## 2024-11-07 RX ORDER — INSULIN GLARGINE 100 [IU]/ML
20 INJECTION, SOLUTION SUBCUTANEOUS DAILY
Qty: 6 ML | Refills: 11 | Status: SHIPPED | OUTPATIENT
Start: 2024-11-07 | End: 2024-11-07 | Stop reason: SDUPTHER

## 2024-11-18 ENCOUNTER — OFFICE VISIT (OUTPATIENT)
Dept: ENDOCRINOLOGY | Facility: CLINIC | Age: 59
End: 2024-11-18
Payer: MEDICAID

## 2024-11-18 VITALS
OXYGEN SATURATION: 95 % | DIASTOLIC BLOOD PRESSURE: 70 MMHG | HEART RATE: 72 BPM | HEIGHT: 70 IN | WEIGHT: 216.63 LBS | RESPIRATION RATE: 18 BRPM | BODY MASS INDEX: 31.01 KG/M2 | SYSTOLIC BLOOD PRESSURE: 151 MMHG

## 2024-11-18 DIAGNOSIS — E66.811 CLASS 1 OBESITY WITH SERIOUS COMORBIDITY AND BODY MASS INDEX (BMI) OF 30.0 TO 30.9 IN ADULT, UNSPECIFIED OBESITY TYPE: ICD-10-CM

## 2024-11-18 DIAGNOSIS — Z79.4 TYPE 2 DIABETES MELLITUS WITH DIABETIC POLYNEUROPATHY, WITH LONG-TERM CURRENT USE OF INSULIN: Primary | ICD-10-CM

## 2024-11-18 DIAGNOSIS — E78.2 MIXED HYPERLIPIDEMIA: ICD-10-CM

## 2024-11-18 DIAGNOSIS — E11.42 TYPE 2 DIABETES MELLITUS WITH DIABETIC POLYNEUROPATHY, WITH LONG-TERM CURRENT USE OF INSULIN: Primary | ICD-10-CM

## 2024-11-18 PROCEDURE — 99214 OFFICE O/P EST MOD 30 MIN: CPT | Mod: S$PBB,,, | Performed by: PHYSICIAN ASSISTANT

## 2024-11-18 PROCEDURE — 3078F DIAST BP <80 MM HG: CPT | Mod: CPTII,,, | Performed by: PHYSICIAN ASSISTANT

## 2024-11-18 PROCEDURE — 4010F ACE/ARB THERAPY RXD/TAKEN: CPT | Mod: CPTII,,, | Performed by: PHYSICIAN ASSISTANT

## 2024-11-18 PROCEDURE — 3044F HG A1C LEVEL LT 7.0%: CPT | Mod: CPTII,,, | Performed by: PHYSICIAN ASSISTANT

## 2024-11-18 PROCEDURE — 99214 OFFICE O/P EST MOD 30 MIN: CPT | Mod: PBBFAC | Performed by: PHYSICIAN ASSISTANT

## 2024-11-18 PROCEDURE — 95251 CONT GLUC MNTR ANALYSIS I&R: CPT | Mod: ,,, | Performed by: PHYSICIAN ASSISTANT

## 2024-11-18 PROCEDURE — 3008F BODY MASS INDEX DOCD: CPT | Mod: CPTII,,, | Performed by: PHYSICIAN ASSISTANT

## 2024-11-18 PROCEDURE — 3077F SYST BP >= 140 MM HG: CPT | Mod: CPTII,,, | Performed by: PHYSICIAN ASSISTANT

## 2024-11-18 PROCEDURE — 99999 PR PBB SHADOW E&M-EST. PATIENT-LVL IV: CPT | Mod: PBBFAC,,, | Performed by: PHYSICIAN ASSISTANT

## 2024-11-18 PROCEDURE — 3066F NEPHROPATHY DOC TX: CPT | Mod: CPTII,,, | Performed by: PHYSICIAN ASSISTANT

## 2024-11-18 PROCEDURE — 1159F MED LIST DOCD IN RCRD: CPT | Mod: CPTII,,, | Performed by: PHYSICIAN ASSISTANT

## 2024-11-18 NOTE — PROGRESS NOTES
"  Subjective:      Patient ID: Jayden Canada Jr. is a 59 y.o. male.    Chief Complaint:  Gynecomastia    History of Present Illness  This is a 59 y.o. male. with a past medical history of T2DM, HTN, HLD, PVD here for evaluation of T2DM.    Type 2 diabetes mellitus    Current diabetes medications:  - Lantus 20 U AM  - Metformin 1,000 mg BID  - Jardiance 25 mg daily    Past diabetes medications:  - None     He was off all medications for a few months and restarted 2 months ago.     Known diabetic complications: none    Weight trend:  Wt Readings from Last 5 Encounters:   03/10/22 107.7 kg (237 lb 7 oz)   12/02/21 107.5 kg (237 lb)   06/24/21 97.2 kg (214 lb 4.6 oz)   05/05/21 99.8 kg (220 lb)   01/23/21 112.5 kg (248 lb)       Blood glucose monitoring at home:         Diabetes Management Status  Statin: Not taking  ACE/ARB: Taking    Outside labs  12/21/21  HbA1c 6.4%  Hb 12.3, Hct 40  PSA 0.6  Vit D 24  UACR < 3 mg/g  TSH 2.3, FT4 1.03  , Tg 211, HDL 29, LDL 84  BUN 14, Cr 1.1     Review of Systems  As above    Social and family history reviewed  Current medications and allergies reviewed    Objective:   BP (!) 151/70 (BP Location: Right arm, Patient Position: Sitting)   Pulse 72   Resp 18   Ht 5' 10" (1.778 m)   Wt 98.2 kg (216 lb 9.6 oz)   SpO2 95%   BMI 31.08 kg/m²   Physical Exam  Alert, oriented  Nontender gynecomastia on L side only    BP Readings from Last 1 Encounters:   11/18/24 (!) 151/70      Wt Readings from Last 1 Encounters:   11/18/24 1454 98.2 kg (216 lb 9.6 oz)     Body mass index is 31.08 kg/m².    Lab Review:   Lab Results   Component Value Date    HGBA1C 5.8 (H) 08/02/2024     Lab Results   Component Value Date    CHOL 126 05/22/2024    HDL 19 (L) 05/22/2024    LDLCALC 48.0 (L) 05/22/2024    TRIG 295 (H) 05/22/2024    CHOLHDL 15.1 (L) 05/22/2024     Lab Results   Component Value Date     10/22/2024    K 3.8 10/22/2024     10/22/2024    CO2 32 (H) 10/22/2024    GLU 99 " 10/22/2024    BUN 16 10/22/2024    CREATININE 1.2 10/22/2024    CALCIUM 8.9 10/22/2024    PROT 7.6 10/22/2024    ALBUMIN 3.5 10/22/2024    BILITOT 0.3 10/22/2024    ALKPHOS 108 10/22/2024    AST 11 10/22/2024    ALT 20 10/22/2024    ANIONGAP 5 10/22/2024    ESTGFRAFRICA 54.1 (A) 04/07/2022    EGFRNONAA 46.8 (A) 04/07/2022    TSH 0.868 05/01/2024       All pertinent labs reviewed    Assessment and Plan     Type 2 diabetes mellitus with diabetic polyneuropathy, with long-term current use of insulin  Glucose uncontrolled with last A1c of 11.5%. Since then he has restarted all medication and CGM. Per GCM review %. Glucose in 70s in the early morning. Will decrease basal insulin. No hyperglycemia. Will contineu SGLT-2 inhibitor and Metformin.     Plan  - Continue Levemir 20 U daily  - Continue Metformin 1,000 mg BID  - Continue Jardiance 25 mg daily    F/u 2 months    Class 1 obesity with serious comorbidity and body mass index (BMI) of 30.0 to 30.9 in adult  Could consider starting GLP-1 RA in the near future    Mixed hyperlipidemia  Continue Statin managed by PCP    Yuko Baptiste PA-C  Endocrinology

## 2024-11-18 NOTE — ASSESSMENT & PLAN NOTE
Glucose uncontrolled with last A1c of 11.5%. Since then he has restarted all medication and CGM. Per GCM review %. Glucose in 70s in the early morning. Will decrease basal insulin. No hyperglycemia. Will contineu SGLT-2 inhibitor and Metformin.     Plan  - Continue Levemir 20 U daily  - Continue Metformin 1,000 mg BID  - Continue Jardiance 25 mg daily    F/u 2 months

## 2024-12-04 DIAGNOSIS — I10 PRIMARY HYPERTENSION: Primary | ICD-10-CM

## 2024-12-04 RX ORDER — VALSARTAN 160 MG/1
160 TABLET ORAL 2 TIMES DAILY
Qty: 60 TABLET | Refills: 11 | Status: SHIPPED | OUTPATIENT
Start: 2024-12-04

## 2024-12-05 DIAGNOSIS — G56.93 BILATERAL NEUROPATHY OF UPPER EXTREMITIES: ICD-10-CM

## 2024-12-06 RX ORDER — AMITRIPTYLINE HYDROCHLORIDE 100 MG/1
100 TABLET ORAL NIGHTLY
Qty: 30 TABLET | Refills: 0 | Status: SHIPPED | OUTPATIENT
Start: 2024-12-06

## 2024-12-06 NOTE — TELEPHONE ENCOUNTER
[facility] called and states that Jayden had an appt with pulm 12/5/24 and he did not show up.  She states he was going for pulmonology nodules.

## 2024-12-23 DIAGNOSIS — E11.9 TYPE 2 DIABETES MELLITUS WITHOUT COMPLICATION, WITH LONG-TERM CURRENT USE OF INSULIN: ICD-10-CM

## 2024-12-23 DIAGNOSIS — Z79.4 TYPE 2 DIABETES MELLITUS WITHOUT COMPLICATION, WITH LONG-TERM CURRENT USE OF INSULIN: ICD-10-CM

## 2024-12-24 RX ORDER — EMPAGLIFLOZIN 25 MG/1
25 TABLET, FILM COATED ORAL
Qty: 30 TABLET | Refills: 0 | Status: SHIPPED | OUTPATIENT
Start: 2024-12-24

## 2025-02-06 ENCOUNTER — OFFICE VISIT (OUTPATIENT)
Dept: PRIMARY CARE CLINIC | Facility: CLINIC | Age: 60
End: 2025-02-06
Payer: MEDICAID

## 2025-02-06 VITALS
DIASTOLIC BLOOD PRESSURE: 75 MMHG | BODY MASS INDEX: 31.92 KG/M2 | HEIGHT: 70 IN | SYSTOLIC BLOOD PRESSURE: 163 MMHG | OXYGEN SATURATION: 96 % | HEART RATE: 65 BPM | WEIGHT: 223 LBS

## 2025-02-06 DIAGNOSIS — Z79.4 TYPE 2 DIABETES MELLITUS WITHOUT COMPLICATION, WITH LONG-TERM CURRENT USE OF INSULIN: ICD-10-CM

## 2025-02-06 DIAGNOSIS — Z76.89 ENCOUNTER TO ESTABLISH CARE: Primary | ICD-10-CM

## 2025-02-06 DIAGNOSIS — Z13.21 ENCOUNTER FOR VITAMIN DEFICIENCY SCREENING: ICD-10-CM

## 2025-02-06 DIAGNOSIS — Z13.29 THYROID DISORDER SCREENING: ICD-10-CM

## 2025-02-06 DIAGNOSIS — E11.9 TYPE 2 DIABETES MELLITUS WITHOUT COMPLICATION, WITH LONG-TERM CURRENT USE OF INSULIN: ICD-10-CM

## 2025-02-06 DIAGNOSIS — Z13.0 SCREENING FOR IRON DEFICIENCY ANEMIA: ICD-10-CM

## 2025-02-06 DIAGNOSIS — Z79.4 TYPE 2 DIABETES MELLITUS WITH DIABETIC POLYNEUROPATHY, WITH LONG-TERM CURRENT USE OF INSULIN: ICD-10-CM

## 2025-02-06 DIAGNOSIS — E55.9 VITAMIN D DEFICIENCY: ICD-10-CM

## 2025-02-06 DIAGNOSIS — E78.1 HYPERTRIGLYCERIDEMIA: ICD-10-CM

## 2025-02-06 DIAGNOSIS — G56.93 BILATERAL NEUROPATHY OF UPPER EXTREMITIES: ICD-10-CM

## 2025-02-06 DIAGNOSIS — Z11.4 ENCOUNTER FOR SCREENING FOR HIV: ICD-10-CM

## 2025-02-06 DIAGNOSIS — E78.2 MIXED HYPERLIPIDEMIA: ICD-10-CM

## 2025-02-06 DIAGNOSIS — E11.42 TYPE 2 DIABETES MELLITUS WITH DIABETIC POLYNEUROPATHY, WITH LONG-TERM CURRENT USE OF INSULIN: ICD-10-CM

## 2025-02-06 LAB
25(OH)D3+25(OH)D2 SERPL-MCNC: 7 NG/ML (ref 30–96)
ALBUMIN SERPL BCP-MCNC: 3.7 G/DL (ref 3.5–5.2)
ALBUMIN/CREAT UR: 5.8 UG/MG (ref 0–30)
ALP SERPL-CCNC: 99 U/L (ref 55–135)
ALT SERPL W/O P-5'-P-CCNC: 13 U/L (ref 10–44)
ANION GAP SERPL CALC-SCNC: 10 MMOL/L (ref 8–16)
AST SERPL-CCNC: 21 U/L (ref 10–40)
BASOPHILS # BLD AUTO: 0.03 K/UL (ref 0–0.2)
BASOPHILS NFR BLD: 0.4 % (ref 0–1.9)
BILIRUB SERPL-MCNC: 0.7 MG/DL (ref 0.1–1)
BUN SERPL-MCNC: 6 MG/DL (ref 8–23)
CALCIUM SERPL-MCNC: 8.8 MG/DL (ref 8.7–10.5)
CHLORIDE SERPL-SCNC: 107 MMOL/L (ref 95–110)
CHOLEST SERPL-MCNC: 148 MG/DL (ref 120–199)
CHOLEST/HDLC SERPL: 4 {RATIO} (ref 2–5)
CO2 SERPL-SCNC: 26 MMOL/L (ref 23–29)
CREAT SERPL-MCNC: 0.8 MG/DL (ref 0.5–1.4)
CREAT UR-MCNC: 137.5 MG/DL (ref 23–375)
DIFFERENTIAL METHOD BLD: ABNORMAL
EOSINOPHIL # BLD AUTO: 0.2 K/UL (ref 0–0.5)
EOSINOPHIL NFR BLD: 2.5 % (ref 0–8)
ERYTHROCYTE [DISTWIDTH] IN BLOOD BY AUTOMATED COUNT: 14.6 % (ref 11.5–14.5)
EST. GFR  (NO RACE VARIABLE): >60 ML/MIN/1.73 M^2
ESTIMATED AVG GLUCOSE: 128 MG/DL (ref 68–131)
FERRITIN SERPL-MCNC: 125 NG/ML (ref 20–300)
GLUCOSE SERPL-MCNC: 127 MG/DL (ref 70–110)
HBA1C MFR BLD: 6.1 % (ref 4–5.6)
HCT VFR BLD AUTO: 40.9 % (ref 40–54)
HDLC SERPL-MCNC: 37 MG/DL (ref 40–75)
HDLC SERPL: 25 % (ref 20–50)
HGB BLD-MCNC: 12.4 G/DL (ref 14–18)
HIV 1+2 AB+HIV1 P24 AG SERPL QL IA: NORMAL
IMM GRANULOCYTES # BLD AUTO: 0.04 K/UL (ref 0–0.04)
IMM GRANULOCYTES NFR BLD AUTO: 0.6 % (ref 0–0.5)
IRON SERPL-MCNC: 58 UG/DL (ref 45–160)
LDLC SERPL CALC-MCNC: 78.2 MG/DL (ref 63–159)
LYMPHOCYTES # BLD AUTO: 1 K/UL (ref 1–4.8)
LYMPHOCYTES NFR BLD: 14.6 % (ref 18–48)
MCH RBC QN AUTO: 27.3 PG (ref 27–31)
MCHC RBC AUTO-ENTMCNC: 30.3 G/DL (ref 32–36)
MCV RBC AUTO: 90 FL (ref 82–98)
MICROALBUMIN UR DL<=1MG/L-MCNC: 8 UG/ML
MONOCYTES # BLD AUTO: 0.7 K/UL (ref 0.3–1)
MONOCYTES NFR BLD: 10.2 % (ref 4–15)
NEUTROPHILS # BLD AUTO: 5.1 K/UL (ref 1.8–7.7)
NEUTROPHILS NFR BLD: 71.7 % (ref 38–73)
NONHDLC SERPL-MCNC: 111 MG/DL
NRBC BLD-RTO: 0 /100 WBC
PLATELET # BLD AUTO: 245 K/UL (ref 150–450)
PMV BLD AUTO: 11.7 FL (ref 9.2–12.9)
POTASSIUM SERPL-SCNC: 3.6 MMOL/L (ref 3.5–5.1)
PROT SERPL-MCNC: 7.2 G/DL (ref 6–8.4)
RBC # BLD AUTO: 4.55 M/UL (ref 4.6–6.2)
SATURATED IRON: 17 % (ref 20–50)
SODIUM SERPL-SCNC: 143 MMOL/L (ref 136–145)
TOTAL IRON BINDING CAPACITY: 345 UG/DL (ref 250–450)
TRANSFERRIN SERPL-MCNC: 233 MG/DL (ref 200–375)
TRIGL SERPL-MCNC: 164 MG/DL (ref 30–150)
TSH SERPL DL<=0.005 MIU/L-ACNC: 1.09 UIU/ML (ref 0.4–4)
VIT B12 SERPL-MCNC: 306 PG/ML (ref 210–950)
WBC # BLD AUTO: 7.06 K/UL (ref 3.9–12.7)

## 2025-02-06 PROCEDURE — 82043 UR ALBUMIN QUANTITATIVE: CPT | Performed by: NURSE PRACTITIONER

## 2025-02-06 PROCEDURE — 80053 COMPREHEN METABOLIC PANEL: CPT | Performed by: NURSE PRACTITIONER

## 2025-02-06 PROCEDURE — 84443 ASSAY THYROID STIM HORMONE: CPT | Performed by: NURSE PRACTITIONER

## 2025-02-06 PROCEDURE — 1160F RVW MEDS BY RX/DR IN RCRD: CPT | Mod: CPTII,,, | Performed by: NURSE PRACTITIONER

## 2025-02-06 PROCEDURE — 36415 COLL VENOUS BLD VENIPUNCTURE: CPT | Mod: PBBFAC

## 2025-02-06 PROCEDURE — 82607 VITAMIN B-12: CPT | Performed by: NURSE PRACTITIONER

## 2025-02-06 PROCEDURE — 85025 COMPLETE CBC W/AUTO DIFF WBC: CPT | Performed by: NURSE PRACTITIONER

## 2025-02-06 PROCEDURE — 84466 ASSAY OF TRANSFERRIN: CPT | Performed by: NURSE PRACTITIONER

## 2025-02-06 PROCEDURE — 99215 OFFICE O/P EST HI 40 MIN: CPT | Mod: S$PBB,,, | Performed by: NURSE PRACTITIONER

## 2025-02-06 PROCEDURE — 3077F SYST BP >= 140 MM HG: CPT | Mod: CPTII,,, | Performed by: NURSE PRACTITIONER

## 2025-02-06 PROCEDURE — 82728 ASSAY OF FERRITIN: CPT | Performed by: NURSE PRACTITIONER

## 2025-02-06 PROCEDURE — 3008F BODY MASS INDEX DOCD: CPT | Mod: CPTII,,, | Performed by: NURSE PRACTITIONER

## 2025-02-06 PROCEDURE — 99999 PR PBB SHADOW E&M-EST. PATIENT-LVL III: CPT | Mod: PBBFAC,,, | Performed by: NURSE PRACTITIONER

## 2025-02-06 PROCEDURE — 99213 OFFICE O/P EST LOW 20 MIN: CPT | Mod: PBBFAC | Performed by: NURSE PRACTITIONER

## 2025-02-06 PROCEDURE — 82306 VITAMIN D 25 HYDROXY: CPT | Performed by: NURSE PRACTITIONER

## 2025-02-06 PROCEDURE — 3078F DIAST BP <80 MM HG: CPT | Mod: CPTII,,, | Performed by: NURSE PRACTITIONER

## 2025-02-06 PROCEDURE — 80061 LIPID PANEL: CPT | Performed by: NURSE PRACTITIONER

## 2025-02-06 PROCEDURE — 36415 COLL VENOUS BLD VENIPUNCTURE: CPT | Performed by: NURSE PRACTITIONER

## 2025-02-06 PROCEDURE — 87389 HIV-1 AG W/HIV-1&-2 AB AG IA: CPT | Performed by: NURSE PRACTITIONER

## 2025-02-06 PROCEDURE — 99999PBSHW PR PBB SHADOW TECHNICAL ONLY FILED TO HB: Mod: PBBFAC,,,

## 2025-02-06 PROCEDURE — 1159F MED LIST DOCD IN RCRD: CPT | Mod: CPTII,,, | Performed by: NURSE PRACTITIONER

## 2025-02-06 PROCEDURE — 83036 HEMOGLOBIN GLYCOSYLATED A1C: CPT | Performed by: NURSE PRACTITIONER

## 2025-02-06 RX ORDER — AMITRIPTYLINE HYDROCHLORIDE 100 MG/1
100 TABLET ORAL NIGHTLY
Qty: 30 TABLET | Refills: 11 | Status: SHIPPED | OUTPATIENT
Start: 2025-02-06

## 2025-02-06 RX ORDER — METFORMIN HYDROCHLORIDE 500 MG/1
500 TABLET, EXTENDED RELEASE ORAL 2 TIMES DAILY WITH MEALS
Qty: 120 TABLET | Refills: 11 | Status: SHIPPED | OUTPATIENT
Start: 2025-02-06 | End: 2025-02-06

## 2025-02-06 NOTE — PROGRESS NOTES
[facility] Note    HPI:  Jayden Canada Jr. is a 60 y.o. male who presents today for Insomnia (Patient states he has been out of his sleep meds. )         Review of Systems   Constitutional: Negative.    HENT: Negative.     Eyes: Negative.    Respiratory: Negative.     Cardiovascular: Negative.    Gastrointestinal: Negative.    Genitourinary: Negative.    Musculoskeletal:  Positive for myalgias (bilateral legs).   Skin: Negative.    Neurological:  Positive for tingling (1st 3 fingers right hand numbness).   Endo/Heme/Allergies: Negative.    Psychiatric/Behavioral: Negative.        A review of systems was performed and was negative except as noted above.    I personally reviewed allergies, past medical, surgical, social and family history and updated as appropriate.    Medications:    Current Outpatient Medications:     albuterol (PROVENTIL/VENTOLIN HFA) 90 mcg/actuation inhaler, Inhale 1-2 puffs into the lungs every 6 (six) hours as needed for Wheezing or Shortness of Breath. Rescue, Disp: 18 g, Rfl: 5    amitriptyline (ELAVIL) 100 MG tablet, Take 1 tablet (100 mg total) by mouth every evening., Disp: 30 tablet, Rfl: 11    aspirin (ECOTRIN) 81 MG EC tablet, Take 81 mg by mouth once daily. NOTIFIED DR. GAINES OFFICE, Disp: , Rfl:     carvediloL (COREG) 12.5 MG tablet, Take 12.5 mg by mouth 2 (two) times daily., Disp: , Rfl:     clopidogreL (PLAVIX) 75 mg tablet, Take 1 tablet (75 mg total) by mouth once daily. NOTIFIED DR. GAINES OFFICE, Disp: 30 tablet, Rfl: 11    DEXCOM G7  Misc, Use to check glucose with sensors, Disp: 1 each, Rfl: 0    DEXCOM G7 SENSOR Chery, 1 Device by Misc.(Non-Drug; Combo Route) route every 10 days., Disp: 3 each, Rfl: 11    dorzolamide-timolol 2-0.5% (COSOPT) 22.3-6.8 mg/mL ophthalmic solution, SMARTSI Drop(s) In Eye(s) Every 12 Hours, Disp: , Rfl:     FEROSUL 325 mg (65 mg iron) Tab tablet, Take 1 tablet (325 mg total) by mouth once daily., Disp: 30  tablet, Rfl: 5    gabapentin (NEURONTIN) 400 MG capsule, Take 1 capsule (400 mg total) by mouth 2 (two) times daily., Disp: 60 capsule, Rfl: 11    insulin glargine U-100, Lantus, (LANTUS SOLOSTAR U-100 INSULIN) 100 unit/mL (3 mL) InPn pen, Inject 20 Units into the skin once daily., Disp: 6 mL, Rfl: 11    insulin glargine U-100, Lantus, 100 unit/mL injection, Inject 20 Units into the skin every evening., Disp: 6 mL, Rfl: 11    JARDIANCE 25 mg tablet, Take 1 tablet by mouth once daily, Disp: 30 tablet, Rfl: 0    lancets Misc, 1 lancet  by Misc.(Non-Drug; Combo Route) route 4 (four) times daily., Disp: 200 each, Rfl: 4    latanoprost 0.005 % ophthalmic solution, , Disp: , Rfl:     metFORMIN (GLUCOPHAGE-XR) 500 MG ER 24hr tablet, Take 1 tablet (500 mg total) by mouth 2 (two) times daily with meals., Disp: 120 tablet, Rfl: 11    omega-3 acid ethyl esters (LOVAZA) 1 gram capsule, Take 2 capsules (2 g total) by mouth 2 (two) times daily., Disp: 360 capsule, Rfl: 3    QUEtiapine (SEROQUEL) 100 MG Tab, Take 1 tablet (100 mg total) by mouth every evening., Disp: 30 tablet, Rfl: 5    rosuvastatin (CRESTOR) 40 MG Tab, Take 1 tablet (40 mg total) by mouth every evening., Disp: 90 tablet, Rfl: 3    STIOLTO RESPIMAT 2.5-2.5 mcg/actuation Mist, Inhale 2 puffs into the lungs once daily. Controller, Disp: 4 g, Rfl: 11    valsartan (DIOVAN) 160 MG tablet, Take 1 tablet (160 mg total) by mouth 2 (two) times daily., Disp: 60 tablet, Rfl: 11     Health Maintenance:  Immunization History   Administered Date(s) Administered    COVID-19 Vaccine 03/01/2021, 04/06/2021    COVID-19, MRNA, LN-S, PF (MODERNA FULL 0.5 ML DOSE) 03/01/2021, 04/06/2021    DTP 02/23/1966, 03/30/1966, 05/04/1966, 11/27/1968, 10/28/1969, 12/05/2010    Hepatitis B, Pediatric/Adolescent 02/10/2012    IPV 05/04/1966, 04/12/1967, 05/17/1967, 10/28/1969    Influenza 10/12/2016, 08/26/2017, 01/19/2023, 09/13/2024    Influenza (FLUBLOK) - Quadrivalent - Recombinant - PF  *Preferred* (egg allergy) 10/18/2023    Influenza - Quadrivalent - PF *Preferred* (6 months and older) 10/12/2016, 08/26/2017, 10/04/2018, 10/03/2019, 10/13/2021, 01/06/2022, 01/19/2023    Influenza - Trivalent - Afluria, Fluzone MDV 11/05/2020, 01/06/2022, 01/19/2023    Influenza - Trivalent - Fluarix, Flulaval, Fluzone, Afluria - PF 10/12/2016, 08/26/2017, 01/06/2022, 09/20/2024    MMR 02/23/1966, 11/29/1967, 02/18/1971, 08/15/1973    Pneumococcal Conjugate - 13 Valent 11/25/2019    Pneumococcal Conjugate - 20 Valent 08/16/2022    Pneumococcal Polysaccharide - 23 Valent 10/26/2018    Tdap 09/20/2024      Health Maintenance   Topic Date Due    Foot Exam  Never done    HIV Screening  Never done    RSV Vaccine (Age 60+ and Pregnant patients) (1 - Risk 60-74 years 1-dose series) Never done    Diabetic Eye Exam  05/02/2024    Diabetes Urine Screening  12/14/2024    Hemoglobin A1c  02/02/2025    Shingles Vaccine (1 of 2) 02/06/2026 (Originally 1/21/2014)    COVID-19 Vaccine (5 - 2024-25 season) 02/06/2026 (Originally 9/1/2024)    PROSTATE-SPECIFIC ANTIGEN  05/01/2025    Lipid Panel  05/22/2025    LDCT Lung Screen  08/02/2025    Colorectal Cancer Screening  09/12/2025    Low Dose Statin  10/22/2025    TETANUS VACCINE  09/20/2034    Hepatitis C Screening  Completed    Influenza Vaccine  Completed    Pneumococcal Vaccines (Age 50+)  Completed     Health Maintenance Topics with due status: Not Due       Topic Last Completion Date    Colorectal Cancer Screening 09/12/2022    PROSTATE-SPECIFIC ANTIGEN 05/01/2024    Lipid Panel 05/22/2024    LDCT Lung Screen 08/02/2024    TETANUS VACCINE 09/20/2024    Low Dose Statin 10/22/2024     Health Maintenance Due   Topic Date Due    Foot Exam  Never done    HIV Screening  Never done    RSV Vaccine (Age 60+ and Pregnant patients) (1 - Risk 60-74 years 1-dose series) Never done    Diabetic Eye Exam  05/02/2024    Diabetes Urine Screening  12/14/2024    Hemoglobin A1c  02/02/2025  "      PHYSICAL EXAM:  Vitals:    02/06/25 1330 02/06/25 1334   BP: (!) 169/81 (!) 163/75   Patient Position: Sitting Sitting   Pulse: 65    SpO2: 96%    Weight: 101.2 kg (223 lb)    Height: 5' 10" (1.778 m)      Body mass index is 32 kg/m².  Physical Exam  Vitals and nursing note reviewed.   Constitutional:       Appearance: Normal appearance. He is normal weight.   HENT:      Head: Normocephalic.      Right Ear: Tympanic membrane, ear canal and external ear normal.      Left Ear: Tympanic membrane, ear canal and external ear normal.      Nose: Nose normal.      Mouth/Throat:      Mouth: Mucous membranes are moist.   Cardiovascular:      Rate and Rhythm: Normal rate and regular rhythm.      Pulses: Normal pulses.      Heart sounds: Normal heart sounds.   Pulmonary:      Effort: Pulmonary effort is normal.      Breath sounds: Normal breath sounds.   Musculoskeletal:         General: Normal range of motion.      Cervical back: Normal range of motion.   Skin:     General: Skin is warm and dry.   Neurological:      General: No focal deficit present.      Mental Status: He is alert and oriented to person, place, and time.          ASSESSMENT/PLAN:  1. Encounter to establish care    2. Bilateral neuropathy of upper extremities  -     amitriptyline (ELAVIL) 100 MG tablet; Take 1 tablet (100 mg total) by mouth every evening.  Dispense: 30 tablet; Refill: 11    3. Type 2 diabetes mellitus without complication, with long-term current use of insulin  -     metFORMIN (GLUCOPHAGE-XR) 500 MG ER 24hr tablet; Take 1 tablet (500 mg total) by mouth 2 (two) times daily with meals.  Dispense: 120 tablet; Refill: 11    4. Hypertriglyceridemia  -     Lipid Panel; Future; Expected date: 02/06/2025    5. Mixed hyperlipidemia  -     Lipid Panel; Future; Expected date: 02/06/2025    6. Type 2 diabetes mellitus with diabetic polyneuropathy, with long-term current use of insulin  -     Comprehensive Metabolic Panel; Future; Expected date: " 02/06/2025  -     Hemoglobin A1C; Future; Expected date: 02/06/2025  -     Microalbumin/Creatinine Ratio, Urine; Future; Expected date: 02/06/2025    7. Encounter for screening for HIV  -     HIV 1/2 Ag/Ab (4th Gen); Future; Expected date: 02/06/2025    8. Vitamin D deficiency  -     Misc Sendout Test, Blood Vitamin D; Future; Expected date: 02/06/2025    9. Thyroid disorder screening  -     TSH; Future; Expected date: 02/06/2025    10. Screening for iron deficiency anemia  -     CBC Auto Differential; Future; Expected date: 02/06/2025  -     Ferritin; Future; Expected date: 02/06/2025  -     Iron and TIBC; Future; Expected date: 02/06/2025    11. Encounter for vitamin deficiency screening  -     Vitamin B12; Future; Expected date: 02/06/2025        Other than changes above, continue current medications and maintain follow up with specialists.      No follow-ups on file.   No results found for this or any previous visit (from the past 12 weeks).      RAYMON Cameron  [facility]

## 2025-02-07 DIAGNOSIS — E55.9 VITAMIN D DEFICIENCY: Primary | ICD-10-CM

## 2025-02-07 RX ORDER — ERGOCALCIFEROL 1.25 MG/1
50000 CAPSULE ORAL
Qty: 12 CAPSULE | Refills: 1 | Status: SHIPPED | OUTPATIENT
Start: 2025-02-07 | End: 2025-04-08

## 2025-03-24 DIAGNOSIS — G47.00 INSOMNIA, UNSPECIFIED TYPE: ICD-10-CM

## 2025-03-24 RX ORDER — QUETIAPINE FUMARATE 100 MG/1
100 TABLET, FILM COATED ORAL NIGHTLY
Qty: 30 TABLET | Refills: 5 | Status: SHIPPED | OUTPATIENT
Start: 2025-03-24

## 2025-03-31 ENCOUNTER — OFFICE VISIT (OUTPATIENT)
Dept: PRIMARY CARE CLINIC | Facility: CLINIC | Age: 60
End: 2025-03-31
Payer: MEDICAID

## 2025-03-31 ENCOUNTER — PATIENT MESSAGE (OUTPATIENT)
Dept: ADMINISTRATIVE | Facility: HOSPITAL | Age: 60
End: 2025-03-31
Payer: MEDICAID

## 2025-03-31 VITALS
TEMPERATURE: 99 F | WEIGHT: 218.5 LBS | OXYGEN SATURATION: 97 % | BODY MASS INDEX: 31.28 KG/M2 | HEIGHT: 70 IN | HEART RATE: 100 BPM | SYSTOLIC BLOOD PRESSURE: 176 MMHG | DIASTOLIC BLOOD PRESSURE: 82 MMHG

## 2025-03-31 DIAGNOSIS — E55.9 VITAMIN D DEFICIENCY: ICD-10-CM

## 2025-03-31 DIAGNOSIS — D50.9 IRON DEFICIENCY ANEMIA, UNSPECIFIED IRON DEFICIENCY ANEMIA TYPE: ICD-10-CM

## 2025-03-31 DIAGNOSIS — D53.9 NUTRITIONAL ANEMIA: ICD-10-CM

## 2025-03-31 DIAGNOSIS — K21.9 GASTROESOPHAGEAL REFLUX DISEASE, UNSPECIFIED WHETHER ESOPHAGITIS PRESENT: Primary | ICD-10-CM

## 2025-03-31 DIAGNOSIS — I10 PRIMARY HYPERTENSION: ICD-10-CM

## 2025-03-31 LAB
ABSOLUTE EOSINOPHIL (OHS): 0.17 K/UL
ABSOLUTE MONOCYTE (OHS): 0.45 K/UL (ref 0.3–1)
ABSOLUTE NEUTROPHIL COUNT (OHS): 3.67 K/UL (ref 1.8–7.7)
BASOPHILS # BLD AUTO: 0.03 K/UL
BASOPHILS NFR BLD AUTO: 0.5 %
ERYTHROCYTE [DISTWIDTH] IN BLOOD BY AUTOMATED COUNT: 13.9 % (ref 11.5–14.5)
HCT VFR BLD AUTO: 44.3 % (ref 40–54)
HGB BLD-MCNC: 13.7 GM/DL (ref 14–18)
IMM GRANULOCYTES # BLD AUTO: 0.01 K/UL (ref 0–0.04)
IMM GRANULOCYTES NFR BLD AUTO: 0.2 % (ref 0–0.5)
IRON SATN MFR SERPL: 21 % (ref 20–50)
IRON SERPL-MCNC: 80 UG/DL (ref 45–160)
LYMPHOCYTES # BLD AUTO: 1.24 K/UL (ref 1–4.8)
MCH RBC QN AUTO: 28 PG (ref 27–31)
MCHC RBC AUTO-ENTMCNC: 30.9 G/DL (ref 32–36)
MCV RBC AUTO: 90 FL (ref 82–98)
NUCLEATED RBC (/100WBC) (OHS): 0 /100 WBC
PLATELET # BLD AUTO: 255 K/UL (ref 150–450)
PMV BLD AUTO: 11.2 FL (ref 9.2–12.9)
RBC # BLD AUTO: 4.9 M/UL (ref 4.6–6.2)
RELATIVE EOSINOPHIL (OHS): 3.1 %
RELATIVE LYMPHOCYTE (OHS): 22.3 % (ref 18–48)
RELATIVE MONOCYTE (OHS): 8.1 % (ref 4–15)
RELATIVE NEUTROPHIL (OHS): 65.8 % (ref 38–73)
TIBC SERPL-MCNC: 389 UG/DL (ref 250–450)
TRANSFERRIN SERPL-MCNC: 263 MG/DL (ref 200–375)
WBC # BLD AUTO: 5.57 K/UL (ref 3.9–12.7)

## 2025-03-31 PROCEDURE — 3077F SYST BP >= 140 MM HG: CPT | Mod: CPTII,,, | Performed by: NURSE PRACTITIONER

## 2025-03-31 PROCEDURE — 99214 OFFICE O/P EST MOD 30 MIN: CPT | Mod: S$PBB,,, | Performed by: NURSE PRACTITIONER

## 2025-03-31 PROCEDURE — 3079F DIAST BP 80-89 MM HG: CPT | Mod: CPTII,,, | Performed by: NURSE PRACTITIONER

## 2025-03-31 PROCEDURE — 85025 COMPLETE CBC W/AUTO DIFF WBC: CPT | Performed by: NURSE PRACTITIONER

## 2025-03-31 PROCEDURE — 99213 OFFICE O/P EST LOW 20 MIN: CPT | Mod: PBBFAC | Performed by: NURSE PRACTITIONER

## 2025-03-31 PROCEDURE — 3066F NEPHROPATHY DOC TX: CPT | Mod: CPTII,,, | Performed by: NURSE PRACTITIONER

## 2025-03-31 PROCEDURE — 99999 PR PBB SHADOW E&M-EST. PATIENT-LVL III: CPT | Mod: PBBFAC,,, | Performed by: NURSE PRACTITIONER

## 2025-03-31 PROCEDURE — 82306 VITAMIN D 25 HYDROXY: CPT | Performed by: NURSE PRACTITIONER

## 2025-03-31 PROCEDURE — 3044F HG A1C LEVEL LT 7.0%: CPT | Mod: CPTII,,, | Performed by: NURSE PRACTITIONER

## 2025-03-31 PROCEDURE — 3061F NEG MICROALBUMINURIA REV: CPT | Mod: CPTII,,, | Performed by: NURSE PRACTITIONER

## 2025-03-31 PROCEDURE — 3008F BODY MASS INDEX DOCD: CPT | Mod: CPTII,,, | Performed by: NURSE PRACTITIONER

## 2025-03-31 PROCEDURE — 84466 ASSAY OF TRANSFERRIN: CPT | Performed by: NURSE PRACTITIONER

## 2025-03-31 PROCEDURE — 36415 COLL VENOUS BLD VENIPUNCTURE: CPT | Performed by: NURSE PRACTITIONER

## 2025-03-31 RX ORDER — PANTOPRAZOLE SODIUM 40 MG/1
40 TABLET, DELAYED RELEASE ORAL DAILY
Qty: 90 TABLET | Refills: 3 | Status: SHIPPED | OUTPATIENT
Start: 2025-03-31 | End: 2026-03-31

## 2025-03-31 RX ORDER — SPIRONOLACTONE 25 MG/1
25 TABLET ORAL DAILY
Qty: 30 TABLET | Refills: 11 | Status: SHIPPED | OUTPATIENT
Start: 2025-03-31 | End: 2026-03-31

## 2025-03-31 RX ORDER — ACETAMINOPHEN 500 MG
1 TABLET ORAL 2 TIMES DAILY
Qty: 1 EACH | Refills: 0 | Status: SHIPPED | OUTPATIENT
Start: 2025-03-31

## 2025-03-31 RX ORDER — FERROUS SULFATE 325(65) MG
325 TABLET ORAL DAILY
Qty: 30 TABLET | Refills: 5 | Status: SHIPPED | OUTPATIENT
Start: 2025-03-31

## 2025-03-31 NOTE — PROGRESS NOTES
[facility] Note    HPI:  Jayden Canada Jr. is a 60 y.o. male who presents today for Heartburn and Leg Pain (Pt here for heartburn and pain in maribell legs)     B/P has been running high    Also has been on omeprazole in past, not on anything for heartburn now and it's been acting up recently, no alcohol in 20 years,     Review of Systems   Constitutional: Negative.    HENT: Negative.     Eyes: Negative.    Respiratory: Negative.     Cardiovascular: Negative.    Gastrointestinal:  Positive for heartburn.   Genitourinary: Negative.    Musculoskeletal:  Positive for joint pain.   Skin: Negative.    Neurological: Negative.    Endo/Heme/Allergies: Negative.    Psychiatric/Behavioral: Negative.        A review of systems was performed and was negative except as noted above.    I personally reviewed allergies, past medical, surgical, social and family history and updated as appropriate.    Medications:  Current Medications[1]     Health Maintenance:  Immunization History   Administered Date(s) Administered    COVID-19 Vaccine 03/01/2021, 04/06/2021    COVID-19, MRNA, LN-S, PF (MODERNA FULL 0.5 ML DOSE) 03/01/2021, 04/06/2021    DTP 02/23/1966, 03/30/1966, 05/04/1966, 11/27/1968, 10/28/1969, 12/05/2010    Hepatitis B, Pediatric/Adolescent 02/10/2012    IPV 05/04/1966, 04/12/1967, 05/17/1967, 10/28/1969    Influenza 10/12/2016, 08/26/2017, 01/19/2023, 09/13/2024    Influenza (FLUBLOK) - Quadrivalent - Recombinant - PF *Preferred* (egg allergy) 10/18/2023    Influenza - Quadrivalent - PF *Preferred* (6 months and older) 10/12/2016, 08/26/2017, 10/04/2018, 10/03/2019, 10/13/2021, 01/06/2022, 01/19/2023    Influenza - Trivalent - Afluria, Fluzone MDV 11/05/2020, 01/06/2022, 01/19/2023    Influenza - Trivalent - Fluarix, Flulaval, Fluzone, Afluria - PF 10/12/2016, 08/26/2017, 01/06/2022, 09/20/2024    MMR 02/23/1966, 11/29/1967, 02/18/1971, 08/15/1973    Pneumococcal Conjugate - 13 Valent 11/25/2019     "Pneumococcal Conjugate - 20 Valent 08/16/2022    Pneumococcal Polysaccharide - 23 Valent 10/26/2018    Tdap 09/20/2024      Health Maintenance   Topic Date Due    Foot Exam  Never done    RSV Vaccine (Age 60+ and Pregnant patients) (1 - Risk 60-74 years 1-dose series) Never done    Diabetic Eye Exam  05/02/2024    Shingles Vaccine (1 of 2) 02/06/2026 (Originally 1/21/2014)    COVID-19 Vaccine (5 - 2024-25 season) 02/06/2026 (Originally 9/1/2024)    PROSTATE-SPECIFIC ANTIGEN  05/01/2025    LDCT Lung Screen  08/02/2025    Hemoglobin A1c  08/06/2025    Colorectal Cancer Screening  09/12/2025    Low Dose Statin  10/22/2025    Diabetes Urine Screening  02/06/2026    Lipid Panel  02/06/2026    TETANUS VACCINE  09/20/2034    Hepatitis C Screening  Completed    Influenza Vaccine  Completed    HIV Screening  Completed    Pneumococcal Vaccines (Age 50+)  Completed     Health Maintenance Topics with due status: Not Due       Topic Last Completion Date    Colorectal Cancer Screening 09/12/2022    PROSTATE-SPECIFIC ANTIGEN 05/01/2024    LDCT Lung Screen 08/02/2024    TETANUS VACCINE 09/20/2024    Low Dose Statin 10/22/2024    Diabetes Urine Screening 02/06/2025    Lipid Panel 02/06/2025    Hemoglobin A1c 02/06/2025     Health Maintenance Due   Topic Date Due    Foot Exam  Never done    RSV Vaccine (Age 60+ and Pregnant patients) (1 - Risk 60-74 years 1-dose series) Never done    Diabetic Eye Exam  05/02/2024       PHYSICAL EXAM:  Vitals:    03/31/25 1456 03/31/25 1457   BP: (!) 176/93 (!) 176/82   Pulse: 100    Temp: 98.6 °F (37 °C)    TempSrc: Oral    SpO2: 97%    Weight: 99.1 kg (218 lb 8 oz)    Height: 5' 10" (1.778 m)      Body mass index is 31.35 kg/m².  Physical Exam     ASSESSMENT/PLAN:  1. Gastroesophageal reflux disease, unspecified whether esophagitis present  -     pantoprazole (PROTONIX) 40 MG tablet; Take 1 tablet (40 mg total) by mouth once daily.  Dispense: 90 tablet; Refill: 3    2. Vitamin D deficiency  -     " Vitamin D; Future; Expected date: 03/31/2025    3. Nutritional anemia  -     FEROSUL 325 mg (65 mg iron) Tab tablet; Take 1 tablet (325 mg total) by mouth once daily.  Dispense: 30 tablet; Refill: 5  -     CBC Auto Differential; Future; Expected date: 05/31/2025  -     Iron and TIBC; Future; Expected date: 05/31/2025    4. Iron deficiency anemia, unspecified iron deficiency anemia type  -     FEROSUL 325 mg (65 mg iron) Tab tablet; Take 1 tablet (325 mg total) by mouth once daily.  Dispense: 30 tablet; Refill: 5  -     CBC Auto Differential; Future; Expected date: 05/31/2025  -     Iron and TIBC; Future; Expected date: 05/31/2025    5. Primary hypertension  Overview:  - carvedilol 12.5 mg BID  - valsartan 160 mg daily  - chlorthalidone 12.5 mg daily  - maintain daily adequate hydration with at least 1.5 to 2 L water     Orders:  -     spironolactone (ALDACTONE) 25 MG tablet; Take 1 tablet (25 mg total) by mouth once daily.  Dispense: 30 tablet; Refill: 11  -     blood pressure monitor Kit; 1 each by Misc.(Non-Drug; Combo Route) route 2 (two) times a day.  Dispense: 1 each; Refill: 0        Other than changes above, continue current medications and maintain follow up with specialists.      No follow-ups on file.   Recent Results (from the past 12 weeks)   Iron and TIBC    Collection Time: 02/06/25  2:11 PM   Result Value Ref Range    Iron 58 45 - 160 ug/dL    Transferrin 233 200 - 375 mg/dL    TIBC 345 250 - 450 ug/dL    Saturated Iron 17 (L) 20 - 50 %   Vitamin B12    Collection Time: 02/06/25  2:11 PM   Result Value Ref Range    Vitamin B-12 306 210 - 950 pg/mL   Ferritin    Collection Time: 02/06/25  2:11 PM   Result Value Ref Range    Ferritin 125 20.0 - 300.0 ng/mL   Microalbumin/Creatinine Ratio, Urine    Collection Time: 02/06/25  2:11 PM   Result Value Ref Range    Microalbumin, Urine 8.0 ug/mL    Creatinine, Urine 137.5 23.0 - 375.0 mg/dL    Microalb/Creat Ratio 5.8 0.0 - 30.0 ug/mg   HIV 1/2 Ag/Ab (4th Gen)     Collection Time: 02/06/25  2:11 PM   Result Value Ref Range    HIV 1/2 Ag/Ab Non-reactive Non-reactive   Lipid Panel    Collection Time: 02/06/25  2:11 PM   Result Value Ref Range    Cholesterol 148 120 - 199 mg/dL    Triglycerides 164 (H) 30 - 150 mg/dL    HDL 37 (L) 40 - 75 mg/dL    LDL Cholesterol 78.2 63.0 - 159.0 mg/dL    HDL/Cholesterol Ratio 25.0 20.0 - 50.0 %    Total Cholesterol/HDL Ratio 4.0 2.0 - 5.0    Non-HDL Cholesterol 111 mg/dL   Hemoglobin A1C    Collection Time: 02/06/25  2:11 PM   Result Value Ref Range    Hemoglobin A1C 6.1 (H) 4.0 - 5.6 %    Estimated Avg Glucose 128 68 - 131 mg/dL   TSH    Collection Time: 02/06/25  2:11 PM   Result Value Ref Range    TSH 1.092 0.400 - 4.000 uIU/mL   CBC Auto Differential    Collection Time: 02/06/25  2:11 PM   Result Value Ref Range    WBC 7.06 3.90 - 12.70 K/uL    RBC 4.55 (L) 4.60 - 6.20 M/uL    Hemoglobin 12.4 (L) 14.0 - 18.0 g/dL    Hematocrit 40.9 40.0 - 54.0 %    MCV 90 82 - 98 fL    MCH 27.3 27.0 - 31.0 pg    MCHC 30.3 (L) 32.0 - 36.0 g/dL    RDW 14.6 (H) 11.5 - 14.5 %    Platelets 245 150 - 450 K/uL    MPV 11.7 9.2 - 12.9 fL    Immature Granulocytes 0.6 (H) 0.0 - 0.5 %    Gran # (ANC) 5.1 1.8 - 7.7 K/uL    Immature Grans (Abs) 0.04 0.00 - 0.04 K/uL    Lymph # 1.0 1.0 - 4.8 K/uL    Mono # 0.7 0.3 - 1.0 K/uL    Eos # 0.2 0.0 - 0.5 K/uL    Baso # 0.03 0.00 - 0.20 K/uL    nRBC 0 0 /100 WBC    Gran % 71.7 38.0 - 73.0 %    Lymph % 14.6 (L) 18.0 - 48.0 %    Mono % 10.2 4.0 - 15.0 %    Eosinophil % 2.5 0.0 - 8.0 %    Basophil % 0.4 0.0 - 1.9 %    Differential Method Automated    Comprehensive Metabolic Panel    Collection Time: 02/06/25  2:11 PM   Result Value Ref Range    Sodium 143 136 - 145 mmol/L    Potassium 3.6 3.5 - 5.1 mmol/L    Chloride 107 95 - 110 mmol/L    CO2 26 23 - 29 mmol/L    Glucose 127 (H) 70 - 110 mg/dL    BUN 6 (L) 8 - 23 mg/dL    Creatinine 0.8 0.5 - 1.4 mg/dL    Calcium 8.8 8.7 - 10.5 mg/dL    Total Protein 7.2 6.0 - 8.4 g/dL     Albumin 3.7 3.5 - 5.2 g/dL    Total Bilirubin 0.7 0.1 - 1.0 mg/dL    Alkaline Phosphatase 99 55 - 135 U/L    AST 21 10 - 40 U/L    ALT 13 10 - 44 U/L    eGFR >60.0 >60 mL/min/1.73 m^2    Anion Gap 10 8 - 16 mmol/L   Vitamin D    Collection Time: 25  2:11 PM   Result Value Ref Range    Vit D, 25-Hydroxy 7 (L) 30 - 96 ng/mL         Renate Dominguez FNP [facility]                       [1]   Current Outpatient Medications:     albuterol (PROVENTIL/VENTOLIN HFA) 90 mcg/actuation inhaler, Inhale 1-2 puffs into the lungs every 6 (six) hours as needed for Wheezing or Shortness of Breath. Rescue, Disp: 18 g, Rfl: 5    amitriptyline (ELAVIL) 100 MG tablet, Take 1 tablet (100 mg total) by mouth every evening., Disp: 30 tablet, Rfl: 11    aspirin (ECOTRIN) 81 MG EC tablet, Take 81 mg by mouth once daily. NOTIFIED DR. GAINES OFFICE, Disp: , Rfl:     blood pressure monitor Kit, 1 each by Misc.(Non-Drug; Combo Route) route 2 (two) times a day., Disp: 1 each, Rfl: 0    carvediloL (COREG) 12.5 MG tablet, Take 12.5 mg by mouth 2 (two) times daily., Disp: , Rfl:     clopidogreL (PLAVIX) 75 mg tablet, Take 1 tablet (75 mg total) by mouth once daily. NOTIFIED DR. GAINES OFFICE, Disp: 30 tablet, Rfl: 11    DEXCOM G7  Misc, Use to check glucose with sensors, Disp: 1 each, Rfl: 0    DEXCOM G7 SENSOR Chery, 1 Device by Misc.(Non-Drug; Combo Route) route every 10 days., Disp: 3 each, Rfl: 11    dorzolamide-timolol 2-0.5% (COSOPT) 22.3-6.8 mg/mL ophthalmic solution, SMARTSI Drop(s) In Eye(s) Every 12 Hours, Disp: , Rfl:     ergocalciferol (VITAMIN D2) 50,000 unit Cap, Take 1 capsule (50,000 Units total) by mouth every Mon, Wed, Fri., Disp: 12 capsule, Rfl: 1    FEROSUL 325 mg (65 mg iron) Tab tablet, Take 1 tablet (325 mg total) by mouth once daily., Disp: 30 tablet, Rfl: 5    gabapentin (NEURONTIN) 400 MG capsule, Take 1 capsule (400 mg total) by mouth 2 (two) times daily., Disp: 60 capsule, Rfl: 11    insulin  glargine U-100, Lantus, (LANTUS SOLOSTAR U-100 INSULIN) 100 unit/mL (3 mL) InPn pen, Inject 20 Units into the skin once daily., Disp: 6 mL, Rfl: 11    insulin glargine U-100, Lantus, 100 unit/mL injection, Inject 20 Units into the skin every evening., Disp: 6 mL, Rfl: 11    JARDIANCE 25 mg tablet, Take 1 tablet by mouth once daily, Disp: 30 tablet, Rfl: 0    lancets Misc, 1 lancet  by Misc.(Non-Drug; Combo Route) route 4 (four) times daily., Disp: 200 each, Rfl: 4    latanoprost 0.005 % ophthalmic solution, , Disp: , Rfl:     omega-3 acid ethyl esters (LOVAZA) 1 gram capsule, Take 2 capsules (2 g total) by mouth 2 (two) times daily., Disp: 360 capsule, Rfl: 3    pantoprazole (PROTONIX) 40 MG tablet, Take 1 tablet (40 mg total) by mouth once daily., Disp: 90 tablet, Rfl: 3    QUEtiapine (SEROQUEL) 100 MG Tab, Take 1 tablet (100 mg total) by mouth every evening., Disp: 30 tablet, Rfl: 5    rosuvastatin (CRESTOR) 40 MG Tab, Take 1 tablet (40 mg total) by mouth every evening., Disp: 90 tablet, Rfl: 3    spironolactone (ALDACTONE) 25 MG tablet, Take 1 tablet (25 mg total) by mouth once daily., Disp: 30 tablet, Rfl: 11    STIOLTO RESPIMAT 2.5-2.5 mcg/actuation Mist, Inhale 2 puffs into the lungs once daily. Controller, Disp: 4 g, Rfl: 11    valsartan (DIOVAN) 160 MG tablet, Take 1 tablet (160 mg total) by mouth 2 (two) times daily., Disp: 60 tablet, Rfl: 11

## 2025-04-01 ENCOUNTER — RESULTS FOLLOW-UP (OUTPATIENT)
Dept: PRIMARY CARE CLINIC | Facility: CLINIC | Age: 60
End: 2025-04-01

## 2025-04-01 LAB — 25(OH)D3+25(OH)D2 SERPL-MCNC: 33 NG/ML (ref 30–96)

## 2025-04-08 ENCOUNTER — LAB VISIT (OUTPATIENT)
Dept: LAB | Facility: HOSPITAL | Age: 60
End: 2025-04-08
Attending: NURSE PRACTITIONER
Payer: MEDICAID

## 2025-04-08 DIAGNOSIS — E55.9 VITAMIN D DEFICIENCY: ICD-10-CM

## 2025-04-08 LAB — 25(OH)D3+25(OH)D2 SERPL-MCNC: 29 NG/ML (ref 30–96)

## 2025-04-08 PROCEDURE — 36415 COLL VENOUS BLD VENIPUNCTURE: CPT

## 2025-04-08 PROCEDURE — 82306 VITAMIN D 25 HYDROXY: CPT

## 2025-04-09 ENCOUNTER — RESULTS FOLLOW-UP (OUTPATIENT)
Dept: PRIMARY CARE CLINIC | Facility: CLINIC | Age: 60
End: 2025-04-09
Payer: MEDICAID

## 2025-04-09 DIAGNOSIS — E55.9 VITAMIN D DEFICIENCY: ICD-10-CM

## 2025-04-09 RX ORDER — ERGOCALCIFEROL 1.25 MG/1
50000 CAPSULE ORAL
Qty: 4 CAPSULE | Refills: 1 | Status: SHIPPED | OUTPATIENT
Start: 2025-04-09 | End: 2025-06-08

## 2025-05-12 ENCOUNTER — PATIENT MESSAGE (OUTPATIENT)
Dept: ADMINISTRATIVE | Facility: HOSPITAL | Age: 60
End: 2025-05-12
Payer: MEDICAID

## 2025-05-14 ENCOUNTER — PATIENT OUTREACH (OUTPATIENT)
Dept: ADMINISTRATIVE | Facility: HOSPITAL | Age: 60
End: 2025-05-14
Payer: MEDICAID

## 2025-05-14 RX ORDER — METFORMIN HYDROCHLORIDE 500 MG/1
500 TABLET, EXTENDED RELEASE ORAL 2 TIMES DAILY
COMMUNITY
Start: 2025-04-03

## 2025-05-14 NOTE — Clinical Note
Please get foot exam from Dr. Lozano Please get dm eye exam from [facility].

## 2025-05-14 NOTE — PROGRESS NOTES
Portal active: yes  Chart reviewed, immunization record updated.  No new results noted on [facility] or [facility] web site.  Care Everywhere updated.   Smoking Cessation Program Eligibility: smoker  [facility] Hypertension Program Eligibility: active  Patient care coordination note  CMAT to get eye exam from [facility]  And foot exam from Dr. Lozano

## 2025-05-14 NOTE — LETTER
5/15/25    AUTHORIZATION FOR RELEASE OF   CONFIDENTIAL INFORMATION    DR BAZZI,    We are seeing Jayden Canada Jr., date of birth 1965, in the clinic at [facility]. Renate Dominguez NP is the patient's PCP. Jayden Canada Jr. has an outstanding lab/procedure at the time we reviewed his chart. In order to help keep his health information updated, he has authorized us to request the following medical record(s):        DIABETIC FOOT EXAM     Please fax records to [facility], Blereau, Marcia, NP,  at [phone] or email to [email or web address].             [facility]  REGISTRATION AUTHORIZATION       Jayden Canada Jr.  MRN: [id number]  : 1965  Age: 59 y.o.  Sex: male       A. Consent for Examination and Treatment: I hereby authorize the providers and employees of [facility] to provide medical treatment/services which includes, but is not limited to, performing and administering tests and diagnostic procedures that are deemed necessary, including, but not limited to, imaging examinations, blood tests and other laboratory procedures as may be required by the hospital, clinic, or may be ordered by my physician(s) or persons working under the general and/or special instructions of my physician(s).  I understand and agree that this consent covers all authorized persons, including but not limited to residents, nurse practitioners, physicians' assistants, specialists, consultants and independently contracted physicians who are called upon by the physician in charge to carry out the diagnostic procedures and medical or surgical treatment.  I hereby authorize [facility] to retain or dispose of any specimens or tissue, should there be such remaining from any test or procedure.  I hereby authorize and give consent for [facility] providers and employees to take photographs, images or  videotapes of such diagnostic, surgical or treatment procedures of Patient as may be required by [facility] or as may be ordered by a physician. I further acknowledge and agree that [facility] may use cameras or other devices for patient monitoring.  I am aware that the practice of medicine is not an exact science, and I acknowledge that no guarantees have been made to me as to the outcome of any tests, procedures or treatment.  As part of your health care delivery, you will be offered a Covid-19 vaccine. Certain eligibility criteria may be supported under Emergency Use Authorization (EUA). Please let your medical team know if you wish to receive the Covid-19 vaccine during this hospitalization.       B. Authorization for Release of Information: I understand that my insurance company and/or their agents may need information necessary to make determinations about payment/reimbursement. I hereby provide authorization to release to all insurance companies, their successors, assignees, other parties with whom they may have contracted, or others acting on their behalf, that are involved with payment for any hospital and/or clinic charges incurred by the patient, any information that they request and deem necessary for payment/reimbursement, and/or quality review. I further authorize the release of my health information to physicians or other health care practitioners on staff who are involved in my health care now and in the future, and to other health care providers, entities, or institutions for the purpose of my continued care and treatment, including referrals.     C. Medicare Patient's Certification and Authorization to Release Information and Payment Request: I              certify that the information given by me in applying for payment under Title XVIII of the Social Security Act       is correct. I authorize any kumar of medical or other information about me to  release to the Social Security      Administration or its intermediaries or carriers, any information needed for this or a related Medicare claim. I       request that payment of authorized benefits be made on my behalf.            REGISTRATION AUTHORIZATION  Form No. [id number] (Rev. 12/14/2016)  Page 1 of 3              [facility]     D. Assignment of Insurance Benefits: I hereby authorize all insurance companies, health plans, defined benefit plans, health insurers or any entity that is or may be responsible for payment of my medical expenses to pay all hospital and medical benefits now due, and to become due and payable to me under any hospital benefits, sick benefits, injury benefits or any other benefit for services rendered to me, including Major Medical Benefits, direct to [facility] and all independently contracted physicians. I assign any and all rights that I may have against any and all insurance companies, health plans, defined benefit plans, health insurers or any entity that is or may be responsible for payment of my medical expenses, including, but not limited to any right to appeal a denial of a claim, any right to bring any action, lawsuit, administrative proceeding, or other cause of action on my behalf. I specifically assign my right to pursue litigation against any and all insurance companies, health plans, defined benefit plans, health insurers or any entity that is or may be responsible for payment of my medical expenses based upon a refusal to pay charges.     E. Valuables: It is understood and agreed that [facility] is not liable for the damage to or loss of any money, jewelry, documents, dentures, eye glasses, hearing aids, prosthetics, or other property of value     F. Computer Equipment: I understand and agree that should I choose to use computer equipment owned by [facility] or if I choose to access  the Internet via 's network, I do so at my own risk.  is not responsible for any damage to my computer equipment or to any damages of any type that might arise from my loss of equipment or data.     G. Acceptance of Financial Responsibility: I agree that in consideration of the services and supplies that have been or will be furnished to the patient, I am hereby obligated to pay all charges made for or on the account of the patient according to the standard rates (in effect at the time the services and supplies are delivered) established by  , including its Patient Financial Assistance Policy to the extent it is applicable. I understand that I am responsible for all charges, or portions thereof, not covered by insurance or other sources. Patient refunds will be distributed only after balances at all  facilities are paid.     H. Communication Authorization: I hereby authorize  and its representatives, along with any billing service or  who may work on their behalf, to contact me on my cell phone and/or home phone using prerecorded messages, artificial voice messages, automatic telephone dialing devices or other computer assisted technology, or by electronic mail, text messaging, or by any other form of electronic communication. This includes, but is not limited to, appointment reminders, yearly physical exam reminders, preventive care reminders, patient campaigns, welcome calls, and calls about account balances on my account or any account on which I am listed as a guarantor. I understand I have the right to opt out of these communications at any time.     I. Relationship Between Facility and Physician: I understand that some, but not all, providers       furnishing services to the patient are not employees or agents of [facility]  [facility]. The patient is under the care and supervision of his/her attending physician, and it is the responsibility of the facility and its nursing staff to carry out the instructions of such physicians. It is the responsibility of the patient's physician/designee to obtain the patient's informed consent, when required, for medical or surgical treatment, special diagnostic or therapeutic procedures, or hospital services rendered for the patient under the special instructions of the physician/designee.   REGISTRATION AUTHORIZATION  Form No. [id number] (Rev. 12/14/2016) Page 2 of 3    [facility]     J. Notice of Privacy Practices: I acknowledge I have received a copy of [facility] 's Notice of Privacy Practices.     K. Facility Directory: I have discussed with the organization my desire to be either included or excluded in the facility directory. I understand that if my choice is to opt-out of being identified in the facility directory that the facility will not provide any information about me such as my condition (e.g. fair, stable, etc.) or my location in the facility (eg room number, department).     L. LINKS: [facility] is a LINKS ([place] [employer]) participating facility. LINKS is a [facility]-sponsored confidential computer system that helps you and your doctor keep track of your and your child's immunization history. I acknowledge that I am allowing [facility] to share this information with LINKS.     M. TERM: This authorization is valid for this and subsequent care/treatment I receive at [facility] and will remain valid unless/until revoked in writing by me.      _______________________________   Patient/Legal Gaurdian Signature     This signature was collected at 05/20/2024            _______________________________   Printed Name/Relationship to Patient    Witness    _______________________________   Witness Signature     This signature was collected at 2024      _______________________________   Printed Name          Patient Name: Jayden Canada Jr.  : 1965  Patient Phone #: [phone]

## 2025-05-14 NOTE — LETTER
5/15/25    AUTHORIZATION FOR RELEASE OF   CONFIDENTIAL INFORMATION    DR BLELO,    We are seeing Jayden Canada Jr., date of birth 1965, in the clinic at [facility]. Renate Dominguez NP is the patient's PCP. Jayden Canada Jr. has an outstanding lab/procedure at the time we reviewed his chart. In order to help keep his health information updated, he has authorized us to request the following medical record(s):      MOST DIABETIC EYE EXAM       Please fax records to [facility], Blereau, Marcia, NP,  at [phone] or email to [email or web address].             [facility]  REGISTRATION AUTHORIZATION       Jayden Canada Jr.  MRN: [id number]  : 1965  Age: 59 y.o.  Sex: male       A. Consent for Examination and Treatment: I hereby authorize the providers and employees of [facility] to provide medical treatment/services which includes, but is not limited to, performing and administering tests and diagnostic procedures that are deemed necessary, including, but not limited to, imaging examinations, blood tests and other laboratory procedures as may be required by the hospital, clinic, or may be ordered by my physician(s) or persons working under the general and/or special instructions of my physician(s).  I understand and agree that this consent covers all authorized persons, including but not limited to residents, nurse practitioners, physicians' assistants, specialists, consultants and independently contracted physicians who are called upon by the physician in charge to carry out the diagnostic procedures and medical or surgical treatment.  I hereby authorize [facility] to retain or dispose of any specimens or tissue, should there be such remaining from any test or procedure.  I hereby authorize and give consent for [facility] providers and employees to take photographs, images or  videotapes of such diagnostic, surgical or treatment procedures of Patient as may be required by [place] or as may be ordered by a physician. I further acknowledge and agree that [place] may use cameras or other devices for patient monitoring.  I am aware that the practice of medicine is not an exact science, and I acknowledge that no guarantees have been made to me as to the outcome of any tests, procedures or treatment.  As part of your health care delivery, you will be offered a Covid-19 vaccine. Certain eligibility criteria may be supported under Emergency Use Authorization (EUA). Please let your medical team know if you wish to receive the Covid-19 vaccine during this hospitalization.       B. Authorization for Release of Information: I understand that my insurance company and/or their agents may need information necessary to make determinations about payment/reimbursement. I hereby provide authorization to release to all insurance companies, their successors, assignees, other parties with whom they may have contracted, or others acting on their behalf, that are involved with payment for any hospital and/or clinic charges incurred by the patient, any information that they request and deem necessary for payment/reimbursement, and/or quality review. I further authorize the release of my health information to physicians or other health care practitioners on staff who are involved in my health care now and in the future, and to other health care providers, entities, or institutions for the purpose of my continued care and treatment, including referrals.     C. Medicare Patient's Certification and Authorization to Release Information and Payment Request: I              certify that the information given by me in applying for payment under Title XVIII of the Social Security Act       is correct. I authorize any kumar of medical or other information about me to  release to the Social Security      Administration or its intermediaries or carriers, any information needed for this or a related Medicare claim. I       request that payment of authorized benefits be made on my behalf.            REGISTRATION AUTHORIZATION  Form No. [id number] (Rev. 12/14/2016)  Page 1 of 3              [facility]     D. Assignment of Insurance Benefits: I hereby authorize all insurance companies, health plans, defined benefit plans, health insurers or any entity that is or may be responsible for payment of my medical expenses to pay all hospital and medical benefits now due, and to become due and payable to me under any hospital benefits, sick benefits, injury benefits or any other benefit for services rendered to me, including Major Medical Benefits, direct to [facility] and all independently contracted physicians. I assign any and all rights that I may have against any and all insurance companies, health plans, defined benefit plans, health insurers or any entity that is or may be responsible for payment of my medical expenses, including, but not limited to any right to appeal a denial of a claim, any right to bring any action, lawsuit, administrative proceeding, or other cause of action on my behalf. I specifically assign my right to pursue litigation against any and all insurance companies, health plans, defined benefit plans, health insurers or any entity that is or may be responsible for payment of my medical expenses based upon a refusal to pay charges.     E. Valuables: It is understood and agreed that [facility] is not liable for the damage to or loss of any money, jewelry, documents, dentures, eye glasses, hearing aids, prosthetics, or other property of value     F. Computer Equipment: I understand and agree that should I choose to use computer equipment owned by [facility] or if I choose to access  the Internet via [facility]'s network, I do so at my own risk. [facility] is not responsible for any damage to my computer equipment or to any damages of any type that might arise from my loss of equipment or data.     G. Acceptance of Financial Responsibility: I agree that in consideration of the services and supplies that have been or will be furnished to the patient, I am hereby obligated to pay all charges made for or on the account of the patient according to the standard rates (in effect at the time the services and supplies are delivered) established by [facility] , including its Patient Financial Assistance Policy to the extent it is applicable. I understand that I am responsible for all charges, or portions thereof, not covered by insurance or other sources. Patient refunds will be distributed only after balances at all [facility] facilities are paid.     H. Communication Authorization: I hereby authorize [facility] and its representatives, along with any billing service or  who may work on their behalf, to contact me on my cell phone and/or home phone using prerecorded messages, artificial voice messages, automatic telephone dialing devices or other computer assisted technology, or by electronic mail, text messaging, or by any other form of electronic communication. This includes, but is not limited to, appointment reminders, yearly physical exam reminders, preventive care reminders, patient campaigns, welcome calls, and calls about account balances on my account or any account on which I am listed as a guarantor. I understand I have the right to opt out of these communications at any time.     I. Relationship Between Facility and Physician: I understand that some, but not all, providers       furnishing services to the patient are not employees or agents of [facility]  [facility]. The patient is under the care and supervision of his/her attending physician, and it is the responsibility of the facility and its nursing staff to carry out the instructions of such physicians. It is the responsibility of the patient's physician/designee to obtain the patient's informed consent, when required, for medical or surgical treatment, special diagnostic or therapeutic procedures, or hospital services rendered for the patient under the special instructions of the physician/designee.   REGISTRATION AUTHORIZATION  Form No. [id number] (Rev. 12/14/2016) Page 2 of 3    [facility]     J. Notice of Privacy Practices: I acknowledge I have received a copy of [facility] 's Notice of Privacy Practices.     K. Facility Directory: I have discussed with the organization my desire to be either included or excluded in the facility directory. I understand that if my choice is to opt-out of being identified in the facility directory that the facility will not provide any information about me such as my condition (e.g. fair, stable, etc.) or my location in the facility (eg room number, department).     L. LINKS: [facility] is a LINKS ([place] [employer]) participating facility. LINKS is a [facility]-sponsored confidential computer system that helps you and your doctor keep track of your and your child's immunization history. I acknowledge that I am allowing [facility] to share this information with LINKS.     M. TERM: This authorization is valid for this and subsequent care/treatment I receive at [facility] and will remain valid unless/until revoked in writing by me.      _______________________________   Patient/Legal Gaurdian Signature     This signature was collected at 05/20/2024            _______________________________   Printed Name/Relationship to Patient    Witness    _______________________________   Witness Signature     This signature was collected at 2024      _______________________________   Printed Name            Patient Name: Jayden Canada Jr.  : 1965  Patient Phone #: [phone]

## 2025-06-02 DIAGNOSIS — J44.9 CHRONIC OBSTRUCTIVE PULMONARY DISEASE, UNSPECIFIED COPD TYPE: ICD-10-CM

## 2025-06-02 DIAGNOSIS — E78.2 MIXED HYPERLIPIDEMIA: ICD-10-CM

## 2025-06-02 RX ORDER — TIOTROPIUM BROMIDE AND OLODATEROL 3.124; 2.736 UG/1; UG/1
SPRAY, METERED RESPIRATORY (INHALATION)
COMMUNITY
Start: 2025-05-04 | End: 2025-06-02 | Stop reason: SDUPTHER

## 2025-06-02 RX ORDER — TIOTROPIUM BROMIDE AND OLODATEROL 3.124; 2.736 UG/1; UG/1
2 SPRAY, METERED RESPIRATORY (INHALATION) DAILY
Qty: 4 G | Refills: 11 | Status: SHIPPED | OUTPATIENT
Start: 2025-06-02

## 2025-06-02 RX ORDER — ALBUTEROL SULFATE 90 UG/1
1-2 INHALANT RESPIRATORY (INHALATION) EVERY 6 HOURS PRN
Qty: 18 G | Refills: 5 | Status: SHIPPED | OUTPATIENT
Start: 2025-06-02 | End: 2026-06-02

## 2025-06-02 RX ORDER — ROSUVASTATIN CALCIUM 40 MG/1
40 TABLET, COATED ORAL NIGHTLY
Qty: 90 TABLET | Refills: 3 | Status: SHIPPED | OUTPATIENT
Start: 2025-06-02 | End: 2026-06-02

## 2025-06-09 ENCOUNTER — PATIENT MESSAGE (OUTPATIENT)
Dept: ADMINISTRATIVE | Facility: HOSPITAL | Age: 60
End: 2025-06-09
Payer: MEDICAID

## 2025-08-06 ENCOUNTER — OFFICE VISIT (OUTPATIENT)
Dept: PRIMARY CARE CLINIC | Facility: CLINIC | Age: 60
End: 2025-08-06
Payer: MEDICAID

## 2025-08-06 VITALS
BODY MASS INDEX: 31.64 KG/M2 | SYSTOLIC BLOOD PRESSURE: 139 MMHG | WEIGHT: 221 LBS | TEMPERATURE: 98 F | DIASTOLIC BLOOD PRESSURE: 76 MMHG | OXYGEN SATURATION: 97 % | HEIGHT: 70 IN | HEART RATE: 68 BPM

## 2025-08-06 DIAGNOSIS — Z79.4 TYPE 2 DIABETES MELLITUS WITH DIABETIC POLYNEUROPATHY, WITH LONG-TERM CURRENT USE OF INSULIN: Primary | ICD-10-CM

## 2025-08-06 DIAGNOSIS — Z12.2 ENCOUNTER FOR SCREENING FOR LUNG CANCER: ICD-10-CM

## 2025-08-06 DIAGNOSIS — E11.42 TYPE 2 DIABETES MELLITUS WITH DIABETIC POLYNEUROPATHY, WITH LONG-TERM CURRENT USE OF INSULIN: Primary | ICD-10-CM

## 2025-08-06 DIAGNOSIS — G89.29 CHRONIC BILATERAL LOW BACK PAIN WITH BILATERAL SCIATICA: ICD-10-CM

## 2025-08-06 DIAGNOSIS — M54.41 CHRONIC BILATERAL LOW BACK PAIN WITH BILATERAL SCIATICA: ICD-10-CM

## 2025-08-06 DIAGNOSIS — M54.42 CHRONIC BILATERAL LOW BACK PAIN WITH BILATERAL SCIATICA: ICD-10-CM

## 2025-08-06 DIAGNOSIS — Z12.5 PROSTATE CANCER SCREENING: ICD-10-CM

## 2025-08-06 DIAGNOSIS — Z12.11 COLON CANCER SCREENING: ICD-10-CM

## 2025-08-06 LAB
EAG (OHS): 131 MG/DL (ref 68–131)
HBA1C MFR BLD: 6.2 % (ref 4–5.6)
HOLD SPECIMEN: NORMAL
PSA SERPL-MCNC: 0.45 NG/ML

## 2025-08-06 PROCEDURE — 3066F NEPHROPATHY DOC TX: CPT | Mod: CPTII,,, | Performed by: NURSE PRACTITIONER

## 2025-08-06 PROCEDURE — 3075F SYST BP GE 130 - 139MM HG: CPT | Mod: CPTII,,, | Performed by: NURSE PRACTITIONER

## 2025-08-06 PROCEDURE — 99213 OFFICE O/P EST LOW 20 MIN: CPT | Mod: S$PBB,,, | Performed by: NURSE PRACTITIONER

## 2025-08-06 PROCEDURE — 3061F NEG MICROALBUMINURIA REV: CPT | Mod: CPTII,,, | Performed by: NURSE PRACTITIONER

## 2025-08-06 PROCEDURE — 3008F BODY MASS INDEX DOCD: CPT | Mod: CPTII,,, | Performed by: NURSE PRACTITIONER

## 2025-08-06 PROCEDURE — 83036 HEMOGLOBIN GLYCOSYLATED A1C: CPT | Performed by: NURSE PRACTITIONER

## 2025-08-06 PROCEDURE — 3078F DIAST BP <80 MM HG: CPT | Mod: CPTII,,, | Performed by: NURSE PRACTITIONER

## 2025-08-06 PROCEDURE — 84153 ASSAY OF PSA TOTAL: CPT | Performed by: NURSE PRACTITIONER

## 2025-08-06 PROCEDURE — 36415 COLL VENOUS BLD VENIPUNCTURE: CPT | Mod: PBBFAC

## 2025-08-06 PROCEDURE — 4010F ACE/ARB THERAPY RXD/TAKEN: CPT | Mod: CPTII,,, | Performed by: NURSE PRACTITIONER

## 2025-08-06 PROCEDURE — 3044F HG A1C LEVEL LT 7.0%: CPT | Mod: CPTII,,, | Performed by: NURSE PRACTITIONER

## 2025-08-06 PROCEDURE — 99999PBSHW PR PBB SHADOW TECHNICAL ONLY FILED TO HB: Mod: PBBFAC,,,

## 2025-08-06 PROCEDURE — 99213 OFFICE O/P EST LOW 20 MIN: CPT | Mod: PBBFAC | Performed by: NURSE PRACTITIONER

## 2025-08-06 PROCEDURE — 99999 PR PBB SHADOW E&M-EST. PATIENT-LVL III: CPT | Mod: PBBFAC,,, | Performed by: NURSE PRACTITIONER

## 2025-08-06 RX ORDER — INSULIN PUMP SYRINGE, 3 ML
EACH MISCELLANEOUS
Qty: 1 EACH | Refills: 0 | Status: SHIPPED | OUTPATIENT
Start: 2025-08-06 | End: 2026-08-06

## 2025-08-06 NOTE — PROGRESS NOTES
[facility] Note    HPI:  Jayden Canada Jr. is a 60 y.o. male who presents today for Health Maintenance (Health maintenance,  patient states he does not have a glucose meter)     Also has seen Dr. Garcia in past for arm pain, now having low back pain radiating down both legs, will send referral to Dr. Garcia for the new pain, has not been doing any diabetes shots since his meter has been broken    Review of Systems   Constitutional: Negative.    HENT: Negative.     Eyes: Negative.    Respiratory: Negative.     Cardiovascular: Negative.    Gastrointestinal: Negative.    Genitourinary: Negative.    Musculoskeletal:  Positive for back pain.   Skin: Negative.    Neurological: Negative.    Endo/Heme/Allergies: Negative.    Psychiatric/Behavioral: Negative.        A review of systems was performed and was negative except as noted above.    I personally reviewed allergies, past medical, surgical, social and family history and updated as appropriate.    Medications:  Current Medications[1]     Health Maintenance:  Immunization History   Administered Date(s) Administered    COVID-19 Vaccine 03/01/2021, 04/06/2021    COVID-19, MRNA, LN-S, PF (MODERNA FULL 0.5 ML DOSE) 03/01/2021, 04/06/2021    DTP 02/23/1966, 03/30/1966, 05/04/1966, 11/27/1968, 10/28/1969, 12/05/2010    Hepatitis B, Pediatric/Adolescent 02/10/2012    IPV 05/04/1966, 04/12/1967, 05/17/1967, 10/28/1969    Influenza 10/12/2016, 08/26/2017, 01/19/2023, 09/13/2024    Influenza (FLUBLOK) - Quadrivalent - Recombinant - PF *Preferred* (egg allergy) 10/18/2023    Influenza - Quadrivalent - PF *Preferred* (6 months and older) 10/12/2016, 08/26/2017, 10/04/2018, 10/03/2019, 10/13/2021, 01/06/2022, 01/19/2023    Influenza - Trivalent - Afluria, Fluzone MDV 11/05/2020, 01/06/2022, 01/19/2023    Influenza - Trivalent - Fluarix, Flulaval, Fluzone, Afluria - PF 10/12/2016, 08/26/2017, 01/06/2022, 09/20/2024    MMR 02/23/1966, 11/29/1967, 02/18/1971,  "08/15/1973    Pneumococcal Conjugate - 13 Valent 11/25/2019    Pneumococcal Conjugate - 20 Valent 08/16/2022    Pneumococcal Polysaccharide - 23 Valent 10/26/2018    Tdap 09/20/2024      Health Maintenance   Topic Date Due    RSV Vaccine (Age 60+ and Pregnant patients) (1 - Risk 60-74 years 1-dose series) Never done    PROSTATE-SPECIFIC ANTIGEN  05/01/2025    Diabetic Eye Exam  05/02/2025    LDCT Lung Screen  08/02/2025    Hemoglobin A1c  08/06/2025    Colorectal Cancer Screening  09/12/2025    Shingles Vaccine (1 of 2) 02/06/2026 (Originally 1/21/2014)    COVID-19 Vaccine (5 - 2024-25 season) 02/06/2026 (Originally 9/1/2024)    Influenza Vaccine (1) 09/01/2025    Diabetes Urine Screening  02/06/2026    Lipid Panel  02/06/2026    Foot Exam  04/29/2026    Low Dose Statin  06/02/2026    TETANUS VACCINE  09/20/2034    Hepatitis C Screening  Completed    HIV Screening  Completed    Pneumococcal Vaccines (Age 50+)  Completed     Health Maintenance Topics with due status: Not Due       Topic Last Completion Date    TETANUS VACCINE 09/20/2024    Influenza Vaccine 09/20/2024    Diabetes Urine Screening 02/06/2025    Lipid Panel 02/06/2025    Foot Exam 04/29/2025    Low Dose Statin 06/02/2025     Health Maintenance Due   Topic Date Due    RSV Vaccine (Age 60+ and Pregnant patients) (1 - Risk 60-74 years 1-dose series) Never done    PROSTATE-SPECIFIC ANTIGEN  05/01/2025    Diabetic Eye Exam  05/02/2025    LDCT Lung Screen  08/02/2025    Hemoglobin A1c  08/06/2025    Colorectal Cancer Screening  09/12/2025       PHYSICAL EXAM:  Vitals:    08/06/25 1344   BP: 139/76   Patient Position: Sitting   Pulse: 68   Temp: 97.9 °F (36.6 °C)   SpO2: 97%   Weight: 100.2 kg (221 lb)   Height: 5' 10" (1.778 m)     Body mass index is 31.71 kg/m².  Physical Exam  Constitutional:       Appearance: Normal appearance. He is obese.   HENT:      Head: Normocephalic.      Nose: Nose normal.      Mouth/Throat:      Mouth: Mucous membranes are moist. "   Cardiovascular:      Rate and Rhythm: Normal rate and regular rhythm.      Heart sounds: Normal heart sounds.   Pulmonary:      Effort: Pulmonary effort is normal.      Breath sounds: Normal breath sounds.   Musculoskeletal:         General: Tenderness (lower back radiating down legs) present. Normal range of motion.      Cervical back: Normal range of motion.   Skin:     General: Skin is warm and dry.   Neurological:      General: No focal deficit present.      Mental Status: He is alert and oriented to person, place, and time.          ASSESSMENT/PLAN:  1. Type 2 diabetes mellitus with diabetic polyneuropathy, with long-term current use of insulin  -     Cancel: Hemoglobin A1C; Future; Expected date: 08/06/2025  -     Cancel: Hemoglobin A1C; Future; Expected date: 08/06/2025  -     Hemoglobin A1C; Future; Expected date: 08/06/2025    2. Prostate cancer screening  -     Cancel: PSA, Screening; Future; Expected date: 08/06/2025  -     Cancel: PSA, Screening; Future; Expected date: 08/06/2025  -     PSA, Screening; Future; Expected date: 08/06/2025    3. Encounter for screening for lung cancer  -     CT Chest Lung Screening Low Dose; Future; Expected date: 08/06/2025    4. Chronic bilateral low back pain with bilateral sciatica  -     Ambulatory referral/consult to Neurology; Future; Expected date: 08/06/2025    5. Colon cancer screening  -     Cologuard Screening (Multitarget Stool DNA); Future; Expected date: 09/12/2025    Other orders  -     blood-glucose meter kit; Use as instructed  Dispense: 1 each; Refill: 0        Other than changes above, continue current medications and maintain follow up with specialists.      No follow-ups on file.   No results found for this or any previous visit (from the past 12 weeks).      RAYMON Cameron  [facility]                       [1]   Current Outpatient Medications:     albuterol (PROVENTIL/VENTOLIN HFA) 90 mcg/actuation inhaler, Inhale 1-2 puffs into the  lungs every 6 (six) hours as needed for Wheezing or Shortness of Breath. Rescue, Disp: 18 g, Rfl: 5    amitriptyline (ELAVIL) 100 MG tablet, Take 1 tablet (100 mg total) by mouth every evening., Disp: 30 tablet, Rfl: 11    aspirin (ECOTRIN) 81 MG EC tablet, Take 81 mg by mouth once daily. NOTIFIED DR. GAINES OFFICE, Disp: , Rfl:     blood pressure monitor Kit, 1 each by Misc.(Non-Drug; Combo Route) route 2 (two) times a day., Disp: 1 each, Rfl: 0    blood-glucose meter kit, Use as instructed, Disp: 1 each, Rfl: 0    carvediloL (COREG) 12.5 MG tablet, Take 12.5 mg by mouth 2 (two) times daily., Disp: , Rfl:     clopidogreL (PLAVIX) 75 mg tablet, Take 1 tablet (75 mg total) by mouth once daily. NOTIFIED DR. GAINES OFFICE, Disp: 30 tablet, Rfl: 11    DEXCOM G7  Misc, Use to check glucose with sensors, Disp: 1 each, Rfl: 0    DEXCOM G7 SENSOR Chery, 1 Device by Misc.(Non-Drug; Combo Route) route every 10 days., Disp: 3 each, Rfl: 11    dorzolamide-timolol 2-0.5% (COSOPT) 22.3-6.8 mg/mL ophthalmic solution, SMARTSI Drop(s) In Eye(s) Every 12 Hours, Disp: , Rfl:     FEROSUL 325 mg (65 mg iron) Tab tablet, Take 1 tablet (325 mg total) by mouth once daily., Disp: 30 tablet, Rfl: 5    gabapentin (NEURONTIN) 400 MG capsule, Take 1 capsule (400 mg total) by mouth 2 (two) times daily., Disp: 60 capsule, Rfl: 11    insulin glargine U-100, Lantus, (LANTUS SOLOSTAR U-100 INSULIN) 100 unit/mL (3 mL) InPn pen, Inject 20 Units into the skin once daily., Disp: 6 mL, Rfl: 11    insulin glargine U-100, Lantus, 100 unit/mL injection, Inject 20 Units into the skin every evening., Disp: 6 mL, Rfl: 11    JARDIANCE 25 mg tablet, Take 1 tablet by mouth once daily, Disp: 30 tablet, Rfl: 0    latanoprost 0.005 % ophthalmic solution, , Disp: , Rfl:     metFORMIN (GLUCOPHAGE-XR) 500 MG ER 24hr tablet, Take 500 mg by mouth 2 (two) times daily., Disp: , Rfl:     omega-3 acid ethyl esters (LOVAZA) 1 gram capsule, Take 2 capsules (2 g  total) by mouth 2 (two) times daily., Disp: 360 capsule, Rfl: 3    pantoprazole (PROTONIX) 40 MG tablet, Take 1 tablet (40 mg total) by mouth once daily., Disp: 90 tablet, Rfl: 3    QUEtiapine (SEROQUEL) 100 MG Tab, Take 1 tablet (100 mg total) by mouth every evening., Disp: 30 tablet, Rfl: 5    rosuvastatin (CRESTOR) 40 MG Tab, Take 1 tablet (40 mg total) by mouth every evening., Disp: 90 tablet, Rfl: 3    spironolactone (ALDACTONE) 25 MG tablet, Take 1 tablet (25 mg total) by mouth once daily., Disp: 30 tablet, Rfl: 11    STIOLTO RESPIMAT 2.5-2.5 mcg/actuation Mist, Inhale 2 puffs into the lungs once daily. Controller, Disp: 4 g, Rfl: 11    valsartan (DIOVAN) 160 MG tablet, Take 1 tablet (160 mg total) by mouth 2 (two) times daily., Disp: 60 tablet, Rfl: 11

## 2025-08-07 ENCOUNTER — PATIENT OUTREACH (OUTPATIENT)
Dept: ADMINISTRATIVE | Facility: HOSPITAL | Age: 60
End: 2025-08-07
Payer: MEDICAID

## 2025-08-07 NOTE — PROGRESS NOTES
Message sent to CMAT: PCP reports: Last eye exam [facility]  Eye exam visit, [facility]---11/20/25  [facility] added to patient care team.

## 2025-08-07 NOTE — LETTER
AUTHORIZATION FOR RELEASE OF   CONFIDENTIAL INFORMATION      We are seeing Jayden Canada Jr., date of birth 1965, in the clinic at [facility]. Renate Dominguez NP is the patient's PCP. Jayden Canada Jr. has an outstanding lab/procedure at the time we reviewed his chart. In order to help keep his health information updated, he has authorized us to request the following medical record(s):                                   (X) EYE EXAM       Please fax records to [facility], [place], Marcia, NP,                                          FAX  [phone]      Patient Name: Jayden Canada Jr.  : 1965  Patient Phone #: [phone]

## 2025-08-15 ENCOUNTER — TELEPHONE (OUTPATIENT)
Dept: PRIMARY CARE CLINIC | Facility: CLINIC | Age: 60
End: 2025-08-15
Payer: MEDICAID

## 2025-08-15 ENCOUNTER — TELEPHONE (OUTPATIENT)
Dept: ENDOCRINOLOGY | Facility: CLINIC | Age: 60
End: 2025-08-15
Payer: MEDICAID

## 2025-08-15 DIAGNOSIS — Z79.4 TYPE 2 DIABETES MELLITUS WITH DIABETIC POLYNEUROPATHY, WITH LONG-TERM CURRENT USE OF INSULIN: Primary | ICD-10-CM

## 2025-08-15 DIAGNOSIS — E11.42 TYPE 2 DIABETES MELLITUS WITH DIABETIC POLYNEUROPATHY, WITH LONG-TERM CURRENT USE OF INSULIN: Primary | ICD-10-CM

## 2025-08-15 RX ORDER — INSULIN PUMP SYRINGE, 3 ML
1 EACH MISCELLANEOUS DAILY
Qty: 1 EACH | Refills: 0 | Status: SHIPPED | OUTPATIENT
Start: 2025-08-15

## 2025-08-15 RX ORDER — LANCETS
1 EACH MISCELLANEOUS DAILY
Qty: 100 EACH | Refills: 3 | Status: SHIPPED | OUTPATIENT
Start: 2025-08-15 | End: 2026-08-15